# Patient Record
Sex: MALE | Race: WHITE | Employment: OTHER | URBAN - METROPOLITAN AREA
[De-identification: names, ages, dates, MRNs, and addresses within clinical notes are randomized per-mention and may not be internally consistent; named-entity substitution may affect disease eponyms.]

---

## 2017-03-28 ENCOUNTER — OFFICE VISIT (OUTPATIENT)
Dept: INTERNAL MEDICINE | Age: 67
End: 2017-03-28

## 2017-03-28 VITALS
DIASTOLIC BLOOD PRESSURE: 80 MMHG | SYSTOLIC BLOOD PRESSURE: 140 MMHG | HEART RATE: 66 BPM | WEIGHT: 218 LBS | BODY MASS INDEX: 30.52 KG/M2 | HEIGHT: 71 IN

## 2017-03-28 DIAGNOSIS — M19.90 ARTHRITIS: ICD-10-CM

## 2017-03-28 DIAGNOSIS — L50.1 IDIOPATHIC URTICARIA: ICD-10-CM

## 2017-03-28 DIAGNOSIS — Z00.00 MEDICARE ANNUAL WELLNESS VISIT, SUBSEQUENT: Primary | ICD-10-CM

## 2017-03-28 DIAGNOSIS — E78.49 OTHER HYPERLIPIDEMIA: ICD-10-CM

## 2017-03-28 DIAGNOSIS — R79.89 LOW TESTOSTERONE: ICD-10-CM

## 2017-03-28 DIAGNOSIS — R73.9 HYPERGLYCEMIA: ICD-10-CM

## 2017-03-28 DIAGNOSIS — I15.9 SECONDARY HYPERTENSION, UNSPECIFIED: ICD-10-CM

## 2017-03-28 PROCEDURE — G0439 PPPS, SUBSEQ VISIT: HCPCS | Performed by: INTERNAL MEDICINE

## 2017-03-28 RX ORDER — VERAPAMIL HYDROCHLORIDE 240 MG/1
240 TABLET, FILM COATED, EXTENDED RELEASE ORAL DAILY
Qty: 90 TABLET | Refills: 3 | Status: ON HOLD | OUTPATIENT
Start: 2017-03-28 | End: 2018-06-12 | Stop reason: CLARIF

## 2017-03-28 RX ORDER — MONTELUKAST SODIUM 10 MG/1
10 TABLET ORAL NIGHTLY
Qty: 90 TABLET | Refills: 3 | Status: SHIPPED | OUTPATIENT
Start: 2017-03-28 | End: 2018-01-23 | Stop reason: SDUPTHER

## 2017-03-28 ASSESSMENT — ENCOUNTER SYMPTOMS
CHEST TIGHTNESS: 0
ABDOMINAL PAIN: 0
VOMITING: 0
SHORTNESS OF BREATH: 0
WHEEZING: 0
NAUSEA: 0

## 2017-04-19 DIAGNOSIS — Z00.00 MEDICARE ANNUAL WELLNESS VISIT, SUBSEQUENT: ICD-10-CM

## 2017-04-19 DIAGNOSIS — I15.9 SECONDARY HYPERTENSION, UNSPECIFIED: ICD-10-CM

## 2017-04-19 LAB
A/G RATIO: 1.8 (ref 1.1–2.2)
ALBUMIN SERPL-MCNC: 4.8 G/DL (ref 3.4–5)
ALP BLD-CCNC: 92 U/L (ref 40–129)
ALT SERPL-CCNC: 30 U/L (ref 10–40)
ANION GAP SERPL CALCULATED.3IONS-SCNC: 16 MMOL/L (ref 3–16)
AST SERPL-CCNC: 18 U/L (ref 15–37)
BASOPHILS ABSOLUTE: 0.1 K/UL (ref 0–0.2)
BASOPHILS RELATIVE PERCENT: 1 %
BILIRUB SERPL-MCNC: 0.7 MG/DL (ref 0–1)
BUN BLDV-MCNC: 18 MG/DL (ref 7–20)
CALCIUM SERPL-MCNC: 9.4 MG/DL (ref 8.3–10.6)
CHLORIDE BLD-SCNC: 103 MMOL/L (ref 99–110)
CHOLESTEROL, TOTAL: 222 MG/DL (ref 0–199)
CO2: 24 MMOL/L (ref 21–32)
CREAT SERPL-MCNC: 0.8 MG/DL (ref 0.8–1.3)
EOSINOPHILS ABSOLUTE: 0.1 K/UL (ref 0–0.6)
EOSINOPHILS RELATIVE PERCENT: 0.9 %
GFR AFRICAN AMERICAN: >60
GFR NON-AFRICAN AMERICAN: >60
GLOBULIN: 2.7 G/DL
GLUCOSE BLD-MCNC: 122 MG/DL (ref 70–99)
HCT VFR BLD CALC: 47.1 % (ref 40.5–52.5)
HDLC SERPL-MCNC: 44 MG/DL (ref 40–60)
HEMOGLOBIN: 15.8 G/DL (ref 13.5–17.5)
LDL CHOLESTEROL CALCULATED: 146 MG/DL
LYMPHOCYTES ABSOLUTE: 2.5 K/UL (ref 1–5.1)
LYMPHOCYTES RELATIVE PERCENT: 26.4 %
MCH RBC QN AUTO: 30 PG (ref 26–34)
MCHC RBC AUTO-ENTMCNC: 33.5 G/DL (ref 31–36)
MCV RBC AUTO: 89.6 FL (ref 80–100)
MONOCYTES ABSOLUTE: 0.6 K/UL (ref 0–1.3)
MONOCYTES RELATIVE PERCENT: 6.5 %
NEUTROPHILS ABSOLUTE: 6.2 K/UL (ref 1.7–7.7)
NEUTROPHILS RELATIVE PERCENT: 65.2 %
PDW BLD-RTO: 13.4 % (ref 12.4–15.4)
PLATELET # BLD: 209 K/UL (ref 135–450)
PMV BLD AUTO: 10.1 FL (ref 5–10.5)
POTASSIUM SERPL-SCNC: 4.7 MMOL/L (ref 3.5–5.1)
PROSTATE SPECIFIC ANTIGEN: 2.24 NG/ML (ref 0–4)
RBC # BLD: 5.26 M/UL (ref 4.2–5.9)
SODIUM BLD-SCNC: 143 MMOL/L (ref 136–145)
TOTAL PROTEIN: 7.5 G/DL (ref 6.4–8.2)
TRIGL SERPL-MCNC: 159 MG/DL (ref 0–150)
VLDLC SERPL CALC-MCNC: 32 MG/DL
WBC # BLD: 9.5 K/UL (ref 4–11)

## 2017-04-20 RX ORDER — ATORVASTATIN CALCIUM 20 MG/1
20 TABLET, FILM COATED ORAL DAILY
Qty: 30 TABLET | Refills: 3 | Status: SHIPPED | OUTPATIENT
Start: 2017-04-20 | End: 2018-05-29

## 2017-05-04 ENCOUNTER — TELEPHONE (OUTPATIENT)
Dept: INTERNAL MEDICINE | Age: 67
End: 2017-05-04

## 2017-06-26 ENCOUNTER — OFFICE VISIT (OUTPATIENT)
Dept: INTERNAL MEDICINE | Age: 67
End: 2017-06-26

## 2017-06-26 VITALS
WEIGHT: 224.4 LBS | SYSTOLIC BLOOD PRESSURE: 138 MMHG | DIASTOLIC BLOOD PRESSURE: 70 MMHG | HEIGHT: 71 IN | BODY MASS INDEX: 31.42 KG/M2

## 2017-06-26 DIAGNOSIS — E78.49 OTHER HYPERLIPIDEMIA: ICD-10-CM

## 2017-06-26 DIAGNOSIS — M19.90 ARTHRITIS: ICD-10-CM

## 2017-06-26 DIAGNOSIS — Z01.818 PREOP EXAM FOR INTERNAL MEDICINE: Primary | ICD-10-CM

## 2017-06-26 PROCEDURE — G8419 CALC BMI OUT NRM PARAM NOF/U: HCPCS | Performed by: INTERNAL MEDICINE

## 2017-06-26 PROCEDURE — 99214 OFFICE O/P EST MOD 30 MIN: CPT | Performed by: INTERNAL MEDICINE

## 2017-06-26 PROCEDURE — 1123F ACP DISCUSS/DSCN MKR DOCD: CPT | Performed by: INTERNAL MEDICINE

## 2017-06-26 PROCEDURE — G8427 DOCREV CUR MEDS BY ELIG CLIN: HCPCS | Performed by: INTERNAL MEDICINE

## 2017-06-26 PROCEDURE — 1036F TOBACCO NON-USER: CPT | Performed by: INTERNAL MEDICINE

## 2017-06-26 PROCEDURE — 4040F PNEUMOC VAC/ADMIN/RCVD: CPT | Performed by: INTERNAL MEDICINE

## 2017-06-26 PROCEDURE — 3017F COLORECTAL CA SCREEN DOC REV: CPT | Performed by: INTERNAL MEDICINE

## 2017-11-06 ENCOUNTER — OFFICE VISIT (OUTPATIENT)
Dept: INTERNAL MEDICINE | Age: 67
End: 2017-11-06

## 2017-11-06 VITALS
HEIGHT: 70 IN | BODY MASS INDEX: 31.07 KG/M2 | WEIGHT: 217 LBS | SYSTOLIC BLOOD PRESSURE: 160 MMHG | DIASTOLIC BLOOD PRESSURE: 90 MMHG

## 2017-11-06 DIAGNOSIS — L50.1 IDIOPATHIC URTICARIA: ICD-10-CM

## 2017-11-06 DIAGNOSIS — E78.49 OTHER HYPERLIPIDEMIA: ICD-10-CM

## 2017-11-06 DIAGNOSIS — E66.9 OBESITY (BMI 30-39.9): ICD-10-CM

## 2017-11-06 DIAGNOSIS — I25.10 CAD IN NATIVE ARTERY: Primary | ICD-10-CM

## 2017-11-06 PROCEDURE — G8417 CALC BMI ABV UP PARAM F/U: HCPCS | Performed by: INTERNAL MEDICINE

## 2017-11-06 PROCEDURE — G8428 CUR MEDS NOT DOCUMENT: HCPCS | Performed by: INTERNAL MEDICINE

## 2017-11-06 PROCEDURE — 1036F TOBACCO NON-USER: CPT | Performed by: INTERNAL MEDICINE

## 2017-11-06 PROCEDURE — 4040F PNEUMOC VAC/ADMIN/RCVD: CPT | Performed by: INTERNAL MEDICINE

## 2017-11-06 PROCEDURE — 3017F COLORECTAL CA SCREEN DOC REV: CPT | Performed by: INTERNAL MEDICINE

## 2017-11-06 PROCEDURE — 1123F ACP DISCUSS/DSCN MKR DOCD: CPT | Performed by: INTERNAL MEDICINE

## 2017-11-06 PROCEDURE — 99214 OFFICE O/P EST MOD 30 MIN: CPT | Performed by: INTERNAL MEDICINE

## 2017-11-06 PROCEDURE — G8598 ASA/ANTIPLAT THER USED: HCPCS | Performed by: INTERNAL MEDICINE

## 2017-11-06 PROCEDURE — G8484 FLU IMMUNIZE NO ADMIN: HCPCS | Performed by: INTERNAL MEDICINE

## 2017-11-06 ASSESSMENT — PATIENT HEALTH QUESTIONNAIRE - PHQ9
SUM OF ALL RESPONSES TO PHQ9 QUESTIONS 1 & 2: 0
SUM OF ALL RESPONSES TO PHQ QUESTIONS 1-9: 0
2. FEELING DOWN, DEPRESSED OR HOPELESS: 0
1. LITTLE INTEREST OR PLEASURE IN DOING THINGS: 0

## 2017-12-18 ENCOUNTER — HOSPITAL ENCOUNTER (OUTPATIENT)
Dept: OTHER | Age: 67
Discharge: HOME | End: 2017-12-31
Attending: INTERNAL MEDICINE | Admitting: INTERNAL MEDICINE

## 2018-01-01 ENCOUNTER — HOSPITAL ENCOUNTER (OUTPATIENT)
Dept: OTHER | Age: 68
Discharge: HOME | End: 2018-01-31
Attending: INTERNAL MEDICINE | Admitting: INTERNAL MEDICINE

## 2018-01-23 DIAGNOSIS — Z00.00 MEDICARE ANNUAL WELLNESS VISIT, SUBSEQUENT: ICD-10-CM

## 2018-01-24 RX ORDER — MONTELUKAST SODIUM 10 MG/1
TABLET ORAL
Qty: 90 TABLET | Refills: 3 | Status: SHIPPED | OUTPATIENT
Start: 2018-01-24 | End: 2019-09-13 | Stop reason: SDUPTHER

## 2018-02-01 ENCOUNTER — HOSPITAL ENCOUNTER (OUTPATIENT)
Dept: OTHER | Age: 68
Discharge: HOME | End: 2018-02-28
Attending: INTERNAL MEDICINE | Admitting: INTERNAL MEDICINE

## 2018-03-01 ENCOUNTER — HOSPITAL ENCOUNTER (OUTPATIENT)
Dept: OTHER | Age: 68
Discharge: HOME | End: 2018-03-31
Attending: INTERNAL MEDICINE | Admitting: INTERNAL MEDICINE

## 2018-03-29 ENCOUNTER — OFFICE VISIT (OUTPATIENT)
Dept: ORTHOPEDIC SURGERY | Age: 68
End: 2018-03-29

## 2018-03-29 DIAGNOSIS — M16.12 PRIMARY OSTEOARTHRITIS OF LEFT HIP: Primary | ICD-10-CM

## 2018-03-29 DIAGNOSIS — M17.12 PRIMARY OSTEOARTHRITIS OF LEFT KNEE: ICD-10-CM

## 2018-03-29 DIAGNOSIS — R52 PAIN: ICD-10-CM

## 2018-03-29 PROCEDURE — 1036F TOBACCO NON-USER: CPT | Performed by: ORTHOPAEDIC SURGERY

## 2018-03-29 PROCEDURE — 1123F ACP DISCUSS/DSCN MKR DOCD: CPT | Performed by: ORTHOPAEDIC SURGERY

## 2018-03-29 PROCEDURE — G8417 CALC BMI ABV UP PARAM F/U: HCPCS | Performed by: ORTHOPAEDIC SURGERY

## 2018-03-29 PROCEDURE — G8427 DOCREV CUR MEDS BY ELIG CLIN: HCPCS | Performed by: ORTHOPAEDIC SURGERY

## 2018-03-29 PROCEDURE — 4040F PNEUMOC VAC/ADMIN/RCVD: CPT | Performed by: ORTHOPAEDIC SURGERY

## 2018-03-29 PROCEDURE — 99203 OFFICE O/P NEW LOW 30 MIN: CPT | Performed by: ORTHOPAEDIC SURGERY

## 2018-03-29 PROCEDURE — 3017F COLORECTAL CA SCREEN DOC REV: CPT | Performed by: ORTHOPAEDIC SURGERY

## 2018-03-29 PROCEDURE — G8484 FLU IMMUNIZE NO ADMIN: HCPCS | Performed by: ORTHOPAEDIC SURGERY

## 2018-03-29 RX ORDER — CLOPIDOGREL BISULFATE 75 MG/1
75 TABLET ORAL DAILY
Status: ON HOLD | COMMUNITY
End: 2018-07-27 | Stop reason: HOSPADM

## 2018-03-29 NOTE — PROGRESS NOTES
Chief Complaint    Knee Pain (LEFT knee pain; hx of scope 12 years ago; known ACL deficient knee) and Hip Pain (LEFT hip pain increased with activities in cardiac rehab)      History of Present Illness:  Mitchell Clay is a 69 y.o. male presents with a long-standing history of left hip and knee pain. He does have a history of an MI that was treated with 2 stents in October 2017. After that his cardiologist recommended he stop taking anti-inflammatories and he noticed increased pain in his left hip. He underwent an intra-articular steroid injection which offered approximately 2 months of relief. He is here to discuss possible treatment options including a hip replacement. He states the pain radiates down his left leg to his knee. He does not report any back pain. He does have a history of the prior knee injury and has a known ACL deficient knee. When he knows he is going to have increased activities he occasionally wears a knee brace over he states the knee brace actually causes increased pain. He has been using a cane for approximately 3 months. Pain Assessment  Location of Pain: Pelvis (hip)  Location Modifiers: Left  Severity of Pain: 7  Quality of Pain: Throbbing, Aching, Dull  Duration of Pain: Persistent  Frequency of Pain: Intermittent  Aggravating Factors: Walking, Standing, Squatting, Stairs, Exercise  Limiting Behavior: Some  Relieving Factors: Rest, Nsaids    Medical History:  Patient's medications, allergies, past medical, surgical, social and family histories were reviewed and updated as appropriate. Review of Systems:  Pertinent items are noted in HPI  Review of systems reviewed from Patient History Form dated on 3/29 and available in the patient's chart under the Media tab. Vital Signs: There were no vitals taken for this visit.     General Exam:   Constitutional: Patient is adequately groomed with no evidence of malnutrition  Vascular: Examination reveals no swelling or calf

## 2018-04-01 ENCOUNTER — HOSPITAL ENCOUNTER (OUTPATIENT)
Dept: OTHER | Age: 68
Discharge: HOME | End: 2018-04-30
Attending: INTERNAL MEDICINE | Admitting: INTERNAL MEDICINE

## 2018-04-18 DIAGNOSIS — M25.552 PAIN OF LEFT HIP JOINT: Primary | ICD-10-CM

## 2018-04-24 ENCOUNTER — HOSPITAL ENCOUNTER (OUTPATIENT)
Dept: PHYSICAL THERAPY | Age: 68
Discharge: HOME | End: 2018-04-30
Admitting: ORTHOPAEDIC SURGERY

## 2018-05-01 ENCOUNTER — HOSPITAL ENCOUNTER (OUTPATIENT)
Dept: PHYSICAL THERAPY | Age: 68
Discharge: HOME | End: 2018-05-31
Attending: ORTHOPAEDIC SURGERY | Admitting: ORTHOPAEDIC SURGERY

## 2018-05-01 ENCOUNTER — HOSPITAL ENCOUNTER (OUTPATIENT)
Dept: OTHER | Age: 68
Discharge: HOME | End: 2018-05-01
Attending: INTERNAL MEDICINE | Admitting: INTERNAL MEDICINE

## 2018-05-11 DIAGNOSIS — M25.552 PAIN OF LEFT HIP JOINT: Primary | ICD-10-CM

## 2018-05-14 ENCOUNTER — HOSPITAL ENCOUNTER (OUTPATIENT)
Dept: OTHER | Age: 68
Discharge: HOME | End: 2018-05-14
Attending: PHYSICIAN ASSISTANT | Admitting: PHYSICIAN ASSISTANT

## 2018-05-14 LAB
ALBUMIN SERPL-MCNC: 4.5 G/DL (ref 3.4–5)
ANION GAP SERPL CALCULATED.3IONS-SCNC: 17 MMOL/L (ref 3–16)
APTT: 33 SEC (ref 24.1–34.9)
BASOPHILS ABSOLUTE: 0.1 K/UL (ref 0–0.2)
BASOPHILS RELATIVE PERCENT: 1 %
BILIRUBIN URINE: NEGATIVE
BLOOD, URINE: NEGATIVE
BUN BLDV-MCNC: 17 MG/DL (ref 7–20)
CALCIUM SERPL-MCNC: 9.3 MG/DL (ref 8.3–10.6)
CHLORIDE BLD-SCNC: 103 MMOL/L (ref 99–110)
CLARITY: CLEAR
CO2: 20 MMOL/L (ref 21–32)
COLOR: YELLOW
CREAT SERPL-MCNC: 0.8 MG/DL (ref 0.8–1.3)
EOSINOPHILS ABSOLUTE: 0.3 K/UL (ref 0–0.6)
EOSINOPHILS RELATIVE PERCENT: 3.5 %
GFR AFRICAN AMERICAN: >60
GFR NON-AFRICAN AMERICAN: >60
GLUCOSE BLD-MCNC: 97 MG/DL (ref 70–99)
GLUCOSE URINE: NEGATIVE MG/DL
HCT VFR BLD CALC: 44.4 % (ref 40.5–52.5)
HEMOGLOBIN: 15.2 G/DL (ref 13.5–17.5)
INR BLD: 1.04 (ref 0.85–1.15)
KETONES, URINE: NEGATIVE MG/DL
LEUKOCYTE ESTERASE, URINE: NEGATIVE
LYMPHOCYTES ABSOLUTE: 3.2 K/UL (ref 1–5.1)
LYMPHOCYTES RELATIVE PERCENT: 32.6 %
MCH RBC QN AUTO: 28.8 PG (ref 26–34)
MCHC RBC AUTO-ENTMCNC: 34.1 G/DL (ref 31–36)
MCV RBC AUTO: 84.4 FL (ref 80–100)
MICROSCOPIC EXAMINATION: NORMAL
MONOCYTES ABSOLUTE: 0.9 K/UL (ref 0–1.3)
MONOCYTES RELATIVE PERCENT: 8.7 %
NEUTROPHILS ABSOLUTE: 5.3 K/UL (ref 1.7–7.7)
NEUTROPHILS RELATIVE PERCENT: 54.2 %
NITRITE, URINE: NEGATIVE
PDW BLD-RTO: 14.2 % (ref 12.4–15.4)
PH UA: 6
PLATELET # BLD: 243 K/UL (ref 135–450)
PMV BLD AUTO: 9.4 FL (ref 5–10.5)
POTASSIUM SERPL-SCNC: 4.1 MMOL/L (ref 3.5–5.1)
PROTEIN UA: NEGATIVE MG/DL
PROTHROMBIN TIME: 11.8 SEC (ref 9.6–13)
RBC # BLD: 5.26 M/UL (ref 4.2–5.9)
SODIUM BLD-SCNC: 140 MMOL/L (ref 136–145)
SPECIFIC GRAVITY UA: 1.02
TRANSFERRIN: 265 MG/DL (ref 200–360)
URINE TYPE: NORMAL
UROBILINOGEN, URINE: 0.2 E.U./DL
WBC # BLD: 9.8 K/UL (ref 4–11)

## 2018-05-14 ASSESSMENT — HOOS JR
RISING FROM SITTING: 2
LYING IN BED (TURNING OVER, MAINTAINING HIP POSITION): 3
BENDING TO THE FLOOR TO PICK UP OBJECT: 3
HOOS JR RAW SCORE: 14
WALKING ON UNEVEN SURFACE: 2
GOING UP OR DOWN STAIRS: 2
SITTING: 2

## 2018-05-14 ASSESSMENT — PROMIS GLOBAL HEALTH SCALE
HOW IS THE PROMIS V1.1 BEING ADMINISTERED?: 0
IN THE PAST 7 DAYS, HOW WOULD YOU RATE YOUR FATIGUE ON AVERAGE [ON A SCALE FROM 1 (NONE) TO 5 (VERY SEVERE)]?: 4
SUM OF RESPONSES TO QUESTIONS 2, 4, 5, & 10: 11
IN GENERAL, WOULD YOU SAY YOUR HEALTH IS...[ON A SCALE OF 1 (POOR) TO 5 (EXCELLENT)]: 3
SUM OF RESPONSES TO QUESTIONS 3, 6, 7, & 8: 15
IN GENERAL, HOW WOULD YOU RATE YOUR SATISFACTION WITH YOUR SOCIAL ACTIVITIES AND RELATIONSHIPS [ON A SCALE OF 1 (POOR) TO 5 (EXCELLENT)]?: 3
IN THE PAST 7 DAYS, HOW OFTEN HAVE YOU BEEN BOTHERED BY EMOTIONAL PROBLEMS, SUCH AS FEELING ANXIOUS, DEPRESSED, OR IRRITABLE [ON A SCALE FROM 1 (NEVER) TO 5 (ALWAYS)]?: 2
IN THE PAST 7 DAYS, HOW WOULD YOU RATE YOUR PAIN ON AVERAGE [ON A SCALE FROM 0 (NO PAIN) TO 10 (WORST IMAGINABLE PAIN)]?: 7
TO WHAT EXTENT ARE YOU ABLE TO CARRY OUT YOUR EVERYDAY PHYSICAL ACTIVITIES SUCH AS WALKING, CLIMBING STAIRS, CARRYING GROCERIES, OR MOVING A CHAIR [ON A SCALE OF 1 (NOT AT ALL) TO 5 (COMPLETELY)]?: 1
IN GENERAL, WOULD YOU SAY YOUR QUALITY OF LIFE IS...[ON A SCALE OF 1 (POOR) TO 5 (EXCELLENT)]: 2
IN GENERAL, HOW WOULD YOU RATE YOUR MENTAL HEALTH, INCLUDING YOUR MOOD AND YOUR ABILITY TO THINK [ON A SCALE OF 1 (POOR) TO 5 (EXCELLENT)]?: 4
IN GENERAL, HOW WOULD YOU RATE YOUR PHYSICAL HEALTH [ON A SCALE OF 1 (POOR) TO 5 (EXCELLENT)]?: 3
WHO IS THE PERSON COMPLETING THE PROMIS V1.1 SURVEY?: 0
IN GENERAL, PLEASE RATE HOW WELL YOU CARRY OUT YOUR USUAL SOCIAL ACTIVITIES (INCLUDES ACTIVITIES AT HOME, AT WORK, AND IN YOUR COMMUNITY, AND RESPONSIBILITIES AS A PARENT, CHILD, SPOUSE, EMPLOYEE, FRIEND, ETC) [ON A SCALE OF 1 (POOR) TO 5 (EXCELLENT)]?: 3

## 2018-05-15 LAB
ESTIMATED AVERAGE GLUCOSE: 131.2 MG/DL
HBA1C MFR BLD: 6.2 %
URINE CULTURE, ROUTINE: NORMAL

## 2018-05-17 ENCOUNTER — OFFICE VISIT (OUTPATIENT)
Dept: ORTHOPEDIC SURGERY | Age: 68
End: 2018-05-17

## 2018-05-17 DIAGNOSIS — M16.12 PRIMARY OSTEOARTHRITIS OF LEFT HIP: Primary | ICD-10-CM

## 2018-05-17 PROCEDURE — 3017F COLORECTAL CA SCREEN DOC REV: CPT | Performed by: ORTHOPAEDIC SURGERY

## 2018-05-17 PROCEDURE — G8417 CALC BMI ABV UP PARAM F/U: HCPCS | Performed by: ORTHOPAEDIC SURGERY

## 2018-05-17 PROCEDURE — G8428 CUR MEDS NOT DOCUMENT: HCPCS | Performed by: ORTHOPAEDIC SURGERY

## 2018-05-17 PROCEDURE — 1036F TOBACCO NON-USER: CPT | Performed by: ORTHOPAEDIC SURGERY

## 2018-05-17 PROCEDURE — 1123F ACP DISCUSS/DSCN MKR DOCD: CPT | Performed by: ORTHOPAEDIC SURGERY

## 2018-05-17 PROCEDURE — 4040F PNEUMOC VAC/ADMIN/RCVD: CPT | Performed by: ORTHOPAEDIC SURGERY

## 2018-05-17 PROCEDURE — 99212 OFFICE O/P EST SF 10 MIN: CPT | Performed by: ORTHOPAEDIC SURGERY

## 2018-05-29 ENCOUNTER — HOSPITAL ENCOUNTER (OUTPATIENT)
Dept: CT IMAGING | Age: 68
Discharge: HOME | End: 2018-05-29
Attending: ORTHOPAEDIC SURGERY | Admitting: ORTHOPAEDIC SURGERY

## 2018-05-29 ENCOUNTER — OFFICE VISIT (OUTPATIENT)
Dept: INTERNAL MEDICINE | Age: 68
End: 2018-05-29

## 2018-05-29 VITALS
HEIGHT: 70 IN | BODY MASS INDEX: 30.06 KG/M2 | SYSTOLIC BLOOD PRESSURE: 134 MMHG | WEIGHT: 210 LBS | DIASTOLIC BLOOD PRESSURE: 60 MMHG

## 2018-05-29 DIAGNOSIS — M16.12 PRIMARY OSTEOARTHRITIS OF LEFT HIP: ICD-10-CM

## 2018-05-29 DIAGNOSIS — M25.552 PAIN OF LEFT HIP JOINT: ICD-10-CM

## 2018-05-29 DIAGNOSIS — L50.1 IDIOPATHIC URTICARIA: ICD-10-CM

## 2018-05-29 DIAGNOSIS — E78.49 OTHER HYPERLIPIDEMIA: ICD-10-CM

## 2018-05-29 DIAGNOSIS — M25.552 PAIN IN LEFT HIP: ICD-10-CM

## 2018-05-29 DIAGNOSIS — Z01.818 PRE-OP EXAM: Primary | ICD-10-CM

## 2018-05-29 PROCEDURE — 93000 ELECTROCARDIOGRAM COMPLETE: CPT | Performed by: INTERNAL MEDICINE

## 2018-05-29 PROCEDURE — 4040F PNEUMOC VAC/ADMIN/RCVD: CPT | Performed by: INTERNAL MEDICINE

## 2018-05-29 PROCEDURE — G8417 CALC BMI ABV UP PARAM F/U: HCPCS | Performed by: INTERNAL MEDICINE

## 2018-05-29 PROCEDURE — 99214 OFFICE O/P EST MOD 30 MIN: CPT | Performed by: INTERNAL MEDICINE

## 2018-05-29 PROCEDURE — 1036F TOBACCO NON-USER: CPT | Performed by: INTERNAL MEDICINE

## 2018-05-29 PROCEDURE — 3017F COLORECTAL CA SCREEN DOC REV: CPT | Performed by: INTERNAL MEDICINE

## 2018-05-29 PROCEDURE — 1123F ACP DISCUSS/DSCN MKR DOCD: CPT | Performed by: INTERNAL MEDICINE

## 2018-05-29 PROCEDURE — G8427 DOCREV CUR MEDS BY ELIG CLIN: HCPCS | Performed by: INTERNAL MEDICINE

## 2018-06-07 ENCOUNTER — HOSPITAL ENCOUNTER (OUTPATIENT)
Dept: PREADMISSION TESTING | Age: 68
Discharge: HOME | End: 2018-06-08
Attending: ORTHOPAEDIC SURGERY | Admitting: ORTHOPAEDIC SURGERY

## 2018-06-12 ENCOUNTER — TELEPHONE (OUTPATIENT)
Dept: INTERNAL MEDICINE | Age: 68
End: 2018-06-12

## 2018-06-12 PROBLEM — M16.12 OSTEOARTHRITIS OF LEFT HIP: Status: ACTIVE | Noted: 2018-06-12

## 2018-06-14 ENCOUNTER — CARE COORDINATION (OUTPATIENT)
Dept: CASE MANAGEMENT | Age: 68
End: 2018-06-14

## 2018-06-14 DIAGNOSIS — M16.12 OSTEOARTHRITIS OF LEFT HIP, UNSPECIFIED OSTEOARTHRITIS TYPE: Primary | ICD-10-CM

## 2018-06-14 PROCEDURE — 1111F DSCHRG MED/CURRENT MED MERGE: CPT | Performed by: INTERNAL MEDICINE

## 2018-06-15 ENCOUNTER — CARE COORDINATION (OUTPATIENT)
Dept: CASE MANAGEMENT | Age: 68
End: 2018-06-15

## 2018-06-18 ENCOUNTER — CARE COORDINATION (OUTPATIENT)
Dept: CASE MANAGEMENT | Age: 68
End: 2018-06-18

## 2018-06-18 ENCOUNTER — TELEPHONE (OUTPATIENT)
Dept: ORTHOPEDIC SURGERY | Age: 68
End: 2018-06-18

## 2018-06-19 ENCOUNTER — CARE COORDINATION (OUTPATIENT)
Dept: CASE MANAGEMENT | Age: 68
End: 2018-06-19

## 2018-06-19 ENCOUNTER — NURSE ONLY (OUTPATIENT)
Dept: ORTHOPEDIC SURGERY | Age: 68
End: 2018-06-19

## 2018-06-19 VITALS — BODY MASS INDEX: 29.41 KG/M2 | WEIGHT: 210.1 LBS | HEIGHT: 71 IN

## 2018-06-19 DIAGNOSIS — M16.12 PRIMARY OSTEOARTHRITIS OF LEFT HIP: ICD-10-CM

## 2018-06-19 DIAGNOSIS — G89.29 CHRONIC PAIN OF LEFT KNEE: ICD-10-CM

## 2018-06-19 DIAGNOSIS — Z96.642 STATUS POST TOTAL REPLACEMENT OF LEFT HIP: ICD-10-CM

## 2018-06-19 DIAGNOSIS — M17.12 PRIMARY OSTEOARTHRITIS OF LEFT KNEE: Primary | ICD-10-CM

## 2018-06-19 DIAGNOSIS — M25.562 CHRONIC PAIN OF LEFT KNEE: ICD-10-CM

## 2018-06-19 PROCEDURE — 20611 DRAIN/INJ JOINT/BURSA W/US: CPT | Performed by: PHYSICIAN ASSISTANT

## 2018-06-19 PROCEDURE — 99212 OFFICE O/P EST SF 10 MIN: CPT | Performed by: PHYSICIAN ASSISTANT

## 2018-06-21 ENCOUNTER — CARE COORDINATION (OUTPATIENT)
Dept: CASE MANAGEMENT | Age: 68
End: 2018-06-21

## 2018-06-22 ENCOUNTER — CARE COORDINATION (OUTPATIENT)
Dept: CASE MANAGEMENT | Age: 68
End: 2018-06-22

## 2018-06-26 ENCOUNTER — OFFICE VISIT (OUTPATIENT)
Dept: ORTHOPEDIC SURGERY | Age: 68
End: 2018-06-26

## 2018-06-26 VITALS — WEIGHT: 210 LBS | BODY MASS INDEX: 30.06 KG/M2 | HEIGHT: 70 IN

## 2018-06-26 DIAGNOSIS — M16.12 PRIMARY OSTEOARTHRITIS OF LEFT HIP: Primary | ICD-10-CM

## 2018-06-26 PROCEDURE — 99024 POSTOP FOLLOW-UP VISIT: CPT | Performed by: PHYSICIAN ASSISTANT

## 2018-07-01 ENCOUNTER — HOSPITAL ENCOUNTER (OUTPATIENT)
Dept: PHYSICAL THERAPY | Age: 68
Discharge: HOME | End: 2018-07-01
Attending: ORTHOPAEDIC SURGERY | Admitting: ORTHOPAEDIC SURGERY

## 2018-07-09 ENCOUNTER — CARE COORDINATION (OUTPATIENT)
Dept: CASE MANAGEMENT | Age: 68
End: 2018-07-09

## 2018-07-19 ENCOUNTER — OFFICE VISIT (OUTPATIENT)
Dept: ORTHOPEDIC SURGERY | Age: 68
End: 2018-07-19

## 2018-07-19 VITALS — HEART RATE: 76 BPM | DIASTOLIC BLOOD PRESSURE: 87 MMHG | SYSTOLIC BLOOD PRESSURE: 137 MMHG

## 2018-07-19 DIAGNOSIS — M16.12 PRIMARY OSTEOARTHRITIS OF LEFT HIP: ICD-10-CM

## 2018-07-19 DIAGNOSIS — Z96.642 STATUS POST TOTAL HIP REPLACEMENT, LEFT: Primary | ICD-10-CM

## 2018-07-19 PROCEDURE — 99024 POSTOP FOLLOW-UP VISIT: CPT | Performed by: ORTHOPAEDIC SURGERY

## 2018-07-19 NOTE — PROGRESS NOTES
Patient is 5 weeks status post left anterior total hip replacement, Radhames plasty. Overall is doing extremely well with absolutely no pain, he is not taking any pain medications currently. Pain Assessment  Location of Pain: Pelvis (hip)  Location Modifiers: Left  Severity of Pain: 0]    On physical exam, patient's incision looks excellent, there is no signs of infection or DVT. Neurovascular exam is intact. Leg lengths are equal.      At this time, recommend outpatient physical therapy for total hip protocol. He can wean off the walker due to a cane and then start exercising. We've gone over the do's and don'ts of postoperative care.   Return to clinic 4 weeks for final check before his yearly exam.

## 2018-07-20 DIAGNOSIS — M16.12 PRIMARY OSTEOARTHRITIS OF LEFT HIP: Primary | ICD-10-CM

## 2018-07-23 ENCOUNTER — HOSPITAL ENCOUNTER (OUTPATIENT)
Dept: PHYSICAL THERAPY | Age: 68
Setting detail: THERAPIES SERIES
Discharge: HOME | End: 2018-07-23
Payer: MEDICARE

## 2018-07-23 PROCEDURE — 97112 NEUROMUSCULAR REEDUCATION: CPT

## 2018-07-23 PROCEDURE — 97110 THERAPEUTIC EXERCISES: CPT

## 2018-07-23 PROCEDURE — 97161 PT EVAL LOW COMPLEX 20 MIN: CPT

## 2018-07-23 PROCEDURE — G8979 MOBILITY GOAL STATUS: HCPCS

## 2018-07-23 PROCEDURE — G8978 MOBILITY CURRENT STATUS: HCPCS

## 2018-07-23 NOTE — PLAN OF CARE
(): At least 40 percent but less than 60 percent impaired, limited or restricted  Mobility: Walking and Moving Around Goal Status (): At least 1 percent but less than 20 percent impaired, limited or restricted    Pain Scale: 3/10 (in knee) no pain in hip  Easing factors: Steps,  Provocative factors: Steps, getting into and out of care     Type: []Constant   [x]Intermittent  []Radiating []Localized []other:     Numbness/Tingling:n/a     Occupation/School: retired    Living Status/Prior Level of Function: Independent with ADLs and IADLs    OBJECTIVE:     ROM LEFT RIGHT   HIP Flex     HIP Abd     HIP Ext     HIP IR     HIP ER     Knee ext     Knee Flex     Ankle PF     Ankle DF     Ankle In     Ankle Ev     Strength  LEFT RIGHT   HIP Flexors 3-/5 4+/5   HIP Abductors 3-/5 4+/5   HIP Ext 3-/5 4+/5   Hip ER 3-/5 4+/5   Knee EXT (quad) 4-/5 4+/5   Knee Flex (HS) 4-/5 4+/5   Ankle DF 4/5 4+/5   Ankle PF 4/5 4+/5   Ankle Inv 4/5 4+/5   Ankle EV 4/5 4+/5        Circumference             Reflexes/Sensation:    [x]Dermatomes/Myotomes intact    [x]Reflexes equal and normal bilaterally   []Other:    Joint mobility:   [x]Normal    []Hypo   []Hyper    Palpation: n/a    Functional Mobility/Transfers:     Posture: WNL    Bandages/Dressings/Incisions: Surgical Incision Anterior aspect of hip    Gait: (include devices/WB status) WBAT  Orthopedic Special Tests: n/a                       [x] Patient history, allergies, meds reviewed. Medical chart reviewed. See intake form. Review Of Systems (ROS):  [x]Performed Review of systems (Integumentary, CardioPulmonary, Neurological) by intake and observation. Intake form has been scanned into medical record. Patient has been instructed to contact their primary care physician regarding ROS issues if not already being addressed at this time.       Co-morbidities/Complexities (which will affect course of rehabilitation):  []None           Arthritic conditions   []Rheumatoid arthritis hypomobility   []Decreased LE functional ROM   [x]Decreased core/proximal hip strength and neuromuscular control   [x]Decreased LE functional strength   [x]Reduced balance/proprioceptive control   []other:      Functional Activity Limitations (from functional questionnaire and intake)   [x]Reduced ability to tolerate prolonged functional positions   [x]Reduced ability or difficulty with changes of positions or transfers between positions   [x]Reduced ability to maintain good posture and demonstrate good body mechanics with sitting, bending, and lifting   []Reduced ability to sleep   [x] Reduced ability or tolerance with driving and/or computer work   [x]Reduced ability to perform lifting, carrying tasks   [x]Reduced ability to squat   [x]Reduced ability to forward bend   [x]Reduced ability to ambulate prolonged functional periods/distances/surfaces   [x]Reduced ability to ascend/descend stairs   [x]Reduced ability to run, hop, cut or jump   []other:    Participation Restrictions   [x]Reduced participation in self care activities   [x]Reduced participation in home management activities   [x]Reduced participation in work activities   [x]Reduced participation in social activities. [x]Reduced participation in sport/recreation activities. Classification :    [x]Signs/symptoms consistent with post-surgical status including decreased ROM, strength and function.    []Signs/symptoms consistent with joint sprain/strain   []Signs/symptoms consistent with patella-femoral syndrome   []Signs/symptoms consistent with knee OA/hip OA   []Signs/symptoms consistent with internal derangement of knee/Hip   []Signs/symptoms consistent with functional hip weakness/NMR control      []Signs/symptoms consistent with tendinitis/tendinosis    []signs/symptoms consistent with pathology which may benefit from Dry needling      []other:      Prognosis/Rehab Potential:      []Excellent   [x]Good    []Fair   []Poor    Tolerance of

## 2018-07-23 NOTE — FLOWSHEET NOTE
[facility]    Physical Therapy Daily Treatment Note  Date:  2018    Patient Name:  Peter Kay    :  1950  MRN: [id number]  Medical/Treatment Diagnosis Information:  · Diagnosis:  Primary Left Hip OA s/p L SUSSY 18  · Treatment Diagnosis: I54.20  Insurance/Certification information:  PT Insurance Information: Medicare/[employer] Supplemental  Physician Information:  Referring Practitioner: Patricia Rodriguez of care signed (Y/N):     Date of Patient follow up with Physician:     G-Code (if applicable):      Date G-Code Applied:  2018  PT G-Codes  Functional Assessment Tool Used: LEFS  Score: 50%  Functional Limitation: Mobility: Walking and moving around  Mobility: Walking and Moving Around Current Status (): At least 40 percent but less than 60 percent impaired, limited or restricted  Mobility: Walking and Moving Around Goal Status ():  At least 1 percent but less than 20 percent impaired, limited or restricted    Progress Note: [x]  Yes  []  No      Latex Allergy:  [x]NO      []YES  Preferred Language for Healthcare:   [x]English       []other:    Visit # Insurance Allowable   1 Med nec     Pain level:  3/10  in knee    SUBJECTIVE:  See eval    OBJECTIVE: See eval  Observation:   Test measurements:    Patient educated on following:    RESTRICTIONS/PRECAUTIONS: Anterior Approach protocol    Exercises/Interventions:   Therapeutic Ex Wt/Sets/Reps/Hold Notes   Bike     Eliptical          Quad Set 20 x 5\"    Glut Set 20 x 5\"    PPT 15 x 5\"    Heel Slide 15x    HL Hip Abd/Add 2 x 10  Green Tband        Long Sit Hamstring Stretch 3 x 30\"    Long Sit Gastroc Stretch 3 x 30\"    LAQ 20 x 5\"    Standing HR 20 x    Standing Marches  20 x    Hip Flex/Abd 20 x                                        Manual     Gr I-II mobs for tissue reactivity     PROM-all planes     GR III-IV mobs for arthrokinematics     Lumbar/long axis distraction 2   [] Ionto  [x] NMR (84101) x  1   [] Vaso  [] Manual (28923) x       [] Mech Traction  [] ES (unattended):   [] Other:     GOALS:  Short Term Goals: To be achieved in: 2 weeks  1. Independent in HEP and progression per patient tolerance, in order to prevent re-injury. 2. Patient will have a decrease in pain to facilitate improvement in movement, function, and ADLs as indicated by Functional Deficits. Long Term Goals: To be achieved in: 4 weeks  1. Disability index score of 15% or less for the LEFS to assist with reaching prior level of function. 2. Patient will demonstrate increased AROM to equal the opposite side bilaterally to allow for proper joint functioning as indicated by patients Functional Deficits. 3. Patient will demonstrate an increase in strength of L LE = R LE to allow for proper functional mobility as indicated by patients Functional Deficits. 4. Patient will return to all transfers, work activities, and functional activities without increased symptoms or restriction. 5. Patient will have 0/10 pain with ADL's.  6. Patient stated goal: to return to PLOF    Goals that are underlined signify the goal has been accomplished. Progression Towards Functional goals:  [] Patient is progressing as expected towards functional goals listed. [] Progression is slowed due to complexities listed. [] Progression has been slowed due to co-morbidities.   [x] Plan just implemented, too soon to assess goals progression  [] Other:     ASSESSMENT:  See eval    Treatment/Activity Tolerance:  [x] Patient tolerated treatment well [] Patient limited by fatique  [] Patient limited by pain  [] Patient limited by other medical complications  [] Other:     Prognosis: [x] Good [] Fair  [] Poor    Patient Requires Follow-up: [x] Yes  [] No    PLAN: See eval  [] Continue per plan of care [] Alter current plan (see comments)  [x] Plan of care initiated [] Hold pending MD visit [] Discharge    Electronically signed by: Amado Masterson, PT

## 2018-07-24 ENCOUNTER — HOSPITAL ENCOUNTER (INPATIENT)
Age: 68
LOS: 3 days | Discharge: HOME | DRG: 379 | End: 2018-07-27
Attending: EMERGENCY MEDICINE | Admitting: HOSPITALIST
Payer: MEDICARE

## 2018-07-24 ENCOUNTER — TELEPHONE (OUTPATIENT)
Dept: INTERNAL MEDICINE | Age: 68
End: 2018-07-24

## 2018-07-24 DIAGNOSIS — K62.5 RECTAL BLEEDING: Primary | ICD-10-CM

## 2018-07-24 LAB
ABO/RH: NORMAL
ANION GAP SERPL CALCULATED.3IONS-SCNC: 13 MMOL/L (ref 3–16)
ANTIBODY SCREEN: NORMAL
BASOPHILS ABSOLUTE: 0.1 K/UL (ref 0–0.2)
BASOPHILS RELATIVE PERCENT: 1 %
BUN BLDV-MCNC: 11 MG/DL (ref 7–20)
CALCIUM SERPL-MCNC: 9.8 MG/DL (ref 8.3–10.6)
CHLORIDE BLD-SCNC: 103 MMOL/L (ref 99–110)
CO2: 25 MMOL/L (ref 21–32)
CREAT SERPL-MCNC: 0.9 MG/DL (ref 0.8–1.3)
EOSINOPHILS ABSOLUTE: 0.1 K/UL (ref 0–0.6)
EOSINOPHILS RELATIVE PERCENT: 1.4 %
GFR AFRICAN AMERICAN: >60
GFR NON-AFRICAN AMERICAN: >60
GLUCOSE BLD-MCNC: 125 MG/DL (ref 70–99)
HCT VFR BLD CALC: 39 % (ref 40.5–52.5)
HCT VFR BLD CALC: 40.1 % (ref 40.5–52.5)
HEMOGLOBIN: 13.1 G/DL (ref 13.5–17.5)
HEMOGLOBIN: 13.3 G/DL (ref 13.5–17.5)
INR BLD: 1.08 (ref 0.86–1.14)
LYMPHOCYTES ABSOLUTE: 2.3 K/UL (ref 1–5.1)
LYMPHOCYTES RELATIVE PERCENT: 28.3 %
MCH RBC QN AUTO: 28.6 PG (ref 26–34)
MCHC RBC AUTO-ENTMCNC: 33.1 G/DL (ref 31–36)
MCV RBC AUTO: 86.5 FL (ref 80–100)
MONOCYTES ABSOLUTE: 0.8 K/UL (ref 0–1.3)
MONOCYTES RELATIVE PERCENT: 10 %
NEUTROPHILS ABSOLUTE: 4.8 K/UL (ref 1.7–7.7)
NEUTROPHILS RELATIVE PERCENT: 59.3 %
PDW BLD-RTO: 13.7 % (ref 12.4–15.4)
PLATELET # BLD: 266 K/UL (ref 135–450)
PMV BLD AUTO: 8.8 FL (ref 5–10.5)
POC OCCULT BLOOD STOOL: POSITIVE
POTASSIUM REFLEX MAGNESIUM: 3.8 MMOL/L (ref 3.5–5.1)
PROTHROMBIN TIME: 12.3 SEC (ref 9.8–13)
RBC # BLD: 4.64 M/UL (ref 4.2–5.9)
SODIUM BLD-SCNC: 141 MMOL/L (ref 136–145)
WBC # BLD: 8.1 K/UL (ref 4–11)

## 2018-07-24 PROCEDURE — 82272 OCCULT BLD FECES 1-3 TESTS: CPT

## 2018-07-24 PROCEDURE — 6370000000 HC RX 637 (ALT 250 FOR IP): Performed by: INTERNAL MEDICINE

## 2018-07-24 PROCEDURE — 6360000002 HC RX W HCPCS: Performed by: STUDENT IN AN ORGANIZED HEALTH CARE EDUCATION/TRAINING PROGRAM

## 2018-07-24 PROCEDURE — 85610 PROTHROMBIN TIME: CPT

## 2018-07-24 PROCEDURE — 85025 COMPLETE CBC W/AUTO DIFF WBC: CPT

## 2018-07-24 PROCEDURE — 86850 RBC ANTIBODY SCREEN: CPT

## 2018-07-24 PROCEDURE — 85014 HEMATOCRIT: CPT

## 2018-07-24 PROCEDURE — 2580000003 HC RX 258: Performed by: STUDENT IN AN ORGANIZED HEALTH CARE EDUCATION/TRAINING PROGRAM

## 2018-07-24 PROCEDURE — 36415 COLL VENOUS BLD VENIPUNCTURE: CPT

## 2018-07-24 PROCEDURE — 86900 BLOOD TYPING SEROLOGIC ABO: CPT

## 2018-07-24 PROCEDURE — 85018 HEMOGLOBIN: CPT

## 2018-07-24 PROCEDURE — 80048 BASIC METABOLIC PNL TOTAL CA: CPT

## 2018-07-24 PROCEDURE — C9113 INJ PANTOPRAZOLE SODIUM, VIA: HCPCS | Performed by: STUDENT IN AN ORGANIZED HEALTH CARE EDUCATION/TRAINING PROGRAM

## 2018-07-24 PROCEDURE — 1200000000 HC SEMI PRIVATE

## 2018-07-24 PROCEDURE — 93005 ELECTROCARDIOGRAM TRACING: CPT | Performed by: EMERGENCY MEDICINE

## 2018-07-24 PROCEDURE — 6370000000 HC RX 637 (ALT 250 FOR IP): Performed by: STUDENT IN AN ORGANIZED HEALTH CARE EDUCATION/TRAINING PROGRAM

## 2018-07-24 PROCEDURE — 99285 EMERGENCY DEPT VISIT HI MDM: CPT

## 2018-07-24 PROCEDURE — 99222 1ST HOSP IP/OBS MODERATE 55: CPT | Performed by: HOSPITALIST

## 2018-07-24 PROCEDURE — 86901 BLOOD TYPING SEROLOGIC RH(D): CPT

## 2018-07-24 RX ORDER — ONDANSETRON 2 MG/ML
4 INJECTION INTRAMUSCULAR; INTRAVENOUS EVERY 6 HOURS PRN
Status: DISCONTINUED | OUTPATIENT
Start: 2018-07-24 | End: 2018-07-27 | Stop reason: HOSPADM

## 2018-07-24 RX ORDER — VERAPAMIL HYDROCHLORIDE 240 MG/1
240 TABLET, FILM COATED, EXTENDED RELEASE ORAL 2 TIMES DAILY
Status: DISCONTINUED | OUTPATIENT
Start: 2018-07-24 | End: 2018-07-27 | Stop reason: HOSPADM

## 2018-07-24 RX ORDER — CETIRIZINE HYDROCHLORIDE 10 MG/1
10 TABLET ORAL DAILY
Status: DISCONTINUED | OUTPATIENT
Start: 2018-07-24 | End: 2018-07-27 | Stop reason: HOSPADM

## 2018-07-24 RX ORDER — SODIUM CHLORIDE 0.9 % (FLUSH) 0.9 %
10 SYRINGE (ML) INJECTION EVERY 12 HOURS SCHEDULED
Status: DISCONTINUED | OUTPATIENT
Start: 2018-07-24 | End: 2018-07-27 | Stop reason: HOSPADM

## 2018-07-24 RX ORDER — NITROGLYCERIN 0.4 MG/1
0.4 TABLET SUBLINGUAL PRN
Status: DISCONTINUED | OUTPATIENT
Start: 2018-07-24 | End: 2018-07-27 | Stop reason: HOSPADM

## 2018-07-24 RX ORDER — MONTELUKAST SODIUM 10 MG/1
10 TABLET ORAL NIGHTLY
Status: DISCONTINUED | OUTPATIENT
Start: 2018-07-24 | End: 2018-07-27 | Stop reason: HOSPADM

## 2018-07-24 RX ORDER — SODIUM CHLORIDE 0.9 % (FLUSH) 0.9 %
10 SYRINGE (ML) INJECTION PRN
Status: DISCONTINUED | OUTPATIENT
Start: 2018-07-24 | End: 2018-07-27 | Stop reason: HOSPADM

## 2018-07-24 RX ORDER — PANTOPRAZOLE SODIUM 40 MG/10ML
80 INJECTION, POWDER, LYOPHILIZED, FOR SOLUTION INTRAVENOUS ONCE
Status: COMPLETED | OUTPATIENT
Start: 2018-07-24 | End: 2018-07-24

## 2018-07-24 RX ADMIN — Medication 10 ML: at 22:29

## 2018-07-24 RX ADMIN — MONTELUKAST SODIUM 10 MG: 10 TABLET, FILM COATED ORAL at 22:28

## 2018-07-24 RX ADMIN — POLYETHYLENE GLYCOL-3350 AND ELECTROLYTES 4000 ML: 236; 6.74; 5.86; 2.97; 22.74 POWDER, FOR SOLUTION ORAL at 18:51

## 2018-07-24 RX ADMIN — VERAPAMIL HYDROCHLORIDE 240 MG: 240 TABLET, FILM COATED, EXTENDED RELEASE ORAL at 22:28

## 2018-07-24 RX ADMIN — PANTOPRAZOLE SODIUM 80 MG: 40 INJECTION, POWDER, FOR SOLUTION INTRAVENOUS at 14:31

## 2018-07-24 RX ADMIN — SODIUM CHLORIDE 8 MG/HR: 9 INJECTION, SOLUTION INTRAVENOUS at 14:55

## 2018-07-24 RX ADMIN — CETIRIZINE HYDROCHLORIDE 10 MG: 10 TABLET, FILM COATED ORAL at 17:46

## 2018-07-24 ASSESSMENT — ENCOUNTER SYMPTOMS
EYE REDNESS: 0
ABDOMINAL PAIN: 0
CHEST TIGHTNESS: 0
HEMOPTYSIS: 0
DIARRHEA: 0
CONSTIPATION: 0
HEARTBURN: 0
SHORTNESS OF BREATH: 0
BLOOD IN STOOL: 1
WHEEZING: 0
EYE DISCHARGE: 0
RHINORRHEA: 0
VOMITING: 0
COUGH: 0
SINUS PRESSURE: 0
NAUSEA: 0
SORE THROAT: 0

## 2018-07-24 NOTE — ED PROVIDER NOTES
ED Attending Attestation Note     Date of evaluation: 7/24/2018    This patient was seen by the resident. I have seen and examined the patient, agree with the workup, evaluation, management and diagnosis. The care plan has been discussed. My assessment reveals Nontender abdomen.      Earla Pallas, MD  07/24/18 2538

## 2018-07-24 NOTE — LETTER
[facility] - [facility] of [address] [place] [address] [phone]  f [phone]    Nicholas Michaud MD                        SURGERY ORDER   -- Time of order -- 18    12:40 PM    Facility:   [facility] # _________________                               Scheduled by: ____________    Surgery Date & Time:     2018 / late room           Pt arrival:  Will be admitted already                                                                                       Patient Name:  Antonia Tabares     :  1950 PCP:  Carla Pham MD       Home Ph:    [phone] (home) [phone] (work)                                                    PROCEDURE:  Transanal excision of rectal polyp, possible transanal minimally invasive surgery (TAMIS) 00730    DIAGNOSIS:      ICD-10-CM ICD-9-CM    1. Rectal bleeding K62.5 569.3        Anesthesia: _General  Time Needed:  2 hours   Pt Position:  prone/jack-knife         Outpatient ___  Admit __x_                Pre-Op to be done by: _na__  Cardiac Clearance Done by: __na_  Labs Needed: CBC ___ CMP ___ EKG ___  INR ____PT/PTT___ Urine Hcg __  Marking for ostomy/ileostomy____na___  Bilateral Ureteral Stents___na__    Patient to meet with Anesthesiology prior to surgery ___no__     Medications to be stopped 5 days before surgery: _aspirin/plavix (already stopped)__  Additional / Special Orders:     Ciprofloxacin 400mg IV once on call to OR  Flagyl 500mg IV once on call to OR  To be scoped by Dr. Calista Alonso on 2018 and will be prepped at that time. Then will be admitted overnight for surgery Tuesday                                                                                                                 Segundo Hernandez 53   Fax   [phone]            Date Of Procedure:2018@ in late room.

## 2018-07-24 NOTE — H&P
Internal Medicine PGY-1 Resident History & Physical      PCP: Cecilia Almanza MD    Date of Admission: 7/24/2018    Date of Service: Pt seen/examined on 7/24 in ED and Admitted to Inpatient with expected LOS greater than two midnights due to medical therapy. Chief Complaint:  BRBPR and melena    History Of Present Illness:  Mr. Connie Farias is a 76 yo gentleman with CAD (s/p 2 ITZEL in RCA), L hip replacement, and non-hereditary angioedema who presents with 1 day of BRBPR and melena. He had loose BM this am with both melena and BRBPR. Admits to being lightheaded 2x last week, but it passed without him giving it much thought. Denies syncopizing. Denies N/V and abdominal pain. He is on aspirin and plavix for CAD s/p stents in 11/2017. He denies chest pain and shortness of breath. He had a L hip replacement in June, but denies taking NSAIDs for pain management. In the ED he was hemodynamically stable, Hgb 13.3, INR 1.08, rectal exam showed melena and hematochezia in the rectal vault. Pt was non-tachycardic in the ED. Past Medical History:          Diagnosis Date    Angioedema     ED (erectile dysfunction) 12/2/2010    Idiopathic urticaria     MI (myocardial infarction)     Osteoarthritis     Tear of medial cartilage or meniscus of knee, current     Unspecified essential hypertension        Past Surgical History:          Procedure Laterality Date    CARDIAC SURGERY      cardiac stents x2    CORONARY ANGIOPLASTY WITH STENT PLACEMENT  2017    HIP SURGERY      JOINT REPLACEMENT      OTHER SURGICAL HISTORY  06/12/2018    LEFT ANTERIOR HIP ARTHROPLASTY MAKOPLASTY       Medications Prior to Admission:      Prior to Admission medications    Medication Sig Start Date End Date Taking? Authorizing Provider   verapamil (VERELAN) 240 MG extended release capsule Take 240 mg by mouth 2 times daily   Yes Historical Provider, MD   Cetirizine HCl 10 MG CAPS Take 1 capsule every day by oral route.    Yes Historical

## 2018-07-24 NOTE — Clinical Note
Can you add to my schedule Tuesday. He will come in Monday for scope with Dr. Calista Alonso then need surgery Tuesday.  I can come in early (36) if possible if not add on end of day

## 2018-07-24 NOTE — ED PROVIDER NOTES
Date of evaluation: 7/24/2018    Chief Complaint   Rectal Bleeding      Nursing Notes, Past Medical Hx, Past Surgical Hx, Social Hx, Allergies, and Family Hx were reviewed. History of Present Illness     Elijah Renee is a 77 y.o. male with a history of hypertension, CAD status post stenting 10/17 on Plavix and aspirin, left total hip replacement 6/2018 who presents today with a one-day history of rectal bleeding. Patient states he woke up this morning and when he went to have a bowel movement he noted a large volume of both bright red blood as well as dark sticky stool in the toilet. He has never had this happen to him before. He denies abdominal pain nausea or vomiting. He denies any lightheadedness, chest pain, palpitations. He states that the blood in his stool was a 8 out of 10 and episodic. He has not yet tried any treatment for his symptoms and nothing else seems to make them better or worse. He has never had a colonoscopy in the past.  He isn't on any other blood thinners besides aspirin and his Plavix. Review of Systems     Review of Systems   Constitutional: Negative for activity change, appetite change, chills and fever. HENT: Negative for congestion, postnasal drip, rhinorrhea, sinus pressure, sneezing and sore throat. Eyes: Negative for discharge, redness and visual disturbance. Respiratory: Negative for cough, chest tightness, shortness of breath and wheezing. Cardiovascular: Negative for chest pain, palpitations and leg swelling. Gastrointestinal: Positive for blood in stool. Negative for abdominal pain, constipation, diarrhea, nausea and vomiting. Genitourinary: Negative for dysuria, flank pain, frequency, hematuria and testicular pain. Musculoskeletal: Negative for gait problem and myalgias. Skin: Negative for rash and wound. Neurological: Negative for syncope, facial asymmetry, weakness and numbness.        Past Medical, Surgical, Family, and Social History

## 2018-07-25 ENCOUNTER — ANESTHESIA (OUTPATIENT)
Dept: ENDOSCOPY | Age: 68
DRG: 379 | End: 2018-07-25
Payer: MEDICARE

## 2018-07-25 ENCOUNTER — ANESTHESIA EVENT (OUTPATIENT)
Dept: ENDOSCOPY | Age: 68
DRG: 379 | End: 2018-07-25
Payer: MEDICARE

## 2018-07-25 VITALS — OXYGEN SATURATION: 93 % | DIASTOLIC BLOOD PRESSURE: 71 MMHG | SYSTOLIC BLOOD PRESSURE: 119 MMHG

## 2018-07-25 LAB
ANION GAP SERPL CALCULATED.3IONS-SCNC: 11 MMOL/L (ref 3–16)
BASOPHILS ABSOLUTE: 0.1 K/UL (ref 0–0.2)
BASOPHILS RELATIVE PERCENT: 0.9 %
BUN BLDV-MCNC: 14 MG/DL (ref 7–20)
CALCIUM SERPL-MCNC: 9.5 MG/DL (ref 8.3–10.6)
CHLORIDE BLD-SCNC: 106 MMOL/L (ref 99–110)
CO2: 26 MMOL/L (ref 21–32)
CREAT SERPL-MCNC: 0.8 MG/DL (ref 0.8–1.3)
EOSINOPHILS ABSOLUTE: 0.1 K/UL (ref 0–0.6)
EOSINOPHILS RELATIVE PERCENT: 1.2 %
GFR AFRICAN AMERICAN: >60
GFR NON-AFRICAN AMERICAN: >60
GLUCOSE BLD-MCNC: 107 MG/DL (ref 70–99)
HCT VFR BLD CALC: 37.2 % (ref 40.5–52.5)
HCT VFR BLD CALC: 38.1 % (ref 40.5–52.5)
HCT VFR BLD CALC: 38.2 % (ref 40.5–52.5)
HCT VFR BLD CALC: 38.6 % (ref 40.5–52.5)
HEMOGLOBIN: 12.3 G/DL (ref 13.5–17.5)
HEMOGLOBIN: 12.6 G/DL (ref 13.5–17.5)
HEMOGLOBIN: 12.6 G/DL (ref 13.5–17.5)
HEMOGLOBIN: 12.8 G/DL (ref 13.5–17.5)
LYMPHOCYTES ABSOLUTE: 2.4 K/UL (ref 1–5.1)
LYMPHOCYTES RELATIVE PERCENT: 28.2 %
MAGNESIUM: 2 MG/DL (ref 1.8–2.4)
MCH RBC QN AUTO: 28.5 PG (ref 26–34)
MCHC RBC AUTO-ENTMCNC: 33.2 G/DL (ref 31–36)
MCV RBC AUTO: 85.7 FL (ref 80–100)
MONOCYTES ABSOLUTE: 0.7 K/UL (ref 0–1.3)
MONOCYTES RELATIVE PERCENT: 8.1 %
NEUTROPHILS ABSOLUTE: 5.2 K/UL (ref 1.7–7.7)
NEUTROPHILS RELATIVE PERCENT: 61.6 %
PDW BLD-RTO: 13.5 % (ref 12.4–15.4)
PLATELET # BLD: 253 K/UL (ref 135–450)
PMV BLD AUTO: 9 FL (ref 5–10.5)
POTASSIUM REFLEX MAGNESIUM: 4 MMOL/L (ref 3.5–5.1)
RBC # BLD: 4.45 M/UL (ref 4.2–5.9)
SODIUM BLD-SCNC: 143 MMOL/L (ref 136–145)
WBC # BLD: 8.4 K/UL (ref 4–11)

## 2018-07-25 PROCEDURE — 3609017100 HC EGD: Performed by: INTERNAL MEDICINE

## 2018-07-25 PROCEDURE — 88305 TISSUE EXAM BY PATHOLOGIST: CPT

## 2018-07-25 PROCEDURE — 85014 HEMATOCRIT: CPT

## 2018-07-25 PROCEDURE — 99232 SBSQ HOSP IP/OBS MODERATE 35: CPT | Performed by: HOSPITALIST

## 2018-07-25 PROCEDURE — 85025 COMPLETE CBC W/AUTO DIFF WBC: CPT

## 2018-07-25 PROCEDURE — 6370000000 HC RX 637 (ALT 250 FOR IP): Performed by: STUDENT IN AN ORGANIZED HEALTH CARE EDUCATION/TRAINING PROGRAM

## 2018-07-25 PROCEDURE — 2500000003 HC RX 250 WO HCPCS: Performed by: NURSE ANESTHETIST, CERTIFIED REGISTERED

## 2018-07-25 PROCEDURE — 7100000011 HC PHASE II RECOVERY - ADDTL 15 MIN: Performed by: INTERNAL MEDICINE

## 2018-07-25 PROCEDURE — 6360000002 HC RX W HCPCS: Performed by: STUDENT IN AN ORGANIZED HEALTH CARE EDUCATION/TRAINING PROGRAM

## 2018-07-25 PROCEDURE — 3609010300 HC COLONOSCOPY W/BIOPSY SINGLE/MULTIPLE: Performed by: INTERNAL MEDICINE

## 2018-07-25 PROCEDURE — 85018 HEMOGLOBIN: CPT

## 2018-07-25 PROCEDURE — 6360000002 HC RX W HCPCS: Performed by: NURSE ANESTHETIST, CERTIFIED REGISTERED

## 2018-07-25 PROCEDURE — C9113 INJ PANTOPRAZOLE SODIUM, VIA: HCPCS | Performed by: STUDENT IN AN ORGANIZED HEALTH CARE EDUCATION/TRAINING PROGRAM

## 2018-07-25 PROCEDURE — 0DBP8ZX EXCISION OF RECTUM, VIA NATURAL OR ARTIFICIAL OPENING ENDOSCOPIC, DIAGNOSTIC: ICD-10-PCS | Performed by: INTERNAL MEDICINE

## 2018-07-25 PROCEDURE — 83735 ASSAY OF MAGNESIUM: CPT

## 2018-07-25 PROCEDURE — 36415 COLL VENOUS BLD VENIPUNCTURE: CPT

## 2018-07-25 PROCEDURE — 80048 BASIC METABOLIC PNL TOTAL CA: CPT

## 2018-07-25 PROCEDURE — 2580000003 HC RX 258: Performed by: STUDENT IN AN ORGANIZED HEALTH CARE EDUCATION/TRAINING PROGRAM

## 2018-07-25 PROCEDURE — 7100000010 HC PHASE II RECOVERY - FIRST 15 MIN: Performed by: INTERNAL MEDICINE

## 2018-07-25 PROCEDURE — 3700000001 HC ADD 15 MINUTES (ANESTHESIA): Performed by: INTERNAL MEDICINE

## 2018-07-25 PROCEDURE — 1200000000 HC SEMI PRIVATE

## 2018-07-25 PROCEDURE — 3700000000 HC ANESTHESIA ATTENDED CARE: Performed by: INTERNAL MEDICINE

## 2018-07-25 PROCEDURE — 2720000010 HC SURG SUPPLY STERILE: Performed by: INTERNAL MEDICINE

## 2018-07-25 PROCEDURE — 2580000003 HC RX 258: Performed by: INTERNAL MEDICINE

## 2018-07-25 PROCEDURE — 0DJ08ZZ INSPECTION OF UPPER INTESTINAL TRACT, VIA NATURAL OR ARTIFICIAL OPENING ENDOSCOPIC: ICD-10-PCS | Performed by: INTERNAL MEDICINE

## 2018-07-25 RX ORDER — ASPIRIN 81 MG/1
81 TABLET, CHEWABLE ORAL DAILY
Status: DISCONTINUED | OUTPATIENT
Start: 2018-07-25 | End: 2018-07-27 | Stop reason: HOSPADM

## 2018-07-25 RX ORDER — SODIUM CHLORIDE, SODIUM LACTATE, POTASSIUM CHLORIDE, CALCIUM CHLORIDE 600; 310; 30; 20 MG/100ML; MG/100ML; MG/100ML; MG/100ML
INJECTION, SOLUTION INTRAVENOUS CONTINUOUS
Status: DISCONTINUED | OUTPATIENT
Start: 2018-07-25 | End: 2018-07-27 | Stop reason: HOSPADM

## 2018-07-25 RX ORDER — PROPOFOL 10 MG/ML
INJECTION, EMULSION INTRAVENOUS PRN
Status: DISCONTINUED | OUTPATIENT
Start: 2018-07-25 | End: 2018-07-25 | Stop reason: SDUPTHER

## 2018-07-25 RX ORDER — MIDAZOLAM HYDROCHLORIDE 1 MG/ML
INJECTION INTRAMUSCULAR; INTRAVENOUS PRN
Status: DISCONTINUED | OUTPATIENT
Start: 2018-07-25 | End: 2018-07-25 | Stop reason: SDUPTHER

## 2018-07-25 RX ORDER — GLYCOPYRROLATE 0.2 MG/ML
INJECTION INTRAMUSCULAR; INTRAVENOUS PRN
Status: DISCONTINUED | OUTPATIENT
Start: 2018-07-25 | End: 2018-07-25 | Stop reason: SDUPTHER

## 2018-07-25 RX ADMIN — Medication 10 ML: at 09:31

## 2018-07-25 RX ADMIN — MIDAZOLAM HYDROCHLORIDE 1 MG: 1 INJECTION INTRAMUSCULAR; INTRAVENOUS at 15:29

## 2018-07-25 RX ADMIN — VERAPAMIL HYDROCHLORIDE 240 MG: 240 TABLET, FILM COATED, EXTENDED RELEASE ORAL at 09:29

## 2018-07-25 RX ADMIN — MIDAZOLAM HYDROCHLORIDE 1 MG: 1 INJECTION INTRAMUSCULAR; INTRAVENOUS at 15:37

## 2018-07-25 RX ADMIN — PROPOFOL 100 MG: 10 INJECTION, EMULSION INTRAVENOUS at 15:26

## 2018-07-25 RX ADMIN — PROPOFOL 50 MG: 10 INJECTION, EMULSION INTRAVENOUS at 15:28

## 2018-07-25 RX ADMIN — ASPIRIN 81 MG CHEWABLE TABLET 81 MG: 81 TABLET CHEWABLE at 17:33

## 2018-07-25 RX ADMIN — CETIRIZINE HYDROCHLORIDE 10 MG: 10 TABLET, FILM COATED ORAL at 17:33

## 2018-07-25 RX ADMIN — PROPOFOL 40 MG: 10 INJECTION, EMULSION INTRAVENOUS at 15:20

## 2018-07-25 RX ADMIN — SODIUM CHLORIDE 8 MG/HR: 9 INJECTION, SOLUTION INTRAVENOUS at 00:36

## 2018-07-25 RX ADMIN — SODIUM CHLORIDE, POTASSIUM CHLORIDE, SODIUM LACTATE AND CALCIUM CHLORIDE: 600; 310; 30; 20 INJECTION, SOLUTION INTRAVENOUS at 14:44

## 2018-07-25 RX ADMIN — MONTELUKAST SODIUM 10 MG: 10 TABLET, FILM COATED ORAL at 17:34

## 2018-07-25 RX ADMIN — GLYCOPYRROLATE 0.2 MG: 0.2 INJECTION INTRAMUSCULAR; INTRAVENOUS at 15:19

## 2018-07-25 RX ADMIN — SODIUM CHLORIDE 8 MG/HR: 9 INJECTION, SOLUTION INTRAVENOUS at 10:38

## 2018-07-25 RX ADMIN — MIDAZOLAM HYDROCHLORIDE 2 MG: 1 INJECTION INTRAMUSCULAR; INTRAVENOUS at 15:05

## 2018-07-25 ASSESSMENT — PAIN SCALES - GENERAL
PAINLEVEL_OUTOF10: 0

## 2018-07-25 ASSESSMENT — PULMONARY FUNCTION TESTS
PIF_VALUE: 0
PIF_VALUE: 1
PIF_VALUE: 0
PIF_VALUE: 1
PIF_VALUE: 0
PIF_VALUE: 1
PIF_VALUE: 0

## 2018-07-25 NOTE — PROGRESS NOTES
Awake and alert. Took most of [place] last night. Up to [facility] to pass watery dark red liquid stool. NPO for colonoscopy today. VSS. Denies feeling dizzy or lightheaded when OOB. Will continue to monitor.

## 2018-07-25 NOTE — CONSULTS
13.3*  13.1*  12.8*  12.6*   HCT  40.1*  39.0*  38.6*  38.1*   MCV  86.5   --    --   85.7   PLT  266   --    --   253     Recent Labs      07/24/18   1139  07/25/18   0705   NA  141  143   K  3.8  4.0   CL  103  106   CO2  25  26   BUN  11  14   CREATININE  0.9  0.8     No results for input(s): AST, ALT, ALB, BILIDIR, BILITOT, ALKPHOS in the last 72 hours. No results for input(s): LIPASE, AMYLASE in the last 72 hours. Recent Labs      07/24/18   1138   PROTIME  12.3   INR  1.08     No results for input(s): PTT in the last 72 hours. No results for input(s): OCCULTBLD in the last 72 hours. Imaging Studies:                            Ultrasound:                CT-scan of abdomen and pelvis: none                 Assessment:     Active Problems:    Rectal bleeding    Coronary artery disease due to lipid rich plaque  Resolved Problems:    * No resolved hospital problems. *      Upper and lower GI bleed       Recommendations:     Based on the clinical findings and examination the following recommendations are appropriate:    1. Colonoscopy today. 2. Holding home asprin/plavix. 3. Will follow up post colonoscopy for results. H and H stable. No acute bloody BMs. Thank you for the opportunity to participate in [facility]. Patient seen on rounds, for EGD/colonoscopy later today.     Will discuss with attending physician Dr. Angelina Gipson MD PGY2  [phone]  07/25/18

## 2018-07-25 NOTE — PROGRESS NOTES
appropriate, normal insight  Capillary Refill: Brisk,< 3 seconds   Peripheral Pulses: +2 palpable, equal bilaterally     Labs:   Recent Labs      07/24/18   1139  07/24/18   1818  07/25/18   0150  07/25/18   0705   WBC  8.1   --    --   8.4   HGB  13.3*  13.1*  12.8*  12.6*   HCT  40.1*  39.0*  38.6*  38.1*   PLT  266   --    --   253     Recent Labs      07/24/18   1139  07/25/18   0705   NA  141  143   K  3.8  4.0   CL  103  106   CO2  25  26   BUN  11  14   CREATININE  0.9  0.8   CALCIUM  9.8  9.5     No results for input(s): AST, ALT, BILIDIR, BILITOT, ALKPHOS in the last 72 hours. Recent Labs      07/24/18   1138   INR  1.08     No results for input(s): Ladena Quale in the last 72 hours. Urinalysis:    Lab Results   Component Value Date    NITRU Negative 05/14/2018    BLOODU Negative 05/14/2018    SPECGRAV 1.020 05/14/2018    GLUCOSEU Negative 05/14/2018       Radiology:  No orders to display           Assessment/Plan:    Veena Sidhu, 77 y.o. male w/ a PMHx of CAD (s/p 2 ITZEL in RCA), L hip replacement on 6/2018, and non-hereditary angioedema. Admitted for watery dark red liquid stools. Suspicion for GI bleed as etiology. Active Hospital Problems    Diagnosis Date Noted    Rectal bleeding [K62.5] 07/24/2018    Coronary artery disease due to lipid rich plaque [I25.10, I25.83]      Watery dark red liquid stools  -FOBT +  -Consult GI - pending colonoscopy this afternoon  -80 mg protonix bolus followed with protonix gtt  -Patient is currently NPO as he awaits his colonoscopy today  -Holding Plavix  -SCDs, no DVT prophylaxis due to active GI bleed. Restarted aspirin 81 mg PO qdaily (GI rec on 7/25 suggested to hold aspirin).   -INR 1.06 with PT of 12.3  -Hgb 12.6 (but trending downward from 13.3), hemodynamically stable  -Trend H&H q 8 hrs, transfuse if Hgb <8  -type and screen ready     HTN  -continue home verapamil     Non-hereditary Angioedema  -continue home zyrtec and singulair     CAD s/p 2

## 2018-07-25 NOTE — ANESTHESIA PRE PROCEDURE
Department of Anesthesiology  Preprocedure Note       Name:  Antonia Tabares   Age:  77 y.o.  :  1950                                          MRN:  [id number]         Date:  2018      Surgeon: Maylin Chaudhari):  Evelyn Flowers MD    Procedure: Procedure(s):  COLONOSCOPY  EGD ESOPHAGOGASTRODUODENOSCOPY    Medications prior to admission:   Prior to Admission medications    Medication Sig Start Date End Date Taking? Authorizing Provider   verapamil (VERELAN) 240 MG extended release capsule Take 240 mg by mouth 2 times daily   Yes Historical Provider, MD   Cetirizine HCl 10 MG CAPS Take 1 capsule every day by oral route.    Yes Historical Provider, MD   clopidogrel (PLAVIX) 75 MG tablet Take 75 mg by mouth daily   Yes Historical Provider, MD   montelukast (SINGULAIR) 10 MG tablet TAKE 1 TABLET EVERY NIGHT 18  Yes Carla Pham MD   aspirin 81 MG tablet Take 81 mg by mouth daily   Yes Historical Provider, MD   Misc Natural Products (OSTEO BI-FLEX/5-LOXIN ADVANCED PO) Take 1 tablet by mouth daily   Yes Historical Provider, MD   nitroGLYCERIN (NITROSTAT) 0.4 MG SL tablet Place 1 tablet under the tongue as needed 10/24/17   Historical Provider, MD       Current medications:    Current Facility-Administered Medications   Medication Dose Route Frequency Provider Last Rate Last Dose    aspirin chewable tablet 81 mg  81 mg Oral Daily Tiffanie Deleon MD        cetirizine (ZYRTEC) tablet 10 mg  10 mg Oral Daily Charline Gonzalez MD   Stopped at 18 0930    montelukast (SINGULAIR) tablet 10 mg  10 mg Oral Nightly Charline Gonzalez MD   10 mg at 18 2228    nitroGLYCERIN (NITROSTAT) SL tablet 0.4 mg  0.4 mg Sublingual PRN Charline Gonzalez MD        verapamil [place] [facility]) extended release tablet 240 mg  240 mg Oral BID Charline Gonzalez MD   240 mg at 18 0929    pantoprazole (PROTONIX) 80 mg in sodium chloride 0.9 % 100 mL infusion  8 mg/hr Intravenous Continuous Charline Gonzalez MD 10 mL/hr at 18 1038 8 mg/hr at 07/25/18 1038    sodium chloride flush 0.9 % injection 10 mL  10 mL Intravenous 2 times per day Jim Ogden MD   10 mL at 07/25/18 0931    sodium chloride flush 0.9 % injection 10 mL  10 mL Intravenous PRN Jim Ogden MD        ondansetron [facility]) injection 4 mg  4 mg Intravenous Q6H PRN Jim Ogden MD           Allergies: Allergies   Allergen Reactions    Azithromycin Anaphylaxis     Patient states that he is allergic to pretty much every antibiotic. He either breaks out in hives or develops anaphylaxis. Patient tolerates metronidazole (Flagyl).  Diovan [Valsartan]     Influenza Vaccines     Lisinopril     Macrolides And Ketolides     Penicillins      PT advised that he is unable to take any type of ABX, chronic hives.  Sulfa Antibiotics     Atorvastatin Hives and Rash     May 2017: broke out in hives around groin and developed yeast infection       Problem List:    Patient Active Problem List   Diagnosis Code    Idiopathic urticaria L50.1    Essential hypertension I10    ED (erectile dysfunction) N52.9    Hyperlipidemia E78.5    Low testosterone E34.9    Hyperglycemia R73.9    Obesity (BMI 30-39. 9) E66.9    Arthritis M19.90    Primary osteoarthritis of left hip M16.12    Osteoarthritis of left hip M16.12    Status post total hip replacement, left J18.469    Rectal bleeding K62.5    Coronary artery disease due to lipid rich plaque I25.10, I25.83       Past Medical History:        Diagnosis Date    Angioedema     ED (erectile dysfunction) 12/2/2010    Idiopathic urticaria     MI (myocardial infarction)     Osteoarthritis     Tear of medial cartilage or meniscus of knee, current     Unspecified essential hypertension        Past Surgical History:        Procedure Laterality Date    CARDIAC SURGERY      cardiac stents x2    CORONARY ANGIOPLASTY WITH STENT PLACEMENT  2017    HIP SURGERY      JOINT REPLACEMENT      OTHER SURGICAL HISTORY  06/12/2018 LEFT ANTERIOR HIP ARTHROPLASTY MAKOPLASTY       Social History:    Social History   Substance Use Topics    Smoking status: Former Smoker     Types: Cigars     Quit date: 2/6/2014    Smokeless tobacco: Never Used    Alcohol use No                                Counseling given: Not Answered      Vital Signs (Current):   Vitals:    07/24/18 2214 07/25/18 0208 07/25/18 0544 07/25/18 0853   BP: 115/83 124/68 136/70 132/72   Pulse: 99 75 68 74   Resp: 16 16 16 16   Temp: 97.8 °F (36.6 °C) 97.9 °F (36.6 °C) 98.7 °F (37.1 °C) 98.7 °F (37.1 °C)   TempSrc: Oral Oral Oral Oral   SpO2: 96% 95% 97% 97%   Weight:       Height:                                                  BP Readings from Last 3 Encounters:   07/25/18 132/72   07/19/18 137/87   06/13/18 117/61       NPO Status:                                                                                 BMI:   Wt Readings from Last 3 Encounters:   07/24/18 210 lb (95.3 kg)   06/26/18 210 lb (95.3 kg)   06/19/18 210 lb 1.6 oz (95.3 kg)     Body mass index is 29.29 kg/m². CBC:   Lab Results   Component Value Date    WBC 8.4 07/25/2018    RBC 4.45 07/25/2018    HGB 12.6 07/25/2018    HCT 38.1 07/25/2018    MCV 85.7 07/25/2018    RDW 13.5 07/25/2018     07/25/2018       CMP:   Lab Results   Component Value Date     07/25/2018    K 4.0 07/25/2018     07/25/2018    CO2 26 07/25/2018    BUN 14 07/25/2018    CREATININE 0.8 07/25/2018    GFRAA >60 07/25/2018    GFRAA >60 02/15/2013    AGRATIO 1.8 04/19/2017    LABGLOM >60 07/25/2018    GLUCOSE 107 07/25/2018    PROT 7.5 04/19/2017    PROT 7.3 02/15/2013    CALCIUM 9.5 07/25/2018    BILITOT 0.7 04/19/2017    ALKPHOS 92 04/19/2017    AST 18 04/19/2017    ALT 30 04/19/2017       POC Tests: No results for input(s): POCGLU, POCNA, POCK, POCCL, POCBUN, POCHEMO, POCHCT in the last 72 hours.     Coags:   Lab Results   Component Value Date    PROTIME 12.3 07/24/2018    INR 1.08 07/24/2018    APTT 33.0 05/14/2018 HCG (If Applicable): No results found for: PREGTESTUR, PREGSERUM, HCG, HCGQUANT     ABGs: No results found for: PHART, PO2ART, LTW6ZIU, AZQ6AJR, BEART, I9QCLSEI     Type & Screen (If Applicable):  No results found for: LABABO, LABRH    Anesthesia Evaluation    Airway: Mallampati: II  TM distance: >3 FB   Neck ROM: full  Mouth opening: > = 3 FB Dental:          Pulmonary: breath sounds clear to auscultation                             Cardiovascular:    (+) hypertension:, past MI:, CAD:, CABG/stent:,       ECG reviewed  Rhythm: regular  Rate: normal  Echocardiogram reviewed               ROS comment: 2 ITZEL RCA    ECHO:  Left ventricle: The cavity size was normal. Wall thickness was    normal. Systolic function was mildly to moderately reduced. The    estimated ejection fraction was in the range of 40% to 45%.    Akinesis of the entireinferolateral and inferior myocardium.    Abnormal relaxation with normal filling pressures. - Aortic valve: Mild regurgitation.  - Mitral valve: Mildly calcified annulus. Mildly thickened leaflets,    .  - Right ventricle: Systolic function was normal by objective    interpretation.  - Pulmonic valve: Peak gradient (S): 7mm Hg. Neuro/Psych:               GI/Hepatic/Renal:   (+) bowel prep, morbid obesity         ROS comment: Blood per rectum and melena with lightheadedness. Endo/Other:                      ROS comment: Nonhereditary angioedema Abdominal:           Vascular:                                        Anesthesia Plan      MAC     ASA 3       Induction: intravenous. Anesthetic plan and risks discussed with patient.                       Cindy Cdi MD   7/25/2018

## 2018-07-25 NOTE — PROGRESS NOTES
Internal Medicine PGY-1 Resident Progress Note        PCP: Carla Pham MD    Date of Admission: 7/24/2018    Chief Complaint: BRBPR and melena     Hospital Course:  Mr. Marcio Sidhu is a 78 yo gentleman with CAD (s/p 2 ITZEL in RCA), L hip replacement on 6/2018, and non-hereditary angioedema who presents with 1 day of BRBPR and melena. He had loose BM this am with both melena and BRBPR. Admits to being lightheaded 2x last week, but it passed without him giving it much thought. Reports that this is the first time this has ever happened to him. Denies syncopizing. Denies N/V and abdominal pain. He is on aspirin and plavix for CAD s/p stents in 11/2017, and the combination of dual antiplatelet therapy with large volume of hematochezia place the patient at risk for being hemodynamically unstable. He denies cp, sob, NSAID use, .         On 10/2017 he had an inferior wall STEMI with 2 ITZEL placed in RCA. He was found to have multi vessel disease and was placed on aspirin and plavix. He denies cp, sob, NSAID use,          In the ED he was hemodynamically stable, /81 with HR of 88, Hgb 13.3 with MCV 86.5, INR 1.08, rectal exam showed melena and hematochezia in the rectal vault and POC FOBT was positive. The combination of dual antiplatelet therapy with large volume of hematochezia place the patient at risk for being hemodynamically unstable, so the patient's plavix and aspirin were D/Cd. However, the risk of ITZEL re-epithelialization is high enough to consider restarting the patient's asiprin. Subjective: Patient is hemodynamically stable today with /76, HR 76, Hg 12. 6. He is currently not demonstrating any symptoms of hypovolemia. He still reports passing watery dark red liquid stool, but denies feeling dizzy or or lightheaded when out of bed. Patient is NPO today as he awaits his colonoscopy this afternoon.         Medications:  Reviewed    Infusion Medications    lactated ringers 50 mL/hr at 07/25/18 1064

## 2018-07-26 ENCOUNTER — APPOINTMENT (OUTPATIENT)
Dept: CT IMAGING | Age: 68
DRG: 379 | End: 2018-07-26
Payer: MEDICARE

## 2018-07-26 LAB
ANION GAP SERPL CALCULATED.3IONS-SCNC: 11 MMOL/L (ref 3–16)
BASOPHILS ABSOLUTE: 0.1 K/UL (ref 0–0.2)
BASOPHILS RELATIVE PERCENT: 0.8 %
BILIRUBIN URINE: NEGATIVE
BLOOD, URINE: NEGATIVE
BUN BLDV-MCNC: 11 MG/DL (ref 7–20)
CALCIUM SERPL-MCNC: 9.2 MG/DL (ref 8.3–10.6)
CEA: 2.1 NG/ML (ref 0–5)
CHLORIDE BLD-SCNC: 108 MMOL/L (ref 99–110)
CLARITY: CLEAR
CO2: 25 MMOL/L (ref 21–32)
COLOR: YELLOW
CREAT SERPL-MCNC: 0.7 MG/DL (ref 0.8–1.3)
EOSINOPHILS ABSOLUTE: 0.2 K/UL (ref 0–0.6)
EOSINOPHILS RELATIVE PERCENT: 2 %
GFR AFRICAN AMERICAN: >60
GFR NON-AFRICAN AMERICAN: >60
GLUCOSE BLD-MCNC: 104 MG/DL (ref 70–99)
GLUCOSE URINE: NEGATIVE MG/DL
HCT VFR BLD CALC: 39.2 % (ref 40.5–52.5)
HCT VFR BLD CALC: 39.4 % (ref 40.5–52.5)
HEMOGLOBIN: 13 G/DL (ref 13.5–17.5)
HEMOGLOBIN: 13.1 G/DL (ref 13.5–17.5)
KETONES, URINE: 15 MG/DL
LEUKOCYTE ESTERASE, URINE: NEGATIVE
LYMPHOCYTES ABSOLUTE: 2.1 K/UL (ref 1–5.1)
LYMPHOCYTES RELATIVE PERCENT: 26.9 %
MAGNESIUM: 2 MG/DL (ref 1.8–2.4)
MCH RBC QN AUTO: 28.3 PG (ref 26–34)
MCHC RBC AUTO-ENTMCNC: 33 G/DL (ref 31–36)
MCV RBC AUTO: 85.6 FL (ref 80–100)
MICROSCOPIC EXAMINATION: ABNORMAL
MONOCYTES ABSOLUTE: 0.7 K/UL (ref 0–1.3)
MONOCYTES RELATIVE PERCENT: 8.6 %
NEUTROPHILS ABSOLUTE: 4.9 K/UL (ref 1.7–7.7)
NEUTROPHILS RELATIVE PERCENT: 61.7 %
NITRITE, URINE: NEGATIVE
PDW BLD-RTO: 13.6 % (ref 12.4–15.4)
PH UA: 6
PLATELET # BLD: 244 K/UL (ref 135–450)
PMV BLD AUTO: 8.7 FL (ref 5–10.5)
POTASSIUM REFLEX MAGNESIUM: 3.9 MMOL/L (ref 3.5–5.1)
PROTEIN UA: NEGATIVE MG/DL
RBC # BLD: 4.61 M/UL (ref 4.2–5.9)
SODIUM BLD-SCNC: 144 MMOL/L (ref 136–145)
SPECIFIC GRAVITY UA: 1.02
URINE TYPE: ABNORMAL
UROBILINOGEN, URINE: 0.2 E.U./DL
WBC # BLD: 7.9 K/UL (ref 4–11)

## 2018-07-26 PROCEDURE — 99223 1ST HOSP IP/OBS HIGH 75: CPT | Performed by: INTERNAL MEDICINE

## 2018-07-26 PROCEDURE — 85025 COMPLETE CBC W/AUTO DIFF WBC: CPT

## 2018-07-26 PROCEDURE — 94761 N-INVAS EAR/PLS OXIMETRY MLT: CPT

## 2018-07-26 PROCEDURE — 83735 ASSAY OF MAGNESIUM: CPT

## 2018-07-26 PROCEDURE — 6360000004 HC RX CONTRAST MEDICATION: Performed by: INTERNAL MEDICINE

## 2018-07-26 PROCEDURE — 36415 COLL VENOUS BLD VENIPUNCTURE: CPT

## 2018-07-26 PROCEDURE — 85014 HEMATOCRIT: CPT

## 2018-07-26 PROCEDURE — 85018 HEMOGLOBIN: CPT

## 2018-07-26 PROCEDURE — 2580000003 HC RX 258: Performed by: STUDENT IN AN ORGANIZED HEALTH CARE EDUCATION/TRAINING PROGRAM

## 2018-07-26 PROCEDURE — 80048 BASIC METABOLIC PNL TOTAL CA: CPT

## 2018-07-26 PROCEDURE — C9113 INJ PANTOPRAZOLE SODIUM, VIA: HCPCS | Performed by: STUDENT IN AN ORGANIZED HEALTH CARE EDUCATION/TRAINING PROGRAM

## 2018-07-26 PROCEDURE — 6360000002 HC RX W HCPCS: Performed by: STUDENT IN AN ORGANIZED HEALTH CARE EDUCATION/TRAINING PROGRAM

## 2018-07-26 PROCEDURE — 99232 SBSQ HOSP IP/OBS MODERATE 35: CPT | Performed by: HOSPITALIST

## 2018-07-26 PROCEDURE — 81003 URINALYSIS AUTO W/O SCOPE: CPT

## 2018-07-26 PROCEDURE — 82378 CARCINOEMBRYONIC ANTIGEN: CPT

## 2018-07-26 PROCEDURE — 74177 CT ABD & PELVIS W/CONTRAST: CPT

## 2018-07-26 PROCEDURE — 94664 DEMO&/EVAL PT USE INHALER: CPT

## 2018-07-26 PROCEDURE — 94150 VITAL CAPACITY TEST: CPT

## 2018-07-26 PROCEDURE — 99222 1ST HOSP IP/OBS MODERATE 55: CPT | Performed by: SURGERY

## 2018-07-26 PROCEDURE — 71260 CT THORAX DX C+: CPT

## 2018-07-26 PROCEDURE — 1200000000 HC SEMI PRIVATE

## 2018-07-26 PROCEDURE — 6370000000 HC RX 637 (ALT 250 FOR IP): Performed by: STUDENT IN AN ORGANIZED HEALTH CARE EDUCATION/TRAINING PROGRAM

## 2018-07-26 RX ORDER — POTASSIUM CHLORIDE 20 MEQ/1
20 TABLET, EXTENDED RELEASE ORAL 2 TIMES DAILY WITH MEALS
Status: DISCONTINUED | OUTPATIENT
Start: 2018-07-26 | End: 2018-07-27 | Stop reason: HOSPADM

## 2018-07-26 RX ADMIN — ASPIRIN 81 MG CHEWABLE TABLET 81 MG: 81 TABLET CHEWABLE at 11:25

## 2018-07-26 RX ADMIN — POTASSIUM CHLORIDE 20 MEQ: 1500 TABLET, EXTENDED RELEASE ORAL at 11:21

## 2018-07-26 RX ADMIN — VERAPAMIL HYDROCHLORIDE 240 MG: 240 TABLET, FILM COATED, EXTENDED RELEASE ORAL at 21:20

## 2018-07-26 RX ADMIN — VERAPAMIL HYDROCHLORIDE 240 MG: 240 TABLET, FILM COATED, EXTENDED RELEASE ORAL at 00:22

## 2018-07-26 RX ADMIN — IOPAMIDOL 80 ML: 755 INJECTION, SOLUTION INTRAVENOUS at 10:57

## 2018-07-26 RX ADMIN — CETIRIZINE HYDROCHLORIDE 10 MG: 10 TABLET, FILM COATED ORAL at 11:25

## 2018-07-26 RX ADMIN — POTASSIUM CHLORIDE 20 MEQ: 1500 TABLET, EXTENDED RELEASE ORAL at 17:01

## 2018-07-26 RX ADMIN — MONTELUKAST SODIUM 10 MG: 10 TABLET, FILM COATED ORAL at 21:20

## 2018-07-26 RX ADMIN — IOHEXOL 50 ML: 240 INJECTION, SOLUTION INTRATHECAL; INTRAVASCULAR; INTRAVENOUS; ORAL at 10:57

## 2018-07-26 RX ADMIN — SODIUM CHLORIDE 8 MG/HR: 9 INJECTION, SOLUTION INTRAVENOUS at 06:15

## 2018-07-26 RX ADMIN — Medication 10 ML: at 00:22

## 2018-07-26 RX ADMIN — VERAPAMIL HYDROCHLORIDE 240 MG: 240 TABLET, FILM COATED, EXTENDED RELEASE ORAL at 11:25

## 2018-07-26 RX ADMIN — Medication 10 ML: at 11:26

## 2018-07-26 RX ADMIN — Medication 10 ML: at 21:20

## 2018-07-26 ASSESSMENT — PAIN SCALES - GENERAL
PAINLEVEL_OUTOF10: 0

## 2018-07-26 NOTE — PLAN OF CARE
Problem: Bowel Function - Altered:  Goal: Bowel elimination is within specified parameters  Bowel elimination is within specified parameters   Outcome: Ongoing  D: No stool last pm or overnight. Some aline rectal redness from bowel prep, but refused moisture barrier. Last H/H 12.3/37. 2. No c/o nausea overnight. Rannie  to go home to see his dog.  Has been NPO after MN for CT of abdomen in AM.   A: Cont to monitor during hourly rounds

## 2018-07-26 NOTE — PROGRESS NOTES
[address] [place] [address]  GI Progress Note        Shanika Grigsby is a 77 y.o. male patient. 1. Rectal bleeding        Admit Date: 2018    Subjective:       Patient is seen and examined. He is reporting no additional BM since admission. ROS:  Cardiovascular ROS: no chest pain or dyspnea on exertion  Gastrointestinal ROS: no abdominal pain, change in bowel habits, or black or bloody stools  Respiratory ROS: no cough, shortness of breath, or wheezing    Scheduled Meds:   potassium chloride  20 mEq Oral BID WC    aspirin  81 mg Oral Daily    cetirizine  10 mg Oral Daily    montelukast  10 mg Oral Nightly    verapamil  240 mg Oral BID    sodium chloride flush  10 mL Intravenous 2 times per day       Continuous Infusions:   lactated ringers 50 mL/hr at 18 1444       PRN Meds:  nitroGLYCERIN, sodium chloride flush, ondansetron      Objective:       Patient Vitals for the past 24 hrs:   BP Temp Temp src Pulse Resp SpO2   18 1217 (!) 142/87 98.5 °F (36.9 °C) Oral 85 20 97 %   18 1008 - - - - - 98 %   18 0822 136/71 98.5 °F (36.9 °C) Oral 69 20 97 %   18 0612 (!) 147/78 98.7 °F (37.1 °C) Oral 80 20 92 %   18 0024 137/79 - - - - -   18 0016 110/61 98.8 °F (37.1 °C) Oral 81 20 97 %   18 1707 - - - - - 96 %   18 1640 (!) 148/74 98.3 °F (36.8 °C) Oral 74 18 97 %   18 1620 118/68 - - 76 18 -   18 1600 - - - 76 16 96 %   18 1557 - - - 83 16 95 %   18 1554 127/71 - - 81 16 93 %       Exam:  VITALS:  BP (!) 142/87   Pulse 85   Temp 98.5 °F (36.9 °C) (Oral)   Resp 20   Ht 5' 11\" (1.803 m)   Wt 210 lb (95.3 kg)   SpO2 97%   BMI 29.29 kg/m²   TEMPERATURE:  Current - Temp: 98.5 °F (36.9 °C);  Max - Temp  Av.6 °F (37 °C)  Min: 98.3 °F (36.8 °C)  Max: 98.8 °F (37.1 °C)    NAD  General appearance: alert, appears stated age, cooperative and no distress  Head: Normocephalic, without obvious abnormality, atraumatic  Neck: supple, Vinayak See MD PGY2  [phone]  7/26/2018  3:19 PM     Patient feels well, no further bleeding. CT scan results noted. Pathology is pending. Discussed with Dr. Esperanza Argueta of surgery.

## 2018-07-26 NOTE — CONSULTS
General Surgery   Resident Consult Note    Reason for Consult: Rectal mass    History of Present Illness:   Charo Guaman is a 77 y.o. male with PMHx of CAD s/p ITZEL in November of 2017 on aspirin and plavix, HTN, L hip replacement in 2018, and angioedema presented to the ED on 7/24 secondary to passing a bloody bowel movement. BM is described by patient as being mostly dark blood with some brighter red blood. He reports that it was non-painful and, further, that he never experienced any abdominal pain. He has never passed a bloody bowel movement in the past. No history of hemorrhoids. He endorses two episodes of lightheadedness approximately one week ago, which were isolated, transient, self-resolving, did not involve LOC, and did not cause him any concern at the time of occurrence. Patient reports no alcohol use and no use of NSAIDs. The colonoscopy performed during this admission was his first.  Positive family history for rectal cancer in both maternal grandparents. Past Medical History:        Diagnosis Date    Angioedema     ED (erectile dysfunction) 12/2/2010    Idiopathic urticaria     MI (myocardial infarction)     Osteoarthritis     Tear of medial cartilage or meniscus of knee, current     Unspecified essential hypertension        Past Surgical History:           Procedure Laterality Date    CARDIAC SURGERY      cardiac stents x2    COLONOSCOPY N/A 7/25/2018    COLONOSCOPY WITH BIOPSY OF RECTAL MASS performed by Chidi Holley MD at [address] [place] [address]  2017    HIP SURGERY      JOINT REPLACEMENT      OTHER SURGICAL HISTORY  06/12/2018    LEFT ANTERIOR HIP ARTHROPLASTY MAKOPLASTY    MT EGD TRANSORAL BIOPSY SINGLE/MULTIPLE N/A 7/25/2018    EGD ESOPHAGOGASTRODUODENOSCOPY performed by Chidi Holley MD at [facility] ENDOSCOPY       Allergies:  Azithromycin; Diovan [valsartan]; Influenza vaccines;  Lisinopril; Macrolides and ketolides; Penicillins; Sulfa antibiotics; and Atorvastatin    Medications:   Home Meds  No current facility-administered medications on file prior to encounter. Current Outpatient Prescriptions on File Prior to Encounter   Medication Sig Dispense Refill    verapamil (VERELAN) 240 MG extended release capsule Take 240 mg by mouth 2 times daily      Cetirizine HCl 10 MG CAPS Take 1 capsule every day by oral route.  clopidogrel (PLAVIX) 75 MG tablet Take 75 mg by mouth daily      montelukast (SINGULAIR) 10 MG tablet TAKE 1 TABLET EVERY NIGHT 90 tablet 3    aspirin 81 MG tablet Take 81 mg by mouth daily      Misc Natural Products (OSTEO BI-FLEX/5-LOXIN ADVANCED PO) Take 1 tablet by mouth daily      nitroGLYCERIN (NITROSTAT) 0.4 MG SL tablet Place 1 tablet under the tongue as needed         Current Meds    potassium chloride (KLOR-CON M) extended release tablet 20 mEq BID WC   aspirin chewable tablet 81 mg Daily   lactated ringers infusion Continuous   cetirizine (ZYRTEC) tablet 10 mg Daily   montelukast (SINGULAIR) tablet 10 mg Nightly   nitroGLYCERIN (NITROSTAT) SL tablet 0.4 mg PRN   verapamil ([place] SR) extended release tablet 240 mg BID   sodium chloride flush 0.9 % injection 10 mL 2 times per day   sodium chloride flush 0.9 % injection 10 mL PRN   ondansetron (ZOFRAN) injection 4 mg Q6H PRN       Family History:   History reviewed. No pertinent family history. Social History:   TOBACCO:   reports that he quit smoking about 4 years ago. His smoking use included Cigars. He has never used smokeless tobacco.  ETOH:   reports that he does not drink alcohol. DRUGS:   reports that he does not use drugs. ROS: A 14 point review of systems was conducted, significant findings as noted in HPI. All other systems negative.     Physical exam:    Vitals:    07/26/18 0024 07/26/18 0612 07/26/18 0822 07/26/18 1008   BP: 137/79 (!) 147/78 136/71    Pulse:  80 69    Resp:  20 20    Temp:  98.7 °F (37.1 °C) 98.5 °F (36.9 °C)    TempSrc:  Oral Pending)          Assessment/Plan: This is a 77 y.o. male with PMHx of CAD s/p ITZEL in November of 2017 on aspirin and plavix, HTN, L hip replacement in 2018, and angioedema presented to the ED on 7/24 secondary to passing a bloody bowel movement.  Colonoscopy performed during current admission revealed extensive L colon diverticulosis, 2x 0.3cm polyps in ascending colon that were not removed secondary to recent Plavix, and a 3cm rectal mass that was biopsied.      - Follow-up rectal Bx pathology  - Follow-up CEA  - Will review CT with radiology  - Consult cardiology regarding necessity of Aspirin and Plavix     Delmis Romero MD PGY1  General Surgery  07/26/18  11:52 AM  [phone]

## 2018-07-26 NOTE — PROGRESS NOTES
Internal Medicine PGY-*** Resident Progress Note        PCP: Jose Ramos MD    Date of Admission: 7/24/2018    Chief Complaint: [facility] Course: Mr. Rodney Tai is a 76 yo gentleman with CAD (s/p 2 ITZEL in RCA), L hip replacement on 6/2018, and non-hereditary angioedema who presents with 1 day of BRBPR and melena. He had loose BM this am with both melena and BRBPR. Admits to being lightheaded 2x last week, but it passed without him giving it much thought. Reports that this is the first time this has ever happened to him.  Denies syncopizing. Denies N/V and abdominal pain. He is on aspirin and plavix for CAD s/p stents in 11/2017, and the combination of dual antiplatelet therapy with large volume of hematochezia place the patient at risk for being hemodynamically unstable.  He denies cp, sob, NSAID use, .         On 10/2017 he had an inferior wall STEMI with 2 ITZEL placed in RCA.  He was found to have multi vessel disease and was placed on aspirin and plavix.  He denies cp, sob, NSAID use,       In the ED he was hemodynamically stable, /81 with HR of 88, Hgb 13.3 with MCV 86.5, INR 1.08, rectal exam showed melena and hematochezia in the rectal vault and POC FOBT was positive.  The combination of dual antiplatelet therapy with large volume of hematochezia place the patient at risk for being hemodynamically unstable, so the patient's plavix and aspirin were D/Cd. Nicole Oh, the risk of ITZEL re-epithelialization is high enough to consider restarting the patient's asiprin. Patient denies history of hemorrhoids but does report a positive family history of rectal cancer in both maternal grandparents. Subjective: No acute events overnight. Patient is resting comfortably in bed today and denies any abdominal pain, cp, diarrhea, n/v, fever or chills. Colonoscopy yesterday demonstrated: 2x 0.3 cm polyps in ascending colon that were not resected due to recent Plavix and a 3 cm rectal mass that was biopsy.   Patient also received a CT abdomen and chest which was negative for metastatic disease demonstrated pulmonary nodules that can be biopsied in 6 months and a follow-up with an ultrasound of the thyroid. The patient was informed that the mass may possibly be cancer and he seems to be taking the news well. Medications:  Reviewed    Infusion Medications    lactated ringers 50 mL/hr at 07/25/18 1444     Scheduled Medications    potassium chloride  20 mEq Oral BID WC    aspirin  81 mg Oral Daily    cetirizine  10 mg Oral Daily    montelukast  10 mg Oral Nightly    verapamil  240 mg Oral BID    sodium chloride flush  10 mL Intravenous 2 times per day     PRN Meds: nitroGLYCERIN, sodium chloride flush, ondansetron      Intake/Output Summary (Last 24 hours) at 07/26/18 1316  Last data filed at 07/26/18 1222   Gross per 24 hour   Intake              661 ml   Output                0 ml   Net              661 ml       Physical Exam Performed:    BP (!) 142/87   Pulse 85   Temp 98.5 °F (36.9 °C) (Oral)   Resp 20   Ht 5' 11\" (1.803 m)   Wt 210 lb (95.3 kg)   SpO2 97%   BMI 29.29 kg/m²     General appearance:  No apparent distress, appears stated age and cooperative. HEENT:  Normal cephalic, atraumatic without obvious deformity. Pupils equal, round, and reactive to light.  Extra ocular muscles intact. Conjunctivae/corneas clear. Neck: Supple, with full range of motion. No jugular venous distention. Trachea midline. Respiratory:  Normal respiratory effort. Clear to auscultation, bilaterally without Rales/Wheezes/Rhonchi. Cardiovascular:  Regular rate and rhythm with normal S1/S2 without murmurs, rubs or gallops. Abdomen: Soft, non-tender, non-distended with normal bowel sounds. Musculoskeletal:  No clubbing, cyanosis or edema bilaterally.  Full range of motion without deformity. Skin: Bruise on L hand  Neurologic:  Neurovascularly intact without any focal sensory/motor deficits.  Cranial nerves: II-XII intact, followed in 6 months. 2. Ultrasound of the thyroid is recommended. ABDOMEN/PELVIS:         1. Diverticulosis most prominent in the descending colon with focal fat stranding in the distal descending colon suggestive of focal diverticulitis if the patient is symptomatic. Direct visualization can be obtained once the patient's acute symptoms are    resolved. No CT evidence of metastasis. 2. Cholelithiasis without CT evidence of acute cholecystitis. 3. Right renal cyst and nonspecific right perinephric fat stranding. Assessment/Plan:    Ella No, 77 y.o. male w/ PMHx of CAD (s/p 2 ITZEL in RCA), L hip replacement, and non hereditary angioedema presented with BRBPR and melena. Admitted for rectal bleeding in setting of no historical colonoscop. Suspicion for lower GI bleed as etiology. Active Hospital Problems    Diagnosis Date Noted    Rectal bleeding [K62.5] 07/24/2018    Coronary artery disease due to lipid rich plaque [I25.10, I25.83]      BRBPR and Melena   Never had colonoscopy in past. FOBT+ in ED and started on Protonix gtt after 80 mg bolus in the ED. Was on clear liquids then NPO at midnight for possible scope. GI consulted in ED. Hgb on admission was 13 and has stabilized with no acute drop (13.3-->13.1-->12.8-->12.6)  -Colonoscpy with GI on 7/25: 2x 0.3 cm polyps (not biopsied due to recent Plavix) and 3cm rectal mass that was biopsied. Pending CEA and biopsy result  -CT abdomen and CT chest negative for evidence of metastatic spread.   -Radiology rec: follow up with pulmonary nodules in 6 months and thyroid ultrasound  -H/H q8h  -cont Protonix gtt pending colonoscopy result   -daily CBC and BMP w/ Mg   -Type and screen ready   -Transfuse if Hgb<8      HTN  Patient slightly hypertensive on admission but otherwise HDS. -cont home verapamil      Non-hereditary Angioedema   Unclear of when diagnosed.   -cont home zyrtec and Singulair      CAD   S/p 2 ITZEL in RCA in November 2017. Was on Plavix and ASA.  EKG in ED was NSR.   -cont ASA, but hold Plavix     DVT Prophylaxis: SCD's  Stopped Protonix gtt  Diet: DIET GENERAL;  Code Status: Full Code    PT/OT Eval Status: Not needed    Dispo - [facility] wards    Omaira Santoro    I will discuss the patient with the senior resident and MD Omaira Munoz MD/DO   Internal Medicine Resident PGY-***  Pager: [phone]

## 2018-07-26 NOTE — CARE COORDINATION
Spoke with Spouse    Pt currently in [phone] [facility] for test. Spoke with spouse. She said pt is doing fine with his THR. He started PT Monday 7/23 at  [facility]. Pt admitted for problem not related to ortho. Pt manages own meds and affordable. 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 Outpatient therapy: [facility]. Do you have all of your medications: Yes    Changes in medications: To be determined. Care Transitions will continue to follow. [id number] Transition Coordinator  [phone]  [email or web address]      Follow up appointments:    Future Appointments  Date Time Provider Rafal Guillermo   7/26/2018 4:35 PM [facility] CT RM 1 [facility] CT [place]   7/30/2018 9:00 AM Vinnie Siegel PTA [address] [facility] None   8/16/2018 1:10 PM MD DANNY Carbajal AND ARGELIA

## 2018-07-26 NOTE — CONSULTS
atraumatic, no cyanosis or edema   Pulses: 2+ and symmetric   Skin: Skin color, texture, turgor normal, no rashes or lesions   Pysch: Normal mood and affect   Neurologic: Normal gross motor and sensory exam.  Cranial nerves intact        Labs:     Recent Labs      07/24/18   1139  07/25/18   0705  07/26/18   0656   NA  141  143  144   K  3.8  4.0  3.9   BUN  11  14  11   CREATININE  0.9  0.8  0.7*   CL  103  106  108   CO2  25  26  25   GLUCOSE  125*  107*  104*   CALCIUM  9.8  9.5  9.2   MG   --   2.00  2.00     Recent Labs      07/24/18   1139   07/25/18   0150  07/25/18   0705  07/25/18   1809  07/25/18   2332  07/26/18   0656   WBC  8.1   --    --   8.4   --    --   7.9   HGB  13.3*   < >  12.8*  12.6*  12.6*  12.3*  13.0*  13.1*   HCT  40.1*   < >  38.6*  38.1*  38.2*  37.2*  39.4*  39.2*   PLT  266   --    --   253   --    --   244   MCV  86.5   --    --   85.7   --    --   85.6    < > = values in this interval not displayed. No results for input(s): CHOLTOT, TRIG, HDL in the last 72 hours. Invalid input(s): LIPIDCOMM, CHOLHDL, VLDCHOL, LDL  Recent Labs      07/24/18   1138   INR  1.08     No results for input(s): CKTOTAL, CKMB, CKMBINDEX, TROPONINI in the last 72 hours. No results for input(s): BNP in the last 72 hours. No results for input(s): TSH in the last 72 hours. No results for input(s): CHOL, HDL, LDLCALC, TRIG in the last 72 hours.]    No results found for: CKTOTAL, CKMB, CKMBINDEX, TROPONINI      Imaging:     Last ECG (if available):    EKG:  I have reviewed EKG with the following interpretation:  Impression:  NSR, old inferior MI    Last Stress (if available):    Last TTE/CHRISTINE(if available): - Left ventricle: The cavity size was normal. Wall thickness was    normal. Systolic function was mildly to moderately reduced.  The    estimated ejection fraction was in the range of 40% to 45%.    Akinesis of the entireinferolateral and inferior myocardium.    Abnormal relaxation with normal filling pressures. - Aortic valve: Mild regurgitation.  - Mitral valve: Mildly calcified annulus. Mildly thickened leaflets,    .  - Right ventricle: Systolic function was normal by objective    interpretation.  - Pulmonic valve: Peak gradient (S): 7mm Hg. Last Cath (if available):    Last CMR  (if available):      Assessment / Plan:     CAD  - Currently asymptomatic.   - Event though he has not completed a full year of DAPT after his MI (Oct 2017), it is reasonable to hold Plavix at this time to complete the work up necessary. It was discussed with the patient and he understands the risks. - Would like to continue ASA 81 mg if possible and resume Plavix as soon as possible. - not on statin 2/2 allergy    Ischemic cardiomyppahty  - no CHF like sxs, euvolemic   - not on ACEI 2/2 allergy   - would like him to be on a beta blocker but it seems that he had some adverse reaction and he in on verapamil. CORE MEASURES:  · LVEF documented: Yes  · ACEI for LV dysfunction: No (allergy)  · Smoking Cessation:Yes  · Beta Blocker: No(unclear why, on verapamil)  · CCS Anginal Class: I  · Heart Failure Class: I      Tobacco use was discussed with the patient and educated on the negative effects. I have asked the patient to not utilize these agents. Thank you for allowing to us to participate in the care or Mariam Mane. All questions and concerns were addressed to the patient/family. Alternatives to my treatment were discussed. The note was completed using EMR. Every effort was made to ensure accuracy; however, inadvertent computerized transcription errors may be present.     Naun Lei MD

## 2018-07-26 NOTE — PROGRESS NOTES
Internal Medicine PGY-1 Resident Progress Note        PCP: Melonie Isaac MD    Date of Admission: 7/24/2018    Chief Complaint: BRBPR and melena     Hospital Course:  Mr. Jess Anderson is a 78 yo gentleman with CAD (s/p 2 ITZEL in RCA), L hip replacement on 6/2018, and non-hereditary angioedema who presents with 1 day of BRBPR and melena. He had loose BM this am with both melena and BRBPR. Admits to being lightheaded 2x last week, but it passed without him giving it much thought. Reports that this is the first time this has ever happened to him. Denies syncopizing. Denies N/V and abdominal pain. He is on aspirin and plavix for CAD s/p stents in 11/2017, and the combination of dual antiplatelet therapy with large volume of hematochezia place the patient at risk for being hemodynamically unstable. He denies cp, sob, NSAID use, .         On 10/2017 he had an inferior wall STEMI with 2 ITZEL placed in RCA. He was found to have multi vessel disease and was placed on aspirin and plavix. He denies cp, sob, NSAID use,          In the ED he was hemodynamically stable, /81 with HR of 88, Hgb 13.3 with MCV 86.5, INR 1.08, rectal exam showed melena and hematochezia in the rectal vault and POC FOBT was positive. The combination of dual antiplatelet therapy with large volume of hematochezia place the patient at risk for being hemodynamically unstable, so the patient's plavix and aspirin were D/Cd. However, the risk of ITZEL re-epithelialization is high enough to consider restarting the patient's asiprin. Subjective: No acute events overnight. Patient is resting comfortably in bed today and denies any abdominal pain, cp, diarrhea, n/v, fever or chills. Colonoscopy yesterday demonstrated: 2x 0.3 cm polyps in ascending colon that were not resected due to recent Plavix and a 3 cm rectal mass that was biopsy.   Patient also received a CT abdomen and chest which was negative for metastatic disease demonstrated pulmonary nodules that Brisk,< 3 seconds   Peripheral Pulses: +2 palpable, equal bilaterally     Labs:   Recent Labs      07/24/18   1139   07/25/18   0705  07/25/18   1809  07/25/18   2332  07/26/18   0656   WBC  8.1   --   8.4   --    --   7.9   HGB  13.3*   < >  12.6*  12.6*  12.3*  13.0*  13.1*   HCT  40.1*   < >  38.1*  38.2*  37.2*  39.4*  39.2*   PLT  266   --   253   --    --   244    < > = values in this interval not displayed. Recent Labs      07/24/18   1139  07/25/18   0705  07/26/18   0656   NA  141  143  144   K  3.8  4.0  3.9   CL  103  106  108   CO2  25 26 25   BUN  11  14  11   CREATININE  0.9  0.8  0.7*   CALCIUM  9.8  9.5  9.2       Recent Labs      07/24/18   1138   INR  1.08       Urinalysis:      Lab Results   Component Value Date    NITRU Negative 07/26/2018    BLOODU Negative 07/26/2018    SPECGRAV 1.020 07/26/2018    GLUCOSEU Negative 07/26/2018       Radiology:  CT ABDOMEN PELVIS W IV CONTRAST Additional Contrast? Oral   Final Result      CHEST:         1. Multiple bilateral pulmonary nodules can be followed in 6 months. 2. Ultrasound of the thyroid is recommended. ABDOMEN/PELVIS:         1. Diverticulosis most prominent in the descending colon with focal fat stranding in the distal descending colon suggestive of focal diverticulitis if the patient is symptomatic. Direct visualization can be obtained once the patient's acute symptoms are    resolved. No CT evidence of metastasis. 2. Cholelithiasis without CT evidence of acute cholecystitis. 3. Right renal cyst and nonspecific right perinephric fat stranding. CT CHEST W CONTRAST   Final Result      CHEST:         1. Multiple bilateral pulmonary nodules can be followed in 6 months. 2. Ultrasound of the thyroid is recommended. ABDOMEN/PELVIS:         1.  Diverticulosis most prominent in the descending colon with focal fat stranding in the distal descending colon suggestive of focal diverticulitis if the patient is Shay Christy MD    I will discuss the patient with the senior resident and MD Shay Rebolledo MD  Internal Medicine Resident PGY-1  Pager: [phone]       Addendum to Resident H& P/Progress note:  I have personally seen,examined and evaluated the patient. I have reviewed the current history, physical findings, labs and assessment and plan and agree with note as documented by resident MD ( )  Discussed the patient's condition with Dr Shaila Martinez, GI.  CT scan of the abd / pelvis:  1. Diverticulosis most prominent in the descending colon with focal fat stranding in the distal descending colon suggestive of focal diverticulitis if the patient is symptomatic. Direct visualization can be obtained once the patient's acute symptoms are    resolved.  No CT evidence of metastasis.       2. Cholelithiasis without CT evidence of acute cholecystitis. 3. Right renal cyst and nonspecific right perinephric fat stranding. Chest CT:   1. Multiple bilateral pulmonary nodules can be followed in 6 months. 2. Ultrasound of the thyroid is recommended.          Chrissie Adkins MD, FACP

## 2018-07-27 ENCOUNTER — CARE COORDINATION (OUTPATIENT)
Dept: CASE MANAGEMENT | Age: 68
End: 2018-07-27

## 2018-07-27 VITALS
SYSTOLIC BLOOD PRESSURE: 137 MMHG | BODY MASS INDEX: 27.92 KG/M2 | OXYGEN SATURATION: 97 % | WEIGHT: 199.4 LBS | DIASTOLIC BLOOD PRESSURE: 65 MMHG | HEART RATE: 65 BPM | RESPIRATION RATE: 18 BRPM | HEIGHT: 71 IN | TEMPERATURE: 98.1 F

## 2018-07-27 LAB
ANION GAP SERPL CALCULATED.3IONS-SCNC: 11 MMOL/L (ref 3–16)
BASOPHILS ABSOLUTE: 0.1 K/UL (ref 0–0.2)
BASOPHILS RELATIVE PERCENT: 0.9 %
BUN BLDV-MCNC: 10 MG/DL (ref 7–20)
CALCIUM SERPL-MCNC: 9.6 MG/DL (ref 8.3–10.6)
CHLORIDE BLD-SCNC: 106 MMOL/L (ref 99–110)
CO2: 24 MMOL/L (ref 21–32)
CREAT SERPL-MCNC: 0.7 MG/DL (ref 0.8–1.3)
EOSINOPHILS ABSOLUTE: 0.2 K/UL (ref 0–0.6)
EOSINOPHILS RELATIVE PERCENT: 2.7 %
GFR AFRICAN AMERICAN: >60
GFR NON-AFRICAN AMERICAN: >60
GLUCOSE BLD-MCNC: 121 MG/DL (ref 70–99)
HCT VFR BLD CALC: 39.2 % (ref 40.5–52.5)
HEMOGLOBIN: 12.9 G/DL (ref 13.5–17.5)
LYMPHOCYTES ABSOLUTE: 2.6 K/UL (ref 1–5.1)
LYMPHOCYTES RELATIVE PERCENT: 31.7 %
MAGNESIUM: 2.1 MG/DL (ref 1.8–2.4)
MCH RBC QN AUTO: 28.5 PG (ref 26–34)
MCHC RBC AUTO-ENTMCNC: 32.9 G/DL (ref 31–36)
MCV RBC AUTO: 86.5 FL (ref 80–100)
MONOCYTES ABSOLUTE: 0.7 K/UL (ref 0–1.3)
MONOCYTES RELATIVE PERCENT: 8.9 %
NEUTROPHILS ABSOLUTE: 4.7 K/UL (ref 1.7–7.7)
NEUTROPHILS RELATIVE PERCENT: 55.8 %
PDW BLD-RTO: 13.4 % (ref 12.4–15.4)
PLATELET # BLD: 267 K/UL (ref 135–450)
PMV BLD AUTO: 8.5 FL (ref 5–10.5)
POTASSIUM REFLEX MAGNESIUM: 4.2 MMOL/L (ref 3.5–5.1)
RBC # BLD: 4.53 M/UL (ref 4.2–5.9)
SODIUM BLD-SCNC: 141 MMOL/L (ref 136–145)
WBC # BLD: 8.3 K/UL (ref 4–11)

## 2018-07-27 PROCEDURE — 2580000003 HC RX 258: Performed by: STUDENT IN AN ORGANIZED HEALTH CARE EDUCATION/TRAINING PROGRAM

## 2018-07-27 PROCEDURE — 80048 BASIC METABOLIC PNL TOTAL CA: CPT

## 2018-07-27 PROCEDURE — 6370000000 HC RX 637 (ALT 250 FOR IP): Performed by: STUDENT IN AN ORGANIZED HEALTH CARE EDUCATION/TRAINING PROGRAM

## 2018-07-27 PROCEDURE — 85025 COMPLETE CBC W/AUTO DIFF WBC: CPT

## 2018-07-27 PROCEDURE — 83735 ASSAY OF MAGNESIUM: CPT

## 2018-07-27 PROCEDURE — 36415 COLL VENOUS BLD VENIPUNCTURE: CPT

## 2018-07-27 PROCEDURE — 99238 HOSP IP/OBS DSCHRG MGMT 30/<: CPT | Performed by: HOSPITALIST

## 2018-07-27 RX ADMIN — VERAPAMIL HYDROCHLORIDE 240 MG: 240 TABLET, FILM COATED, EXTENDED RELEASE ORAL at 09:41

## 2018-07-27 RX ADMIN — Medication 10 ML: at 09:41

## 2018-07-27 RX ADMIN — CETIRIZINE HYDROCHLORIDE 10 MG: 10 TABLET, FILM COATED ORAL at 09:41

## 2018-07-27 RX ADMIN — POTASSIUM CHLORIDE 20 MEQ: 1500 TABLET, EXTENDED RELEASE ORAL at 09:41

## 2018-07-27 RX ADMIN — ASPIRIN 81 MG CHEWABLE TABLET 81 MG: 81 TABLET CHEWABLE at 09:43

## 2018-07-27 ASSESSMENT — PAIN SCALES - GENERAL
PAINLEVEL_OUTOF10: 0

## 2018-07-27 NOTE — PROGRESS NOTES
Spoke with Dr. Odalys Lisa, GI. Once medically stable, patient ok to be discharged. DOUGIS planned for Tuesday, 7/31.     Juan Pablo Dobbins MD PGY-2  07/27/18  11:09 AM  [phone]

## 2018-07-27 NOTE — DISCHARGE SUMMARY
continued to deny abd pain, n/v, fevers, changes in bowel patterns, but did did continue to report blood in his stool. BRBPR 2/2 rectal mass  -Patient has never had a colonoscopy and has a positive family history of rectal cancer in both of his maternal grandparents. Was FOBT+ in ED but was hemodynamically stable. Colonscopy revealed 2x 0.3 cm polyps in ascending colon, extensive left colon diverticulosis and 3 cm rectal mass which was biopsied and found to be a noninvasive tubular adenoma. CT chest/abd was negative for metastatic disease. Radiology rec: f/u on bilateral pulmonary nodules in 6 months. GI and colorectal surgery consulted. Patient is scheduled for colonoscopy next Monday. Pending colorectal surgery recs. Disposition:  {DISPOSITIONS:[id number]}    Physical Exam Performed:     /65   Pulse 65   Temp 98.1 °F (36.7 °C) (Oral)   Resp 18   Ht 5' 11\" (1.803 m)   Wt 199 lb 6.4 oz (90.4 kg)   SpO2 97%   BMI 27.81 kg/m²       General appearance: No apparent distress, appears stated age and cooperative. HEENT: Pupils equal, round, and reactive to light. Conjunctivae/corneas clear. Neck: Supple, with full range of motion. No jugular venous distention. Trachea midline. Respiratory:  Normal respiratory effort. Clear to auscultation, bilaterally without Rales/Wheezes/Rhonchi. Cardiovascular: Regular rate and rhythm with normal S1/S2 without murmurs, rubs or gallops. Abdomen: Soft, non-tender, non-distended with normal bowel sounds. Musculoskeletal: No clubbing, cyanosis or edema bilaterally. Full range of motion without deformity. Skin: Skin color, texture, turgor normal.  No rashes or lesions. Neurologic:  Neurovascularly intact without any focal sensory/motor deficits.  Cranial nerves: II-XII intact, grossly non-focal.  Psychiatric: Alert and oriented, thought content appropriate, normal insight  Capillary Refill: Brisk,< 3 seconds   Peripheral Pulses: +2 palpable, equal

## 2018-07-27 NOTE — PROGRESS NOTES
Internal Medicine PGY-*** Resident Progress Note        PCP: Marine Ganser, MD    Date of Admission: 7/24/2018    Chief Complaint: Bright red blood in stool    Hospital Course:     Mr. Wade is a 78 yo gentleman with CAD (s/p 2 ITZEL in RCA), L hip replacement on 6/2018, and non-hereditary angioedema who presents with 1 day of BRBPR and melena. He had loose BM this am with both melena and BRBPR. Admits to being lightheaded 2x last week, but it passed without him giving it much thought. Reports that this is the first time this has ever happened to him.  Denies syncopizing. Denies N/V and abdominal pain. He is on aspirin and plavix for CAD s/p stents in 11/2017, and the combination of dual antiplatelet therapy with large volume of hematochezia place the patient at risk for being hemodynamically unstable.  He denies cp, sob, NSAID use, .         On 10/2017 he had an inferior wall STEMI with 2 ITZEL placed in RCA.  He was found to have multi vessel disease and was placed on aspirin and plavix.  He denies cp, sob, NSAID use,      In the ED he was hemodynamically stable, /81 with HR of 88, Hgb 13.3 with MCV 86.5, INR 1.08, rectal exam showed melena and hematochezia in the rectal vault and POC FOBT was positive.  The combination of dual antiplatelet therapy with large volume of hematochezia place the patient at risk for being hemodynamically unstable, so the patient's plavix and aspirin were D/Cd. Henretta Callaway, the risk of ITZEL re-epithelialization is high enough to consider restarting the patient's asiprin.      Subjective: No acute events overnight. Patient was resting comfortably in a recliner chair this AM and denies any abdominal pain, diarrhea, n/v, fever, or chills. Still pending results from colonoscopy. Patient was seen by Gi last night: they said he should be cleared for D/C today and they want to follow up with another colonoscope on Monday.   GI said there was a lot of blood on the last colonoscope and he will have Soft, non-tender, non-distended with normal bowel sounds. Musculoskeletal:  No clubbing, cyanosis or edema bilaterally.  Full range of motion without deformity. Skin: Bruise on L hand  Neurologic:  Neurovascularly intact without any focal sensory/motor deficits. Cranial nerves: II-XII intact, grossly non-focal.  Psychiatric:  Alert and oriented, thought content appropriate, normal insight  Capillary Refill: Brisk,< 3 seconds   Peripheral Pulses: +2 palpable, equal bilaterally    Labs:   Recent Labs      07/24/18   1139   07/25/18   0705  07/25/18   1809  07/25/18   2332  07/26/18   0656   WBC  8.1   --   8.4   --    --   7.9   HGB  13.3*   < >  12.6*  12.6*  12.3*  13.0*  13.1*   HCT  40.1*   < >  38.1*  38.2*  37.2*  39.4*  39.2*   PLT  266   --   253   --    --   244    < > = values in this interval not displayed. Recent Labs      07/24/18   1139  07/25/18   0705  07/26/18   0656   NA  141  143  144   K  3.8  4.0  3.9   CL  103  106  108   CO2  25  26  25   BUN  11  14  11   CREATININE  0.9  0.8  0.7*   CALCIUM  9.8  9.5  9.2     No results for input(s): AST, ALT, BILIDIR, BILITOT, ALKPHOS in the last 72 hours. Recent Labs      07/24/18   1138   INR  1.08     No results for input(s): [place] Reasoner in the last 72 hours. Urinalysis:    Lab Results   Component Value Date    NITRU Negative 07/26/2018    BLOODU Negative 07/26/2018    SPECGRAV 1.020 07/26/2018    GLUCOSEU Negative 07/26/2018       Radiology:  CT ABDOMEN PELVIS W IV CONTRAST Additional Contrast? Oral   Final Result      CHEST:         1. Multiple bilateral pulmonary nodules can be followed in 6 months. 2. Ultrasound of the thyroid is recommended. ABDOMEN/PELVIS:         1. Diverticulosis most prominent in the descending colon with focal fat stranding in the distal descending colon suggestive of focal diverticulitis if the patient is symptomatic.  Direct visualization can be obtained once the patient's acute symptoms are resolved. No CT evidence of metastasis. 2. Cholelithiasis without CT evidence of acute cholecystitis. 3. Right renal cyst and nonspecific right perinephric fat stranding. CT CHEST W CONTRAST   Final Result      CHEST:         1. Multiple bilateral pulmonary nodules can be followed in 6 months. 2. Ultrasound of the thyroid is recommended. ABDOMEN/PELVIS:         1. Diverticulosis most prominent in the descending colon with focal fat stranding in the distal descending colon suggestive of focal diverticulitis if the patient is symptomatic. Direct visualization can be obtained once the patient's acute symptoms are    resolved. No CT evidence of metastasis. 2. Cholelithiasis without CT evidence of acute cholecystitis. 3. Right renal cyst and nonspecific right perinephric fat stranding. Assessment/Plan:    Elijah Renee, 77 y.o. male w/ PMHx of CAD (s/p 2 ITZEL in RCA), L hip replacement, and non-hereditary angioedema. Admitted for BRBPR. Suspicion for lower GI bleed as etiology. Active Hospital Problems    Diagnosis Date Noted    Chronic congestive heart failure (HCC) [I50.9]     ECG abnormal [R94.31]     Rectal bleeding [K62.5] 07/24/2018    Coronary artery disease due to lipid rich plaque [I25.10, I25.83]      BRBPR and Melena 2/2 rectal mass  Never had colonoscopy in past. FOBT+ in ED and started on Protonix gtt after 80 mg bolus in the ED. Was on clear liquids then NPO at midnight for possible scope. GI consulted in ED. Hgb on admission was 13 and has stabilized with no acute drop (13.3-->13.1-->12.8-->12.6). Colonoscopy revealed 2 x 0.3 cm polyps in ascending colon, extensive left colon diverticulosis and 3 cm rectal mass.  CT chest/abd: multiple bilateral pulmonary nodules, diverticulosis in descending colon w/ focal fat stranding, no evidence of mets, chlolelithiasis  -GI consulted - planning to do another colonoscopy after the patient has been off

## 2018-07-27 NOTE — PROGRESS NOTES
Alert and oriented, thought content appropriate, normal insight  Capillary Refill: Brisk,< 3 seconds   Peripheral Pulses: +2 palpable, equal bilaterally       Labs:   Recent Labs      07/25/18   0705   07/25/18   2332  07/26/18   0656  07/27/18   0640   WBC  8.4   --    --   7.9  8.3   HGB  12.6*   < >  12.3*  13.0*  13.1*  12.9*   HCT  38.1*   < >  37.2*  39.4*  39.2*  39.2*   PLT  253   --    --   244  267    < > = values in this interval not displayed. Recent Labs      07/25/18   0705  07/26/18   0656  07/27/18   0640   NA  143  144  141   K  4.0  3.9  4.2   CL  106  108  106   CO2  26  25  24   BUN  14  11  10   CREATININE  0.8  0.7*  0.7*   CALCIUM  9.5  9.2  9.6     No results for input(s): AST, ALT, BILIDIR, BILITOT, ALKPHOS in the last 72 hours. Recent Labs      07/24/18   1138   INR  1.08     No results for input(s): Anuaj Mock in the last 72 hours. Urinalysis:    Lab Results   Component Value Date    NITRU Negative 07/26/2018    BLOODU Negative 07/26/2018    SPECGRAV 1.020 07/26/2018    GLUCOSEU Negative 07/26/2018       Radiology:  CT ABDOMEN PELVIS W IV CONTRAST Additional Contrast? Oral   Final Result      CHEST:         1. Multiple bilateral pulmonary nodules can be followed in 6 months. 2. Ultrasound of the thyroid is recommended. ABDOMEN/PELVIS:         1. Diverticulosis most prominent in the descending colon with focal fat stranding in the distal descending colon suggestive of focal diverticulitis if the patient is symptomatic. Direct visualization can be obtained once the patient's acute symptoms are    resolved. No CT evidence of metastasis. 2. Cholelithiasis without CT evidence of acute cholecystitis. 3. Right renal cyst and nonspecific right perinephric fat stranding. CT CHEST W CONTRAST   Final Result      CHEST:         1. Multiple bilateral pulmonary nodules can be followed in 6 months. 2. Ultrasound of the thyroid is recommended. to hold Plavix       DVT Prophylaxis: SCDs  Diet: DIET GENERAL;  Code Status: Full Code      Dylan Figueroa MD    I will discuss the patient with the senior resident and MD Dylan Phipps MD  PGY-1 Internal Medicine  [phone]    Addendum to Resident H& P/Progress note:  I have personally seen,examined and evaluated the patient.  I have reviewed the current history, physical findings, labs and assessment and plan and agree with note as documented by resident MD ( Venu Olvera)      Wally Younger MD, [address]

## 2018-07-27 NOTE — PROGRESS NOTES
Surgery Daily Progress Note  Patient: Rodney Eaton    CC: Gastrointestinal bleed    Subjective :  No acute events overnight. Reports that he is no longer passing bloody BMs, also stating that he continues to have no abdominal pain, no nausea, no vomiting. Tolerating diet well. ROS: A 14 point review of systems was conducted, significant findings as noted above. All other systems negative. Objective : Infusions:   lactated ringers 50 mL/hr at 07/25/18 1444        I/O:I/O last 3 completed shifts: In: 312 [P.O.:720; I.V.:101]  Out: -            Wt Readings from Last 1 Encounters:   07/26/18 199 lb 6.4 oz (90.4 kg)       Exam:/73   Pulse 77   Temp 99.1 °F (37.3 °C) (Oral)   Resp 20   Ht 5' 11\" (1.803 m)   Wt 199 lb 6.4 oz (90.4 kg)   SpO2 97%   BMI 27.81 kg/m²     General appearance: alert, in no apparent distress   Lungs: normal effort; no wheezes   Heart: regular rate and rhythm; S1, S2 normal; no murmurs, no rubs, no gallops  Abdomen: soft, non-distended, non-tender. LABS:   Recent Labs      07/25/18   0705   07/25/18   2332  07/26/18   0656   WBC  8.4   --    --   7.9   HGB  12.6*   < >  12.3*  13.0*  13.1*   HCT  38.1*   < >  37.2*  39.4*  39.2*   MCV  85.7   --    --   85.6   PLT  253   --    --   244    < > = values in this interval not displayed. Recent Labs      07/25/18   0705  07/26/18   0656   NA  143  144   K  4.0  3.9   CL  106  108   CO2  26  25   BUN  14  11   CREATININE  0.8  0.7*      No results for input(s): AST, ALT, ALB, BILIDIR, BILITOT, ALKPHOS in the last 72 hours. No results for input(s): LIPASE, AMYLASE in the last 72 hours. Recent Labs      07/24/18   1138   INR  1.08      No results for input(s): CKTOTAL, CKMB, CKMBINDEX, TROPONINI in the last 72 hours.     ASSESSMENT/PLAN: Pt. is a 77 y.o. male with PMHx of CAD s/p ITZEL in November of 2017 on aspirin and plavix, HTN, L hip replacement in 2018, and angioedema presented to the ED on 7/24

## 2018-07-27 NOTE — PLAN OF CARE
Problem: Bowel Function - Altered:  Goal: Bowel elimination is within specified parameters  Bowel elimination is within specified parameters   Outcome: Ongoing  D: No BM overnight. MN vital signs not obtained per pt's request to promote sleep. Slept all night and vs stable at 0550.    A: Cont to monitor during hourly rounds

## 2018-07-27 NOTE — PROGRESS NOTES
97.6 °F (36.4 °C); Max - Temp  Av.2 °F (36.8 °C)  Min: 97.5 °F (36.4 °C)  Max: 99.1 °F (37.3 °C)    NAD  General appearance: alert, appears stated age, cooperative and no distress  Head: Normocephalic, without obvious abnormality, atraumatic  Neck: supple, symmetrical, trachea midline and thyroid not enlarged, symmetric, no tenderness/mass/nodules  CVS:  RRR, Nl s1s2  Lungs CTA Bilaterally, normal effort  Abdomen: soft, non-tender; bowel sounds normal; no masses,  no organomegaly  AAOx3, No asterixis or encephalopathy  Extremities: No edema. Recent Labs      18   0705   18   2332  18   0656  18   0640   WBC  8.4   --    --   7.9  8.3   HGB  12.6*   < >  12.3*  13.0*  13.1*  12.9*   HCT  38.1*   < >  37.2*  39.4*  39.2*  39.2*   MCV  85.7   --    --   85.6  86.5   PLT  253   --    --   244  267    < > = values in this interval not displayed. Recent Labs      18   0705  18   0656  18   0640   NA  143  144  141   K  4.0  3.9  4.2   CL  106  108  106   CO2  26  25  24   BUN  14  11  10   CREATININE  0.8  0.7*  0.7*     No results for input(s): AST, ALT, ALB, BILIDIR, BILITOT, ALKPHOS in the last 72 hours. No results for input(s): LIPASE, AMYLASE in the last 72 hours. Recent Labs      18   1138   INR  1.08     No results for input(s): PTT in the last 72 hours. No results for input(s): OCCULTBLD in the last 72 hours. Radiology review:   CT ABDOMEN PELVIS W IV CONTRAST Additional Contrast? Oral   Final Result      CHEST:         1. Multiple bilateral pulmonary nodules can be followed in 6 months. 2. Ultrasound of the thyroid is recommended. ABDOMEN/PELVIS:         1. Diverticulosis most prominent in the descending colon with focal fat stranding in the distal descending colon suggestive of focal diverticulitis if the patient is symptomatic. Direct visualization can be obtained once the patient's acute symptoms are    resolved.   No CT evidence

## 2018-07-27 NOTE — DISCHARGE SUMMARY
volume of hematochezia place the patient at risk for being hemodynamically unstable, so the patient's plavix and aspirin were D/Cd. Nicole Oh, the risk of ITZEL re-epithelialization is high enough to consider restarting the patient's asiprin.      Problems addressed this admission:    BRBPR and Melena 2/2 rectal mass  Never had colonoscopy in past. FOBT+ in ED and started on Protonix gtt after 80 mg bolus in the ED. Was on clear liquids then NPO at midnight for possible scope. GI consulted in ED. Hgb on admission was 13 and has stabilized with no acute drop (13.3-->13.1-->12.8-->12.6). Colonoscopy revealed 2 x 0.3 cm polyps in ascending colon, extensive left colon diverticulosis and 3 cm rectal mass. CT chest/abd: multiple bilateral pulmonary nodules, diverticulosis in descending colon w/ focal fat stranding, no evidence of mets, chlolelithiasis. Colorectal surgery was consulted. The patient will undergo TAMIS on July 31,2018    HTN  Patient slightly hypertensive on admission but otherwise HDS. Cont home verapamil      Non-hereditary Angioedema   Unclear of when diagnosed. cont home zyrtec and Singulair      CAD   S/p 2 ITZEL in RCA in November 2017. Was on Plavix and ASA. EKG in ED was NSR. cont ASA, but held Plavix     On day of d/c pt had no acute events overnight. Pt denies any bloody BMs, abdominal pain, nausea, or vomiting. Pt tolerating PO diet well. Pt has no complaints and feels well.             Disposition:  Home    Physical Exam Performed:     /65   Pulse 65   Temp 98.1 °F (36.7 °C) (Oral)   Resp 18   Ht 5' 11\" (1.803 m)   Wt 199 lb 6.4 oz (90.4 kg)   SpO2 97%   BMI 27.81 kg/m²       General appearance:  No apparent distress, appears stated age and cooperative. HEENT:  Normal cephalic, atraumatic without obvious deformity. Pupils equal, round, and reactive to light. Extra ocular muscles intact. Conjunctivae/corneas clear. Neck: Supple, with full range of motion. No jugular venous distention. CT CHEST W CONTRAST   Final Result      CHEST:         1. Multiple bilateral pulmonary nodules can be followed in 6 months. 2. Ultrasound of the thyroid is recommended. ABDOMEN/PELVIS:         1. Diverticulosis most prominent in the descending colon with focal fat stranding in the distal descending colon suggestive of focal diverticulitis if the patient is symptomatic. Direct visualization can be obtained once the patient's acute symptoms are    resolved. No CT evidence of metastasis. 2. Cholelithiasis without CT evidence of acute cholecystitis. 3. Right renal cyst and nonspecific right perinephric fat stranding. Consults:     IP CONSULT TO HOSPITALIST  IP CONSULT TO RESIDENT INTERNAL MEDICINE  IP CONSULT TO GI  IP CONSULT TO GI  IP CONSULT TO COLORECTAL SURGERY  IP CONSULT TO CARDIOLOGY    Disposition:  home     Condition at Discharge: Stable    Discharge Instructions/Follow-up: Pt will follow up with Dr. Angeline Booth as outpatient for colonoscopy and further management. Pt was given phone number to office to set up an appointment. Code Status:  Prior     Activity: activity as tolerated    Diet: regular diet      Discharge Medications:     Discharge Medication List as of 7/27/2018  3:39 PM           Details   verapamil (VERELAN) 240 MG extended release capsule Take 240 mg by mouth 2 times dailyHistorical Med      Cetirizine HCl 10 MG CAPS Take 1 capsule every day by oral route. Historical Med      montelukast (SINGULAIR) 10 MG tablet TAKE 1 TABLET EVERY NIGHT, Disp-90 tablet, R-3Normal      aspirin 81 MG tablet Take 81 mg by mouth dailyHistorical Med      Misc Natural Products (OSTEO BI-FLEX/5-LOXIN ADVANCED PO) Take 1 tablet by mouth dailyHistorical Med      nitroGLYCERIN (NITROSTAT) 0.4 MG SL tablet Place 1 tablet under the tongue as neededHistorical Med             Time Spent on discharge is more than 30 minutes in the examination, evaluation, counseling and review of medications and discharge plan. Signed:    Kelli Andrade MD, PGY 1   7/27/2018    Addendum to Resident H& P/Progress note:  I have personally seen,examined and evaluated the patient.  I have reviewed the current history, physical findings, labs and assessment and plan and agree with note as documented by resident MD ( Vitaly Marques)      Sneha Mckeon MD, Azeb Mckinney

## 2018-07-30 ENCOUNTER — CARE COORDINATION (OUTPATIENT)
Dept: CASE MANAGEMENT | Age: 68
End: 2018-07-30

## 2018-07-30 ENCOUNTER — HOSPITAL ENCOUNTER (OUTPATIENT)
Dept: PHYSICAL THERAPY | Age: 68
Setting detail: THERAPIES SERIES
Discharge: HOME | End: 2018-07-30
Payer: MEDICARE

## 2018-07-30 ENCOUNTER — OFFICE VISIT (OUTPATIENT)
Dept: SURGERY | Age: 68
End: 2018-07-30

## 2018-07-30 VITALS
WEIGHT: 202 LBS | SYSTOLIC BLOOD PRESSURE: 136 MMHG | RESPIRATION RATE: 16 BRPM | HEIGHT: 71 IN | DIASTOLIC BLOOD PRESSURE: 80 MMHG | BODY MASS INDEX: 28.28 KG/M2

## 2018-07-30 DIAGNOSIS — K62.1 RECTAL POLYP: Primary | ICD-10-CM

## 2018-07-30 PROCEDURE — 3017F COLORECTAL CA SCREEN DOC REV: CPT | Performed by: SURGERY

## 2018-07-30 PROCEDURE — 1036F TOBACCO NON-USER: CPT | Performed by: SURGERY

## 2018-07-30 PROCEDURE — 4040F PNEUMOC VAC/ADMIN/RCVD: CPT | Performed by: SURGERY

## 2018-07-30 PROCEDURE — G8417 CALC BMI ABV UP PARAM F/U: HCPCS | Performed by: SURGERY

## 2018-07-30 PROCEDURE — 1111F DSCHRG MED/CURRENT MED MERGE: CPT | Performed by: SURGERY

## 2018-07-30 PROCEDURE — 1123F ACP DISCUSS/DSCN MKR DOCD: CPT | Performed by: SURGERY

## 2018-07-30 PROCEDURE — 99212 OFFICE O/P EST SF 10 MIN: CPT | Performed by: SURGERY

## 2018-07-30 PROCEDURE — G8598 ASA/ANTIPLAT THER USED: HCPCS | Performed by: SURGERY

## 2018-07-30 PROCEDURE — 1101F PT FALLS ASSESS-DOCD LE1/YR: CPT | Performed by: SURGERY

## 2018-07-30 PROCEDURE — G8427 DOCREV CUR MEDS BY ELIG CLIN: HCPCS | Performed by: SURGERY

## 2018-07-31 LAB
EKG ATRIAL RATE: 75 BPM
EKG DIAGNOSIS: NORMAL
EKG P AXIS: 46 DEGREES
EKG P-R INTERVAL: 154 MS
EKG Q-T INTERVAL: 396 MS
EKG QRS DURATION: 82 MS
EKG QTC CALCULATION (BAZETT): 442 MS
EKG R AXIS: -6 DEGREES
EKG T AXIS: 29 DEGREES
EKG VENTRICULAR RATE: 75 BPM

## 2018-08-02 ENCOUNTER — TELEPHONE (OUTPATIENT)
Dept: SURGERY | Age: 68
End: 2018-08-02

## 2018-08-02 NOTE — TELEPHONE ENCOUNTER
Yes will need to coordinate with Dr. Gill Stauffer office for complete colonoscopy. Then ideally we could do surgery next day so he doesn't need to prep twice.  Please let him know    Rayray Wheatley M.D.  8/2/18   2:46 PM

## 2018-08-03 NOTE — TELEPHONE ENCOUNTER
Spoke with patient. Due to him needing colonoscopy day prior to surgery, he prefers to wait until after son's wedding on 7/18. Will plan for colonoscopy with Dr. Karuna Deluna and Surgery the week of 8/20.

## 2018-08-06 ENCOUNTER — CARE COORDINATION (OUTPATIENT)
Dept: CASE MANAGEMENT | Age: 68
End: 2018-08-06

## 2018-08-10 ENCOUNTER — TELEPHONE (OUTPATIENT)
Dept: SURGERY | Age: 68
End: 2018-08-10

## 2018-08-10 NOTE — TELEPHONE ENCOUNTER
Spoke with staff at Dr. Lizeth Faye office. Patient is scheduled for colonoscopy on 10/16/18. As of this time, we are unable to get OR time on 10/17 for surgery with Dr. Esperanza Argueta. Attempted to reach Dr. Lizeth Faye office to see if colonoscopy can be done on a different day. Waiting for response.

## 2018-08-16 ENCOUNTER — OFFICE VISIT (OUTPATIENT)
Dept: ORTHOPEDIC SURGERY | Age: 68
End: 2018-08-16

## 2018-08-16 VITALS — SYSTOLIC BLOOD PRESSURE: 135 MMHG | HEART RATE: 61 BPM | DIASTOLIC BLOOD PRESSURE: 74 MMHG

## 2018-08-16 DIAGNOSIS — M16.12 PRIMARY OSTEOARTHRITIS OF LEFT HIP: ICD-10-CM

## 2018-08-16 DIAGNOSIS — Z96.642 STATUS POST TOTAL HIP REPLACEMENT, LEFT: Primary | ICD-10-CM

## 2018-08-16 PROCEDURE — 99024 POSTOP FOLLOW-UP VISIT: CPT | Performed by: ORTHOPAEDIC SURGERY

## 2018-08-27 ENCOUNTER — HOSPITAL ENCOUNTER (OUTPATIENT)
Dept: PHYSICAL THERAPY | Age: 68
Setting detail: THERAPIES SERIES
Discharge: HOME | End: 2018-08-27
Payer: MEDICARE

## 2018-08-27 ENCOUNTER — CARE COORDINATION (OUTPATIENT)
Dept: CASE MANAGEMENT | Age: 68
End: 2018-08-27

## 2018-08-27 PROCEDURE — 97112 NEUROMUSCULAR REEDUCATION: CPT | Performed by: PHYSICAL THERAPY ASSISTANT

## 2018-08-27 PROCEDURE — 97110 THERAPEUTIC EXERCISES: CPT | Performed by: PHYSICAL THERAPY ASSISTANT

## 2018-08-27 NOTE — FLOWSHEET NOTE
[facility]    Physical Therapy Daily Treatment Note  Date:  2018    Patient Name:  Saint Coil    :  1950  MRN: [id number]  Medical/Treatment Diagnosis Information:  · Diagnosis:  Primary Left Hip OA s/p L SUSSY 18  · Treatment Diagnosis: P46.73  Insurance/Certification information:  PT Insurance Information: Medicare/[employer] Supplemental  Physician Information:  Referring Practitioner: Bethany Lawson of care signed (Y/N):     Date of Patient follow up with Physician:     G-Code (if applicable):      Date G-Code Applied:  2018       Progress Note: [x]  Yes  []  No      Latex Allergy:  [x]NO      []YES  Preferred Language for Healthcare:   [x]English       []other:    Visit # Insurance Allowable   1 Med nec     Pain level:  0/10  in hip    SUBJECTIVE:  Reports today for 2ns visit post-op but has been hospitalized secondary to an abdominal bleed and angioedema. Hip however is progressing well. Did yard work yesterday for 6 hours without complaint. No c/o pain in the hip. Knee is a little bothersome. No trouble with up/down stairs, in/out of car.       OBJECTIVE: See eval  Observation:   Test measurements:    Patient educated on following:    RESTRICTIONS/PRECAUTIONS: Anterior Approach protocol    Exercises/Interventions:   Therapeutic Ex Wt/Sets/Reps/Hold Notes   Bike Unable secondary to knee    Eliptical          HR/ Slant Board  X30 / 3x30\"     Marches  X20 R, L     ABD  X20 R, L     Mini Squat  X20     FSU  4\" x20                   Quad Set 20 x 5\"    Glut Set 20 x 5\"    PPT 15 x 5\"    Heel Slide 15x    HL Hip Abd/Add 2 x 10  Green Tband        Long Sit Hamstring Stretch 3 x 30\"    Long Sit Gastroc Stretch 3 x 30\"    LAQ/ SAQ  3# 20 x 5\" ea    Marches supine  2# x20                                                 Manual     Gr I-II mobs for tissue reactivity     PROM-all planes     GR III-IV mobs for arthrokinematics Lumbar/long axis distraction     Thoracic PA mobs     PNF for strengthening     PNF for agonist/antagonist inhibition          Pt education/reminders 5' HEP education, POC progression     Therapeutic Exercise and NMR EXR  [x] (03743) Provided verbal/tactile cueing for activities related to strengthening, flexibility, endurance, ROM for improvements in LE, proximal hip, and core control with self care, mobility, lifting, ambulation. [x] (70479) Provided verbal/tactile cueing for activities related to improving balance, coordination, kinesthetic sense, posture, motor skill, proprioception  to assist with LE, proximal hip, and core control in self care, mobility, lifting, ambulation and eccentric single leg control. Home Exercise Program:    [x] (70410) Reviewed/Progressed HEP activities related to strengthening, flexibility, endurance, ROM of core, proximal hip and LE for functional self-care, mobility, lifting and ambulation/stair navigation   [x] (44885)Reviewed/Progressed HEP activities related to improving balance, coordination, kinesthetic sense, posture, motor skill, proprioception of core, proximal hip and LE for self care, mobility, lifting, and ambulation/stair navigation      Manual Treatments:  PROM / STM / Oscillations-Mobs:  G-I, II, III, IV (PA's, Inf., Post.)  [x] (11538) Provided manual therapy to mobilize LE, proximal hip and/or LS spine soft tissue/joints for the purpose of modulating pain, promoting relaxation,  increasing ROM, reducing/eliminating soft tissue swelling/inflammation/restriction, improving soft tissue extensibility and allowing for proper ROM for normal function with self care, mobility, lifting and ambulation.      Modalities: n/a    Charges:  Timed Code Treatment Minutes: 40   Total Treatment Minutes: 60     [x] EVAL (LOW) 85738 (typically 20 minutes face-to-face)  [] EVAL (MOD) 04664 (typically 30 minutes face-to-face)  [] EVAL (HIGH) 72755 (typically 45 minutes doing well. If he is doing well we will D/C to HEP at that time.    [x] Continue per plan of care [] Alter current plan (see comments)  [] Plan of care initiated [] Hold pending MD visit [] Discharge    Electronically signed by: Alexander Ahn PTA

## 2018-09-10 ENCOUNTER — HOSPITAL ENCOUNTER (OUTPATIENT)
Dept: PHYSICAL THERAPY | Age: 68
Setting detail: THERAPIES SERIES
Discharge: HOME | End: 2018-09-10
Payer: MEDICARE

## 2018-09-10 PROCEDURE — G8979 MOBILITY GOAL STATUS: HCPCS | Performed by: PHYSICAL THERAPY ASSISTANT

## 2018-09-10 PROCEDURE — 97110 THERAPEUTIC EXERCISES: CPT | Performed by: PHYSICAL THERAPY ASSISTANT

## 2018-09-10 PROCEDURE — G8980 MOBILITY D/C STATUS: HCPCS | Performed by: PHYSICAL THERAPY ASSISTANT

## 2018-09-10 PROCEDURE — 97112 NEUROMUSCULAR REEDUCATION: CPT | Performed by: PHYSICAL THERAPY ASSISTANT

## 2018-09-10 PROCEDURE — G8978 MOBILITY CURRENT STATUS: HCPCS | Performed by: PHYSICAL THERAPY ASSISTANT

## 2018-09-10 NOTE — FLOWSHEET NOTE
[facility]    Physical Therapy Daily Treatment Note  Date:  9/10/2018    Patient Name:  Cecilia Conley    :  1950  MRN: [id number]  Medical/Treatment Diagnosis Information:  · Diagnosis:  Primary Left Hip OA s/p L SUSSY 18  · Treatment Diagnosis: Q55.71  Insurance/Certification information:  PT Insurance Information: Medicare/Bank[employer]'s [employer] Supplemental  Physician Information:  Referring Practitioner: Nelly [facility] signed (Y/N):     Date of Patient follow up with Physician:     G-Code (if applicable):      Date G-Code Applied:  9/10/2018       Progress Note: [x]  Yes  []  No      Latex Allergy:  [x]NO      []YES  Preferred Language for Healthcare:   [x]English       []other:    Visit # Insurance Allowable   3 Med nec     Pain level:  0/10  in hip    SUBJECTIVE:  Not been feeling well secondary to angioedema. Scheduled to have surgery  and will be in hospital for 3 or 4 days. Hip is doing well. Knee is bothersome. Feels that in regards to his shoulder and considering his future surgery plans seocndary to angioedema he is ready to continue with therapy on his own at home.         OBJECTIVE: See eval  Observation:   Test measurements:  LEFS 45%  Patient educated on following:    RESTRICTIONS/PRECAUTIONS: Anterior Approach protocol    Exercises/Interventions:   Therapeutic Ex Wt/Sets/Reps/Hold Notes   Bike Unable secondary to knee    Eliptical          HR/ Slant Board  X30 / 3x30\"     Marches  2# X30 R, L     ABD  2x15 R, L     HS Curls   2x15 R, L     Mini Squat  X20     FSU  4\" x20                   Quad Set 20 x 5\"    Glut Set 20 x 5\"    PPT 15 x 5\"    Heel Slide 15x    HL Hip Abd/Add 2 x 10  Green Tband        Long Sit Hamstring Stretch 3 x 30\"    Long Sit Gastroc Stretch 3 x 30\"    LAQ/ SAQ  3# 20 x 5\" ea    Marches supine  2# x20                                                 Manual     Gr I-II mobs for tissue reactivity PROM-all planes     GR III-IV mobs for arthrokinematics     Lumbar/long axis distraction     Thoracic PA mobs     PNF for strengthening     PNF for agonist/antagonist inhibition          Pt education/reminders 5' HEP education, POC progression     Therapeutic Exercise and NMR EXR  [x] (92568) Provided verbal/tactile cueing for activities related to strengthening, flexibility, endurance, ROM for improvements in LE, proximal hip, and core control with self care, mobility, lifting, ambulation. [x] (04301) Provided verbal/tactile cueing for activities related to improving balance, coordination, kinesthetic sense, posture, motor skill, proprioception  to assist with LE, proximal hip, and core control in self care, mobility, lifting, ambulation and eccentric single leg control. Home Exercise Program:    [x] (57529) Reviewed/Progressed HEP activities related to strengthening, flexibility, endurance, ROM of core, proximal hip and LE for functional self-care, mobility, lifting and ambulation/stair navigation   [x] (16781)Reviewed/Progressed HEP activities related to improving balance, coordination, kinesthetic sense, posture, motor skill, proprioception of core, proximal hip and LE for self care, mobility, lifting, and ambulation/stair navigation      Manual Treatments:  PROM / STM / Oscillations-Mobs:  G-I, II, III, IV (PA's, Inf., Post.)  [x] (85215) Provided manual therapy to mobilize LE, proximal hip and/or LS spine soft tissue/joints for the purpose of modulating pain, promoting relaxation,  increasing ROM, reducing/eliminating soft tissue swelling/inflammation/restriction, improving soft tissue extensibility and allowing for proper ROM for normal function with self care, mobility, lifting and ambulation.      Modalities: n/a    Charges:  Timed Code Treatment Minutes: 60   Total Treatment Minutes: 60     [] EVAL (LOW) 56706 (typically 20 minutes face-to-face)  [] EVAL (MOD) 34641 (typically 30 minutes home exercises and getting on a walking program for continued strengthening to prevent additional future issues. Patient has several other medical issues/surgeries he will be dealing with over the next few months and currently feels his plate is full.   [] Continue per plan of care [] Alter current plan (see comments)  [] Plan of care initiated [] Hold pending MD visit [x] Discharge    Electronically signed by: Zeke Spaulding PTA

## 2018-09-12 ENCOUNTER — CARE COORDINATION (OUTPATIENT)
Dept: CASE MANAGEMENT | Age: 68
End: 2018-09-12

## 2018-09-12 NOTE — CARE COORDINATION
Spoke with Jenn Patton for final [facility] ortho bundle follow up call. States he's doing well, cutting grass, going to the store and doing all his normal activities. Denies any hip pain but states his knees have been bothering him and he continues to see ortho regarding this. He has colonoscopy and surgery coming up 10/8 and 10/9, and denies any questions or concerns at this time. He has completed rehab. Encouraged pt to call PCP for future questions or concerns.      Follow up appointments:    Future Appointments  Date Time Provider Rafal Guillermo   10/24/2018 9:00 AM Patrice Flores, [address] [place] [place]   [phone]

## 2018-10-08 ENCOUNTER — ANESTHESIA EVENT (OUTPATIENT)
Dept: OPERATING ROOM | Age: 68
DRG: 395 | End: 2018-10-08
Payer: MEDICARE

## 2018-10-08 NOTE — PROGRESS NOTES
[address] [employer]                          Date of Procedure 10/9/18 Time of Procedure 0900    PRIOR TO PROCEDURE DATE:  1. Please follow any guidelines/instructions prior to your procedure as advised by your surgeon. 2. Arrange for someone to drive you home and be with you for the first 24 hours after discharge for your safety after your procedure for which you received sedation. Ensure it is someone we can share information with regarding your discharge. 3. You must contact your surgeon for instructions IF:   You are taking any blood thinners, aspirin, anti-inflammatory or vitamin E.   There is a change in your physical condition such as a cold, fever, rash, cuts, sores or any other infection, especially near your surgical site. 4. Do not drink alcohol the day before or day of your procedure. 5. A Pre-op History and Physical for surgery MUST be completed by your Physician or Urgent Care within 30 days of your procedure date. Please bring a copy with you on the day of your procedure and along with any other testing performed. THE DAY OF YOUR PROCEDURE:  1. Follow instructions for ARRIVAL TIME as DIRECTED BY YOUR SURGEON. If your surgeon does not give you a specific arrival time, please arrive at 0700 PER PT    2. Enter the MAIN entrance from [address] and follow the signs to the free [employer] or [email or web address] parking (offered free of charge 6am-5pm). 3. Enter the Main Entrance of the hospital (do not enter from the lower level of the parking garage). Upon entrance, check in with the  at the main desk on your left. If no one is available at the desk, proceed into the [facility] Waiting Room and go through the door directly into the [facility]. There is a Check-in desk ACROSS from Room 5 (marked with a sign hanging from the ceiling). The phone number for the surgery center is [phone].     4. Please call [phone] option #2 option #2 if you have not been preregistered yet. On the day of your procedure bring your insurance card and photo ID. You will be registered at your bedside once brought back to your room. 5. DO NOT EAT OR DRINK ANYTHING AFTER MIDNIGHT. 6. MEDICATIONS    Take the following medications with a SMALL sip of water: VERAPAMIL   Use your usual dose of inhalers the morning of surgery. BRING your rescue inhaler with you to hospital.    Anesthesia does NOT want you to take insulin the morning of surgery. They will control your blood sugar while you are at the hospital. Please contact your ordering physician for instructions regarding your insulin the night before your procedure. If you have an insulin pump, please keep it set on basal rate. 7. Do not swallow water when brushing teeth. No gum, candy, mints or ice chips. Refrain from smoking or at least decrease the amount. 8. Dress in loose, comfortable clothing appropriate for redressing after your procedure. Do not wear jewelry (including body piercings), make-up (especially NO eye make-up), fingernail polish (NO toenail polish if foot/leg surgery), lotion, powders or metal hairclips. 9. Dentures, glasses, or contacts will need to be removed before your procedure. Bring cases for your glasses, contacts, dentures, or hearing aids to protect them while you are in surgery. 10. If you use a CPAP, please bring it with you on the day of your procedure. 11. We recommend that valuable personal  belongings, such as credit cards, cash, cell phones, e-tablets or jewelry, be left at home during your stay. The hospital will not be responsible for valuables that are not secured in the hospital safe. However, if your insurance requires a co-pay, you may want to bring a method of payment, i.e. Check or credit card, if you wish to pay your co-pay the day of surgery. 12. If you are to stay overnight, you may bring a bag with personal items.  Please have any

## 2018-10-09 ENCOUNTER — ANESTHESIA (OUTPATIENT)
Dept: OPERATING ROOM | Age: 68
DRG: 395 | End: 2018-10-09
Payer: MEDICARE

## 2018-10-09 ENCOUNTER — HOSPITAL ENCOUNTER (INPATIENT)
Age: 68
LOS: 1 days | Discharge: HOME | DRG: 395 | End: 2018-10-10
Attending: SURGERY | Admitting: SURGERY
Payer: MEDICARE

## 2018-10-09 VITALS
TEMPERATURE: 98.6 F | OXYGEN SATURATION: 96 % | DIASTOLIC BLOOD PRESSURE: 71 MMHG | RESPIRATION RATE: 13 BRPM | SYSTOLIC BLOOD PRESSURE: 124 MMHG

## 2018-10-09 DIAGNOSIS — D12.2 ADENOMATOUS POLYP OF ASCENDING COLON: Primary | ICD-10-CM

## 2018-10-09 PROBLEM — K62.1 RECTAL POLYP: Status: ACTIVE | Noted: 2018-10-09

## 2018-10-09 LAB
ALBUMIN SERPL-MCNC: 4.2 G/DL (ref 3.4–5)
ANION GAP SERPL CALCULATED.3IONS-SCNC: 14 MMOL/L (ref 3–16)
BUN BLDV-MCNC: 12 MG/DL (ref 7–20)
CALCIUM SERPL-MCNC: 9.3 MG/DL (ref 8.3–10.6)
CHLORIDE BLD-SCNC: 101 MMOL/L (ref 99–110)
CO2: 22 MMOL/L (ref 21–32)
CREAT SERPL-MCNC: 0.8 MG/DL (ref 0.8–1.3)
GFR AFRICAN AMERICAN: >60
GFR NON-AFRICAN AMERICAN: >60
GLUCOSE BLD-MCNC: 102 MG/DL (ref 70–99)
GLUCOSE BLD-MCNC: 181 MG/DL (ref 70–99)
PERFORMED ON: ABNORMAL
PHOSPHORUS: 2.9 MG/DL (ref 2.5–4.9)
POTASSIUM SERPL-SCNC: 4.3 MMOL/L (ref 3.5–5.1)
SODIUM BLD-SCNC: 137 MMOL/L (ref 136–145)

## 2018-10-09 PROCEDURE — 0DBK8ZZ EXCISION OF ASCENDING COLON, VIA NATURAL OR ARTIFICIAL OPENING ENDOSCOPIC: ICD-10-PCS | Performed by: SURGERY

## 2018-10-09 PROCEDURE — C9290 INJ, BUPIVACAINE LIPOSOME: HCPCS | Performed by: SURGERY

## 2018-10-09 PROCEDURE — 2580000003 HC RX 258: Performed by: ANESTHESIOLOGY

## 2018-10-09 PROCEDURE — 45378 DIAGNOSTIC COLONOSCOPY: CPT | Performed by: SURGERY

## 2018-10-09 PROCEDURE — 2500000003 HC RX 250 WO HCPCS: Performed by: SURGERY

## 2018-10-09 PROCEDURE — 80069 RENAL FUNCTION PANEL: CPT

## 2018-10-09 PROCEDURE — 3700000001 HC ADD 15 MINUTES (ANESTHESIA): Performed by: SURGERY

## 2018-10-09 PROCEDURE — 2720000010 HC SURG SUPPLY STERILE: Performed by: SURGERY

## 2018-10-09 PROCEDURE — 88341 IMHCHEM/IMCYTCHM EA ADD ANTB: CPT

## 2018-10-09 PROCEDURE — 0DBP8ZZ EXCISION OF RECTUM, VIA NATURAL OR ARTIFICIAL OPENING ENDOSCOPIC: ICD-10-PCS | Performed by: SURGERY

## 2018-10-09 PROCEDURE — 88342 IMHCHEM/IMCYTCHM 1ST ANTB: CPT

## 2018-10-09 PROCEDURE — 3700000000 HC ANESTHESIA ATTENDED CARE: Performed by: SURGERY

## 2018-10-09 PROCEDURE — 3600000004 HC SURGERY LEVEL 4 BASE: Performed by: SURGERY

## 2018-10-09 PROCEDURE — 6360000002 HC RX W HCPCS: Performed by: SURGERY

## 2018-10-09 PROCEDURE — 7100000000 HC PACU RECOVERY - FIRST 15 MIN: Performed by: SURGERY

## 2018-10-09 PROCEDURE — 2580000003 HC RX 258: Performed by: STUDENT IN AN ORGANIZED HEALTH CARE EDUCATION/TRAINING PROGRAM

## 2018-10-09 PROCEDURE — 88305 TISSUE EXAM BY PATHOLOGIST: CPT

## 2018-10-09 PROCEDURE — 2709999900 HC NON-CHARGEABLE SUPPLY: Performed by: SURGERY

## 2018-10-09 PROCEDURE — 45172 EXC RECT TUM TRANSANAL FULL: CPT | Performed by: SURGERY

## 2018-10-09 PROCEDURE — 6360000002 HC RX W HCPCS: Performed by: ANESTHESIOLOGY

## 2018-10-09 PROCEDURE — 1200000000 HC SEMI PRIVATE

## 2018-10-09 PROCEDURE — 6360000002 HC RX W HCPCS: Performed by: NURSE ANESTHETIST, CERTIFIED REGISTERED

## 2018-10-09 PROCEDURE — 36415 COLL VENOUS BLD VENIPUNCTURE: CPT

## 2018-10-09 PROCEDURE — 2580000003 HC RX 258: Performed by: SURGERY

## 2018-10-09 PROCEDURE — 7100000001 HC PACU RECOVERY - ADDTL 15 MIN: Performed by: SURGERY

## 2018-10-09 PROCEDURE — 2500000003 HC RX 250 WO HCPCS: Performed by: ANESTHESIOLOGY

## 2018-10-09 PROCEDURE — S0028 INJECTION, FAMOTIDINE, 20 MG: HCPCS | Performed by: ANESTHESIOLOGY

## 2018-10-09 PROCEDURE — 2500000003 HC RX 250 WO HCPCS: Performed by: NURSE ANESTHETIST, CERTIFIED REGISTERED

## 2018-10-09 PROCEDURE — 3600000014 HC SURGERY LEVEL 4 ADDTL 15MIN: Performed by: SURGERY

## 2018-10-09 PROCEDURE — 88307 TISSUE EXAM BY PATHOLOGIST: CPT

## 2018-10-09 RX ORDER — ONDANSETRON 2 MG/ML
INJECTION INTRAMUSCULAR; INTRAVENOUS PRN
Status: DISCONTINUED | OUTPATIENT
Start: 2018-10-09 | End: 2018-10-09 | Stop reason: SDUPTHER

## 2018-10-09 RX ORDER — EPHEDRINE SULFATE 50 MG/ML
INJECTION INTRAVENOUS PRN
Status: DISCONTINUED | OUTPATIENT
Start: 2018-10-09 | End: 2018-10-09 | Stop reason: SDUPTHER

## 2018-10-09 RX ORDER — DEXAMETHASONE SODIUM PHOSPHATE 4 MG/ML
INJECTION, SOLUTION INTRA-ARTICULAR; INTRALESIONAL; INTRAMUSCULAR; INTRAVENOUS; SOFT TISSUE PRN
Status: DISCONTINUED | OUTPATIENT
Start: 2018-10-09 | End: 2018-10-09 | Stop reason: SDUPTHER

## 2018-10-09 RX ORDER — LIDOCAINE HYDROCHLORIDE 20 MG/ML
INJECTION, SOLUTION INFILTRATION; PERINEURAL PRN
Status: DISCONTINUED | OUTPATIENT
Start: 2018-10-09 | End: 2018-10-09 | Stop reason: SDUPTHER

## 2018-10-09 RX ORDER — FENTANYL CITRATE 50 UG/ML
INJECTION, SOLUTION INTRAMUSCULAR; INTRAVENOUS PRN
Status: DISCONTINUED | OUTPATIENT
Start: 2018-10-09 | End: 2018-10-09 | Stop reason: SDUPTHER

## 2018-10-09 RX ORDER — PROMETHAZINE HYDROCHLORIDE 25 MG/ML
6.25 INJECTION, SOLUTION INTRAMUSCULAR; INTRAVENOUS
Status: DISCONTINUED | OUTPATIENT
Start: 2018-10-09 | End: 2018-10-09

## 2018-10-09 RX ORDER — SODIUM CHLORIDE 0.9 % (FLUSH) 0.9 %
10 SYRINGE (ML) INJECTION EVERY 12 HOURS SCHEDULED
Status: DISCONTINUED | OUTPATIENT
Start: 2018-10-09 | End: 2018-10-10 | Stop reason: HOSPADM

## 2018-10-09 RX ORDER — PROPOFOL 10 MG/ML
INJECTION, EMULSION INTRAVENOUS PRN
Status: DISCONTINUED | OUTPATIENT
Start: 2018-10-09 | End: 2018-10-09 | Stop reason: SDUPTHER

## 2018-10-09 RX ORDER — SODIUM CHLORIDE, SODIUM LACTATE, POTASSIUM CHLORIDE, CALCIUM CHLORIDE 600; 310; 30; 20 MG/100ML; MG/100ML; MG/100ML; MG/100ML
INJECTION, SOLUTION INTRAVENOUS CONTINUOUS
Status: DISCONTINUED | OUTPATIENT
Start: 2018-10-09 | End: 2018-10-09

## 2018-10-09 RX ORDER — MAGNESIUM HYDROXIDE 1200 MG/15ML
LIQUID ORAL CONTINUOUS PRN
Status: COMPLETED | OUTPATIENT
Start: 2018-10-09 | End: 2018-10-09

## 2018-10-09 RX ORDER — NEOSTIGMINE METHYLSULFATE 5 MG/5 ML
SYRINGE (ML) INTRAVENOUS PRN
Status: DISCONTINUED | OUTPATIENT
Start: 2018-10-09 | End: 2018-10-09 | Stop reason: SDUPTHER

## 2018-10-09 RX ORDER — SODIUM CHLORIDE 0.9 % (FLUSH) 0.9 %
10 SYRINGE (ML) INJECTION EVERY 12 HOURS SCHEDULED
Status: DISCONTINUED | OUTPATIENT
Start: 2018-10-09 | End: 2018-10-09

## 2018-10-09 RX ORDER — ONDANSETRON 2 MG/ML
4 INJECTION INTRAMUSCULAR; INTRAVENOUS EVERY 6 HOURS PRN
Status: DISCONTINUED | OUTPATIENT
Start: 2018-10-09 | End: 2018-10-10 | Stop reason: HOSPADM

## 2018-10-09 RX ORDER — OXYCODONE HYDROCHLORIDE AND ACETAMINOPHEN 5; 325 MG/1; MG/1
1 TABLET ORAL EVERY 4 HOURS PRN
Status: DISCONTINUED | OUTPATIENT
Start: 2018-10-09 | End: 2018-10-10 | Stop reason: HOSPADM

## 2018-10-09 RX ORDER — ONDANSETRON 2 MG/ML
4 INJECTION INTRAMUSCULAR; INTRAVENOUS
Status: DISCONTINUED | OUTPATIENT
Start: 2018-10-09 | End: 2018-10-09

## 2018-10-09 RX ORDER — BUPIVACAINE HYDROCHLORIDE 5 MG/ML
INJECTION, SOLUTION EPIDURAL; INTRACAUDAL PRN
Status: DISCONTINUED | OUTPATIENT
Start: 2018-10-09 | End: 2018-10-09 | Stop reason: HOSPADM

## 2018-10-09 RX ORDER — GLYCOPYRROLATE 1 MG/5 ML
SYRINGE (ML) INTRAVENOUS PRN
Status: DISCONTINUED | OUTPATIENT
Start: 2018-10-09 | End: 2018-10-09 | Stop reason: SDUPTHER

## 2018-10-09 RX ORDER — LIDOCAINE HYDROCHLORIDE 10 MG/ML
1 INJECTION, SOLUTION EPIDURAL; INFILTRATION; INTRACAUDAL; PERINEURAL
Status: DISCONTINUED | OUTPATIENT
Start: 2018-10-09 | End: 2018-10-09

## 2018-10-09 RX ORDER — DIPHENHYDRAMINE HYDROCHLORIDE 50 MG/ML
25 INJECTION INTRAMUSCULAR; INTRAVENOUS ONCE
Status: COMPLETED | OUTPATIENT
Start: 2018-10-09 | End: 2018-10-09

## 2018-10-09 RX ORDER — SODIUM CHLORIDE 0.9 % (FLUSH) 0.9 %
10 SYRINGE (ML) INJECTION PRN
Status: DISCONTINUED | OUTPATIENT
Start: 2018-10-09 | End: 2018-10-10 | Stop reason: HOSPADM

## 2018-10-09 RX ORDER — CIPROFLOXACIN 2 MG/ML
400 INJECTION, SOLUTION INTRAVENOUS
Status: DISCONTINUED | OUTPATIENT
Start: 2018-10-09 | End: 2018-10-09

## 2018-10-09 RX ORDER — FENTANYL CITRATE 50 UG/ML
25 INJECTION, SOLUTION INTRAMUSCULAR; INTRAVENOUS EVERY 5 MIN PRN
Status: DISCONTINUED | OUTPATIENT
Start: 2018-10-09 | End: 2018-10-09

## 2018-10-09 RX ORDER — FENTANYL CITRATE 50 UG/ML
50 INJECTION, SOLUTION INTRAMUSCULAR; INTRAVENOUS EVERY 5 MIN PRN
Status: DISCONTINUED | OUTPATIENT
Start: 2018-10-09 | End: 2018-10-09

## 2018-10-09 RX ORDER — SODIUM CHLORIDE 0.9 % (FLUSH) 0.9 %
10 SYRINGE (ML) INJECTION PRN
Status: DISCONTINUED | OUTPATIENT
Start: 2018-10-09 | End: 2018-10-09

## 2018-10-09 RX ORDER — ROCURONIUM BROMIDE 10 MG/ML
INJECTION, SOLUTION INTRAVENOUS PRN
Status: DISCONTINUED | OUTPATIENT
Start: 2018-10-09 | End: 2018-10-09 | Stop reason: SDUPTHER

## 2018-10-09 RX ORDER — VERAPAMIL HYDROCHLORIDE 240 MG/1
240 TABLET, FILM COATED, EXTENDED RELEASE ORAL 2 TIMES DAILY
Status: DISCONTINUED | OUTPATIENT
Start: 2018-10-10 | End: 2018-10-10 | Stop reason: HOSPADM

## 2018-10-09 RX ADMIN — ONDANSETRON 4 MG: 2 INJECTION INTRAMUSCULAR; INTRAVENOUS at 09:23

## 2018-10-09 RX ADMIN — SODIUM CHLORIDE, SODIUM LACTATE, POTASSIUM CHLORIDE, AND CALCIUM CHLORIDE: 600; 310; 30; 20 INJECTION, SOLUTION INTRAVENOUS at 07:36

## 2018-10-09 RX ADMIN — FENTANYL CITRATE 50 MCG: 50 INJECTION INTRAMUSCULAR; INTRAVENOUS at 10:00

## 2018-10-09 RX ADMIN — Medication 5 MG: at 10:48

## 2018-10-09 RX ADMIN — Medication 0.8 MG: at 10:48

## 2018-10-09 RX ADMIN — METRONIDAZOLE 500 MG: 500 INJECTION, SOLUTION INTRAVENOUS at 09:10

## 2018-10-09 RX ADMIN — FAMOTIDINE 20 MG: 10 INJECTION, SOLUTION INTRAVENOUS at 07:42

## 2018-10-09 RX ADMIN — LIDOCAINE HYDROCHLORIDE 40 MG: 20 INJECTION, SOLUTION INFILTRATION; PERINEURAL at 09:02

## 2018-10-09 RX ADMIN — DEXAMETHASONE SODIUM PHOSPHATE 4 MG: 4 INJECTION, SOLUTION INTRAMUSCULAR; INTRAVENOUS at 09:23

## 2018-10-09 RX ADMIN — EPHEDRINE SULFATE 10 MG: 50 INJECTION INTRAVENOUS at 09:43

## 2018-10-09 RX ADMIN — FENTANYL CITRATE 100 MCG: 50 INJECTION INTRAMUSCULAR; INTRAVENOUS at 09:02

## 2018-10-09 RX ADMIN — ROCURONIUM BROMIDE 50 MG: 10 INJECTION, SOLUTION INTRAVENOUS at 09:02

## 2018-10-09 RX ADMIN — DIPHENHYDRAMINE HYDROCHLORIDE 25 MG: 50 INJECTION INTRAMUSCULAR; INTRAVENOUS at 08:20

## 2018-10-09 RX ADMIN — HYDROCORTISONE SODIUM SUCCINATE 100 MG: 100 INJECTION, POWDER, FOR SOLUTION INTRAMUSCULAR; INTRAVENOUS at 08:22

## 2018-10-09 RX ADMIN — SODIUM CHLORIDE, SODIUM LACTATE, POTASSIUM CHLORIDE, AND CALCIUM CHLORIDE: 600; 310; 30; 20 INJECTION, SOLUTION INTRAVENOUS at 09:50

## 2018-10-09 RX ADMIN — Medication 10 ML: at 21:00

## 2018-10-09 RX ADMIN — FENTANYL CITRATE 50 MCG: 50 INJECTION INTRAMUSCULAR; INTRAVENOUS at 10:38

## 2018-10-09 RX ADMIN — ROCURONIUM BROMIDE 10 MG: 10 INJECTION, SOLUTION INTRAVENOUS at 10:02

## 2018-10-09 RX ADMIN — EPHEDRINE SULFATE 5 MG: 50 INJECTION INTRAVENOUS at 09:52

## 2018-10-09 RX ADMIN — PROPOFOL 200 MG: 10 INJECTION, EMULSION INTRAVENOUS at 09:02

## 2018-10-09 ASSESSMENT — PULMONARY FUNCTION TESTS
PIF_VALUE: 3
PIF_VALUE: 23
PIF_VALUE: 1
PIF_VALUE: 21
PIF_VALUE: 23
PIF_VALUE: 1
PIF_VALUE: 23
PIF_VALUE: 21
PIF_VALUE: 23
PIF_VALUE: 21
PIF_VALUE: 1
PIF_VALUE: 23
PIF_VALUE: 2
PIF_VALUE: 23
PIF_VALUE: 20
PIF_VALUE: 20
PIF_VALUE: 24
PIF_VALUE: 23
PIF_VALUE: 21
PIF_VALUE: 14
PIF_VALUE: 21
PIF_VALUE: 14
PIF_VALUE: 9
PIF_VALUE: 21
PIF_VALUE: 21
PIF_VALUE: 23
PIF_VALUE: 22
PIF_VALUE: 23
PIF_VALUE: 21
PIF_VALUE: 20
PIF_VALUE: 23
PIF_VALUE: 23
PIF_VALUE: 21
PIF_VALUE: 23
PIF_VALUE: 23
PIF_VALUE: 21
PIF_VALUE: 23
PIF_VALUE: 21
PIF_VALUE: 23
PIF_VALUE: 14
PIF_VALUE: 23
PIF_VALUE: 12
PIF_VALUE: 24
PIF_VALUE: 23
PIF_VALUE: 21
PIF_VALUE: 23
PIF_VALUE: 21
PIF_VALUE: 21
PIF_VALUE: 23
PIF_VALUE: 22
PIF_VALUE: 20
PIF_VALUE: 21
PIF_VALUE: 21
PIF_VALUE: 24
PIF_VALUE: 23
PIF_VALUE: 21
PIF_VALUE: 13
PIF_VALUE: 24
PIF_VALUE: 24
PIF_VALUE: 21
PIF_VALUE: 2
PIF_VALUE: 23
PIF_VALUE: 23
PIF_VALUE: 21
PIF_VALUE: 22
PIF_VALUE: 1
PIF_VALUE: 24
PIF_VALUE: 23
PIF_VALUE: 21
PIF_VALUE: 34
PIF_VALUE: 22
PIF_VALUE: 0
PIF_VALUE: 21
PIF_VALUE: 23
PIF_VALUE: 23
PIF_VALUE: 22
PIF_VALUE: 0
PIF_VALUE: 21
PIF_VALUE: 23
PIF_VALUE: 22
PIF_VALUE: 23
PIF_VALUE: 21
PIF_VALUE: 24
PIF_VALUE: 23
PIF_VALUE: 21
PIF_VALUE: 23
PIF_VALUE: 21
PIF_VALUE: 24
PIF_VALUE: 21
PIF_VALUE: 23
PIF_VALUE: 23
PIF_VALUE: 24
PIF_VALUE: 23
PIF_VALUE: 23
PIF_VALUE: 21
PIF_VALUE: 27
PIF_VALUE: 15
PIF_VALUE: 23

## 2018-10-09 ASSESSMENT — PAIN SCALES - GENERAL
PAINLEVEL_OUTOF10: 0

## 2018-10-09 ASSESSMENT — PAIN - FUNCTIONAL ASSESSMENT: PAIN_FUNCTIONAL_ASSESSMENT: 0-10

## 2018-10-09 ASSESSMENT — LIFESTYLE VARIABLES: SMOKING_STATUS: 0

## 2018-10-09 NOTE — OP NOTE
Operative Note     Xavier Doss  YOB: 1950  [id number]     Pre-operative Diagnosis: Adenomatous rectal polyp     Post-operative Diagnosis: Same     Procedure: Colonoscopy with cold forcep polypectomy; Transanal excision of rectal mass via TAMIS (transanal minimally invasive surgery)     Anesthesia: General     Surgeons/Assistants: Navneet Younger, staff MD; Fátima William PGY5     Estimated Blood Loss: 5cc     Complications: None     Specimens: Was Obtained: colon polyp, rectal polyp     Findings: see full note      Indications: Rectal mass that could not be removed during colonoscopy    OPERATIVE NOTE    After informed consent was obtained the patient was taken to the operating room. General anesthesia was given. The patient was placed in the prone position with appropriate padding. Care was taken to make sure his gentials were protected. We then performed colonoscopy    We performed colonoscopy complete to the cecum. The exam was not difficult. The prep was good. Cecum and appendiceal orifice were seen and photographed. Withdrawal time was approximately 6 minutes. A single small sessile polyp of the ascending colon was removed by cold forceps. We could not locate any additional polyps in that area. Resection and retrieval were complete. The rectal polyp was also seen and looked amenable to TAMIS. The patient was prepped and draped in the usual sterile fashion. We then placed the TAMIS platform and identified the polyp. It appeared mobile and amenable to transanal excision. It was about 2 cm in size and on a fold. It was around 10-12 cm from the verge. The polyp was in the right posterior position. We then marked the edges of the polyp using cautery with a small margin of healthy rectum. The polyp was removed down to perirectal fat using a combination of Ligasure and the hook bovie. Good hemostasis was seen. Specimen was sent pathology. No residual polyp was seen at cut edge.     We closed the

## 2018-10-09 NOTE — H&P
David Garzon    [id number]      Department of General Surgery    Surgical Services     Pre-operative History and Physical      INDICATION:   RECTAL BLEEDING    PROCEDURE:  MN SURG EXCISION OF ANAL LESION(S) [65216] (TRANSANAL EXCISION OF RECTAL POLYP, POSSIBLE TRANSANAL MINIMALLY INVASIVE SURGERY (TAMIS);)  MN COLONOSCOPY FLX DX W/COLLJ SPEC WHEN PFRMD [82150] (COLONOSCOPY)    CHIEF COMPLAINT:  No chief complaint on file. Patient Active Problem List   Diagnosis    Idiopathic urticaria    Essential hypertension    ED (erectile dysfunction)    Hyperlipidemia    Low testosterone    Hyperglycemia    Obesity (BMI 30-39. 9)    Arthritis    Primary osteoarthritis of left hip    Osteoarthritis of left hip    Status post total hip replacement, left    Rectal bleeding    Coronary artery disease due to lipid rich plaque    Chronic congestive heart failure (HCC)    ECG abnormal    Rectal mass       HISTORY: Patient is a 77year old male with a significant medical history (see below). He complains of rectal bleeding and some discomfort. He is scheduled today for Transanal Excision Of Rectal Polyp, Possible Transanal Minimally Invasive Surgery (TAMIS);-NA Colonoscopy-NA by Dr Mame Ivory.       Weight loss:  no  Hx Steroid use: no    Past Medical History:        Diagnosis Date    Angioedema     ED (erectile dysfunction) 12/2/2010    Idiopathic urticaria     MI (myocardial infarction) ([facility] [address]     10/22/2017    Osteoarthritis     Tear of medial cartilage or meniscus of knee, current     Unspecified essential hypertension      Past Surgical History:        Procedure Laterality Date    APPENDECTOMY      CARDIAC SURGERY      cardiac stents x2    COLONOSCOPY N/A 7/25/2018    COLONOSCOPY WITH BIOPSY OF RECTAL MASS [place] [facility] [place] [facility] [place]  2017    HIP SURGERY      JOINT REPLACEMENT      OTHER SURGICAL HISTORY  06/12/2018    LEFT ANTERIOR HIP

## 2018-10-09 NOTE — PROGRESS NOTES
Department of Surgery:  Post-op Note    CC: adenomatous rectal polyp    Procedure(s) Performed: colonoscopy with cold forcep polypectomy, transanal excision of rectal mass via TAMIS    Subjective:     Patient is very pleased with his surgery. He says that he has been in no pain. He has voided multiple times. He is looking forward to eating, no nausea or vomiting.      Objective:  Anesthesia type: General      I/O    Intra op    Post op     Fluids  1000 mL 0 mL     EBL 5cc mL 0 mL     Urine 0 mL 450 mL     Exam:  Vitals:    10/09/18 1200 10/09/18 1215 10/09/18 1257 10/09/18 1525   BP: 137/67 131/68 135/80 (!) 149/78   Pulse: 85 83 86 81   Resp: 16 16 16 16   Temp:  97.1 °F (36.2 °C) 97.3 °F (36.3 °C) 97.5 °F (36.4 °C)   TempSrc:  Temporal  Oral   SpO2: 93% 91% 93% 92%   Weight:       Height:           General appearance: alert, no acute distress, grooming appropriate  Chest/Lungs: CTAB, no crackles/rales, wheezes/rhonchi, normal effort  Cardiovascular: RRR, no murmurs/gallops/rubs  Abdomen: soft, appropriately tender, non-distended  Anus: dressings are clean, dry, and intact  Extremities: no edema, no cyanosis  Neuro: A&Ox3, no focal deficits, sensation intact    Assessment and Plan  This is a 77y.o. year old male s/p colonoscopy with cold forcep polypectomy, transanal excision of rectal mass via TAMIS POD #0    Pain management: Percocet  Cardiovascular: Hemodynamically stable  Respiratory: extubated, encourage hourly incentive spirometry and deep breathing  FEN:  Fluids: none, Diet: cardiac  : Urine output is adequate  Wound: local care  Ambulation: OOB to chair, encourage ambulation  Prophylaxis: aishwarya Valentino MD PGY-1  General Surgery Resident  10/09/18  5:18 PM  [phone]

## 2018-10-09 NOTE — PROGRESS NOTES
Patient up from pacu. Patient oriented to room, controls, diet, nausea and pain meds. Patient verblized understanding. Patient given coffee per his request.  Called fans for patient to place food order. Bed alarm on as patient has had anesthesia. Wife at bedside. No further needs verbalized when asked. Will monitor.

## 2018-10-09 NOTE — PROGRESS NOTES
Patient admitted to [facility] . Post op   TRANSANAL EXCISION OF RECTAL POLYP, TRANSANAL MINIMALLY INVASIVE SURGERY (TAMIS); EXCISION RECTAL MASS (N/A )      COLONOSCOPY WITH COLD FORCEP POLYPECTOMY (N/A ) Diagnosis: (RECTAL BLEEDING)   Surgeon: Lucio Kaplan MD Responsible Provider: Herbie Gomez     Report received from anesthesia personnel- no problems noted during the case. Patient drowsy- no complaints of pain. VS stable. Patient with gauze fluff to buttocks and mesh underpants intact- no signs of discomfort noted. Good peripheral pulses present.

## 2018-10-09 NOTE — PROGRESS NOTES
Patient up to the bathroom once but  Not able to void. Patient up to the bathroom again and placed a hat in toilet so he doesn't have to worry about holding a urinal.  Will monitor.

## 2018-10-09 NOTE — ANESTHESIA PRE PROCEDURE
History:    Social History   Substance Use Topics    Smoking status: Former Smoker     Types: Cigars     Quit date: 2/6/2014    Smokeless tobacco: Never Used    Alcohol use No                                Counseling given: Not Answered      Vital Signs (Current):   Vitals:    10/08/18 1416   Weight: 200 lb (90.7 kg)   Height: 5' 11\" (1.803 m)                                              BP Readings from Last 3 Encounters:   08/16/18 135/74   07/30/18 136/80   07/27/18 137/65       NPO Status:                                                                                 BMI:   Wt Readings from Last 3 Encounters:   10/08/18 200 lb (90.7 kg)   07/30/18 202 lb (91.6 kg)   07/26/18 199 lb 6.4 oz (90.4 kg)     Body mass index is 27.89 kg/m². CBC:   Lab Results   Component Value Date    WBC 8.3 07/27/2018    RBC 4.53 07/27/2018    HGB 12.9 07/27/2018    HCT 39.2 07/27/2018    MCV 86.5 07/27/2018    RDW 13.4 07/27/2018     07/27/2018       CMP:   Lab Results   Component Value Date     07/27/2018    K 4.2 07/27/2018     07/27/2018    CO2 24 07/27/2018    BUN 10 07/27/2018    CREATININE 0.7 07/27/2018    GFRAA >60 07/27/2018    GFRAA >60 02/15/2013    AGRATIO 1.8 04/19/2017    LABGLOM >60 07/27/2018    GLUCOSE 121 07/27/2018    PROT 7.5 04/19/2017    PROT 7.3 02/15/2013    CALCIUM 9.6 07/27/2018    BILITOT 0.7 04/19/2017    ALKPHOS 92 04/19/2017    AST 18 04/19/2017    ALT 30 04/19/2017       POC Tests: No results for input(s): POCGLU, POCNA, POCK, POCCL, POCBUN, POCHEMO, POCHCT in the last 72 hours.     Coags:   Lab Results   Component Value Date    PROTIME 12.3 07/24/2018    INR 1.08 07/24/2018    APTT 33.0 05/14/2018       HCG (If Applicable): No results found for: PREGTESTUR, PREGSERUM, HCG, HCGQUANT     ABGs: No results found for: PHART, PO2ART, PWD9SUH, YSJ3BKS, BEART, A4PGEBWN     Type & Screen (If Applicable):  No results found for: TAMIA [facility]    Anesthesia Evaluation  Patient summary

## 2018-10-10 VITALS
HEIGHT: 71 IN | DIASTOLIC BLOOD PRESSURE: 79 MMHG | BODY MASS INDEX: 28 KG/M2 | SYSTOLIC BLOOD PRESSURE: 166 MMHG | HEART RATE: 79 BPM | WEIGHT: 200 LBS | OXYGEN SATURATION: 93 % | TEMPERATURE: 98.3 F | RESPIRATION RATE: 16 BRPM

## 2018-10-10 LAB
ALBUMIN SERPL-MCNC: 4.4 G/DL (ref 3.4–5)
ANION GAP SERPL CALCULATED.3IONS-SCNC: 13 MMOL/L (ref 3–16)
BASOPHILS ABSOLUTE: 0 K/UL (ref 0–0.2)
BASOPHILS RELATIVE PERCENT: 0.4 %
BUN BLDV-MCNC: 14 MG/DL (ref 7–20)
CALCIUM SERPL-MCNC: 9.4 MG/DL (ref 8.3–10.6)
CHLORIDE BLD-SCNC: 107 MMOL/L (ref 99–110)
CO2: 24 MMOL/L (ref 21–32)
CREAT SERPL-MCNC: 0.8 MG/DL (ref 0.8–1.3)
EOSINOPHILS ABSOLUTE: 0 K/UL (ref 0–0.6)
EOSINOPHILS RELATIVE PERCENT: 0.1 %
GFR AFRICAN AMERICAN: >60
GFR NON-AFRICAN AMERICAN: >60
GLUCOSE BLD-MCNC: 121 MG/DL (ref 70–99)
HCT VFR BLD CALC: 43.1 % (ref 40.5–52.5)
HEMOGLOBIN: 14.1 G/DL (ref 13.5–17.5)
LYMPHOCYTES ABSOLUTE: 1.9 K/UL (ref 1–5.1)
LYMPHOCYTES RELATIVE PERCENT: 14.7 %
MAGNESIUM: 2 MG/DL (ref 1.8–2.4)
MCH RBC QN AUTO: 27 PG (ref 26–34)
MCHC RBC AUTO-ENTMCNC: 32.6 G/DL (ref 31–36)
MCV RBC AUTO: 82.7 FL (ref 80–100)
MONOCYTES ABSOLUTE: 1.2 K/UL (ref 0–1.3)
MONOCYTES RELATIVE PERCENT: 9.2 %
NEUTROPHILS ABSOLUTE: 9.9 K/UL (ref 1.7–7.7)
NEUTROPHILS RELATIVE PERCENT: 75.6 %
PDW BLD-RTO: 14.7 % (ref 12.4–15.4)
PHOSPHORUS: 3.4 MG/DL (ref 2.5–4.9)
PLATELET # BLD: 246 K/UL (ref 135–450)
PMV BLD AUTO: 9 FL (ref 5–10.5)
POTASSIUM SERPL-SCNC: 4.3 MMOL/L (ref 3.5–5.1)
RBC # BLD: 5.21 M/UL (ref 4.2–5.9)
SODIUM BLD-SCNC: 144 MMOL/L (ref 136–145)
WBC # BLD: 13.1 K/UL (ref 4–11)

## 2018-10-10 PROCEDURE — 80069 RENAL FUNCTION PANEL: CPT

## 2018-10-10 PROCEDURE — 6360000002 HC RX W HCPCS: Performed by: STUDENT IN AN ORGANIZED HEALTH CARE EDUCATION/TRAINING PROGRAM

## 2018-10-10 PROCEDURE — 83735 ASSAY OF MAGNESIUM: CPT

## 2018-10-10 PROCEDURE — 2580000003 HC RX 258: Performed by: STUDENT IN AN ORGANIZED HEALTH CARE EDUCATION/TRAINING PROGRAM

## 2018-10-10 PROCEDURE — 6370000000 HC RX 637 (ALT 250 FOR IP): Performed by: STUDENT IN AN ORGANIZED HEALTH CARE EDUCATION/TRAINING PROGRAM

## 2018-10-10 PROCEDURE — 85025 COMPLETE CBC W/AUTO DIFF WBC: CPT

## 2018-10-10 PROCEDURE — 36415 COLL VENOUS BLD VENIPUNCTURE: CPT

## 2018-10-10 RX ORDER — DOCUSATE SODIUM 100 MG/1
100 CAPSULE, LIQUID FILLED ORAL 2 TIMES DAILY
Qty: 14 CAPSULE | Refills: 0 | Status: SHIPPED | OUTPATIENT
Start: 2018-10-10 | End: 2018-10-17

## 2018-10-10 RX ORDER — OXYCODONE HYDROCHLORIDE AND ACETAMINOPHEN 5; 325 MG/1; MG/1
1 TABLET ORAL EVERY 6 HOURS PRN
Qty: 15 TABLET | Refills: 0 | Status: SHIPPED | OUTPATIENT
Start: 2018-10-10 | End: 2018-10-17

## 2018-10-10 RX ADMIN — VERAPAMIL HYDROCHLORIDE 240 MG: 240 TABLET, FILM COATED, EXTENDED RELEASE ORAL at 08:53

## 2018-10-10 RX ADMIN — Medication 10 ML: at 08:54

## 2018-10-10 RX ADMIN — ENOXAPARIN SODIUM 40 MG: 40 INJECTION SUBCUTANEOUS at 08:53

## 2018-10-10 ASSESSMENT — PAIN SCALES - GENERAL
PAINLEVEL_OUTOF10: 0
PAINLEVEL_OUTOF10: 0

## 2018-10-10 NOTE — PROGRESS NOTES
DVT PPx: Lovenox and SCDs  - Pulmonary toilet: IS bedside, encourage frequent use  - Activity: encourage frequent ambulation and out of bed to chair    Arpit Hicks MD PGY-1  General Surgery Resident  10/10/18  6:17 AM  [phone]

## 2018-10-11 ENCOUNTER — CARE COORDINATION (OUTPATIENT)
Dept: CASE MANAGEMENT | Age: 68
End: 2018-10-11

## 2018-10-11 DIAGNOSIS — K62.89 RECTAL MASS: Primary | ICD-10-CM

## 2018-10-11 NOTE — DISCHARGE SUMMARY
Discharge Summary      Patient:  Bing Teixeira Date: 10/9/2018  6:46 AM    Discharge Date: 10/10/2018    Admitting Physician: Harry Valerio MD     Discharge Physician: same    Admitting Diagnosis:  Adenomatous rectal polyp    Discharge Diagnosis: same     Past Medical History:   Diagnosis Date    Angioedema     ED (erectile dysfunction) 12/2/2010    Idiopathic urticaria     MI (myocardial infarction) ([facility] [address]     10/22/2017    Osteoarthritis     Tear of medial cartilage or meniscus of knee, current     Unspecified essential hypertension         Indication for Admission:   Patient is a 77year old male complaining of rectal bleeding and some discomfort. He was scheduled for Transanal Excision Of Rectal Polyp    Hospital Course:   Patient underwent colonoscopy with cold forcep polypectomy and Transanal excision of rectal mass via TAMIS (transanal minimally invasive surgery) without complication on hospital day 1. He had no pain in the post-operative period, was voiding, and tolerating diet. Patient had no overnight events, and was discharged on POD1 after he continued to have pain well controlled and tolerate diet. He was given follow-up instructions.      Procedures:  Colonoscopy with cold forcep polypectomy; Transanal excision of rectal mass via TAMIS (transanal minimally invasive surgery)    Consulting services:  none    Discharge physical exam:     General appearance: alert, no acute distress, grooming appropriate  Chest/Lungs: CTAB, no crackles/rales, wheezes/rhonchi, normal effort  Cardiovascular: RRR, no murmurs/gallops/rubs  Abdomen: soft, appropriately tender, non-distended  Anus: dressings are clean, dry, and intact  Extremities: no edema, no cyanosis  Neuro: A&Ox3, no focal deficits, sensation intact    Disposition:  home    Condition at discharge:  Stable    Discharge Instructions:  See separate form    Patient Instructions:      Medication List      START taking these medications

## 2018-10-15 ENCOUNTER — TELEPHONE (OUTPATIENT)
Dept: SURGERY | Age: 68
End: 2018-10-15

## 2018-10-16 RX ORDER — METRONIDAZOLE 250 MG/1
250 TABLET ORAL 2 TIMES DAILY
Qty: 6 TABLET | Refills: 0 | Status: SHIPPED | OUTPATIENT
Start: 2018-10-16 | End: 2018-10-19

## 2018-10-16 NOTE — TELEPHONE ENCOUNTER
6/12/18 LT ANT THR REKHA      Patient has a dental appt coming up. Said he can not take very many antibiotics (said he has angiodema)  One of the few antibiotics he can take is Flagyl.   Is this ok to call in?

## 2018-10-19 ENCOUNTER — CARE COORDINATION (OUTPATIENT)
Dept: CASE MANAGEMENT | Age: 68
End: 2018-10-19

## 2018-10-24 ENCOUNTER — OFFICE VISIT (OUTPATIENT)
Dept: SURGERY | Age: 68
End: 2018-10-24

## 2018-10-24 ENCOUNTER — CARE COORDINATION (OUTPATIENT)
Dept: CASE MANAGEMENT | Age: 68
End: 2018-10-24

## 2018-10-24 VITALS
SYSTOLIC BLOOD PRESSURE: 110 MMHG | BODY MASS INDEX: 28.84 KG/M2 | HEIGHT: 71 IN | DIASTOLIC BLOOD PRESSURE: 62 MMHG | RESPIRATION RATE: 16 BRPM | WEIGHT: 206 LBS

## 2018-10-24 DIAGNOSIS — C20 RECTAL CANCER (HCC): Primary | ICD-10-CM

## 2018-10-24 PROCEDURE — 99024 POSTOP FOLLOW-UP VISIT: CPT | Performed by: SURGERY

## 2018-11-01 ENCOUNTER — OFFICE VISIT (OUTPATIENT)
Dept: INTERNAL MEDICINE CLINIC | Age: 68
End: 2018-11-01
Payer: MEDICARE

## 2018-11-01 VITALS
HEIGHT: 71 IN | DIASTOLIC BLOOD PRESSURE: 60 MMHG | SYSTOLIC BLOOD PRESSURE: 120 MMHG | WEIGHT: 209 LBS | BODY MASS INDEX: 29.26 KG/M2

## 2018-11-01 DIAGNOSIS — I10 ESSENTIAL HYPERTENSION: Primary | ICD-10-CM

## 2018-11-01 DIAGNOSIS — I25.83 CORONARY ARTERY DISEASE DUE TO LIPID RICH PLAQUE: ICD-10-CM

## 2018-11-01 DIAGNOSIS — I25.10 CORONARY ARTERY DISEASE DUE TO LIPID RICH PLAQUE: ICD-10-CM

## 2018-11-01 DIAGNOSIS — M16.12 PRIMARY OSTEOARTHRITIS OF LEFT HIP: ICD-10-CM

## 2018-11-01 DIAGNOSIS — M19.90 ARTHRITIS: ICD-10-CM

## 2018-11-01 DIAGNOSIS — E78.49 OTHER HYPERLIPIDEMIA: ICD-10-CM

## 2018-11-01 PROCEDURE — 1101F PT FALLS ASSESS-DOCD LE1/YR: CPT | Performed by: INTERNAL MEDICINE

## 2018-11-01 PROCEDURE — G8484 FLU IMMUNIZE NO ADMIN: HCPCS | Performed by: INTERNAL MEDICINE

## 2018-11-01 PROCEDURE — 99214 OFFICE O/P EST MOD 30 MIN: CPT | Performed by: INTERNAL MEDICINE

## 2018-11-01 PROCEDURE — G8598 ASA/ANTIPLAT THER USED: HCPCS | Performed by: INTERNAL MEDICINE

## 2018-11-01 PROCEDURE — 4040F PNEUMOC VAC/ADMIN/RCVD: CPT | Performed by: INTERNAL MEDICINE

## 2018-11-01 PROCEDURE — 1111F DSCHRG MED/CURRENT MED MERGE: CPT | Performed by: INTERNAL MEDICINE

## 2018-11-01 PROCEDURE — G8417 CALC BMI ABV UP PARAM F/U: HCPCS | Performed by: INTERNAL MEDICINE

## 2018-11-01 PROCEDURE — G8510 SCR DEP NEG, NO PLAN REQD: HCPCS | Performed by: INTERNAL MEDICINE

## 2018-11-01 PROCEDURE — 1036F TOBACCO NON-USER: CPT | Performed by: INTERNAL MEDICINE

## 2018-11-01 PROCEDURE — 1123F ACP DISCUSS/DSCN MKR DOCD: CPT | Performed by: INTERNAL MEDICINE

## 2018-11-01 PROCEDURE — 3017F COLORECTAL CA SCREEN DOC REV: CPT | Performed by: INTERNAL MEDICINE

## 2018-11-01 PROCEDURE — G8427 DOCREV CUR MEDS BY ELIG CLIN: HCPCS | Performed by: INTERNAL MEDICINE

## 2018-11-01 ASSESSMENT — PATIENT HEALTH QUESTIONNAIRE - PHQ9
2. FEELING DOWN, DEPRESSED OR HOPELESS: 0
SUM OF ALL RESPONSES TO PHQ QUESTIONS 1-9: 0
SUM OF ALL RESPONSES TO PHQ QUESTIONS 1-9: 0
SUM OF ALL RESPONSES TO PHQ9 QUESTIONS 1 & 2: 0
1. LITTLE INTEREST OR PLEASURE IN DOING THINGS: 0

## 2018-11-01 NOTE — PROGRESS NOTES
advised that he is unable to take any type of ABX, chronic hives.  Sulfa Antibiotics     Atorvastatin Hives and Rash     May 2017: broke out in hives around groin and developed yeast infection       REVIEW OF SYSTEMS:      CONSTITUTIONAL: No major weight gain or loss, fatigue, weakness, night sweats or fever. HEENT: No new vision difficulties . No congestion or sore throat. RESPIRATORY: No new SOB, PND, orthopnea or cough. CARDIOVASCULAR: No chest pain or palpitations. GI: No nausea, vomiting, diarrhea, constipation, abdominal pain or changes in bowel habits. : No urinary frequency, urgency, incontinence hematuria or dysuria. SKIN: No cyanosis or skin lesions. MUSCULOSKELETAL: No new muscle pain. Chronic osteoarthritis especially of left knee  NEUROLOGICAL: No syncope or TIA-like symptoms. No change in sensation or strength. PSYCHIATRIC: No anxiety, insomnia or depression    Objective:   PHYSICAL EXAM:        VITALS:  /60   Ht 5' 11\" (1.803 m)   Wt 209 lb (94.8 kg)   BMI 29.15 kg/m²   CONSTITUTIONAL: Cooperative, no apparent distress, and appears well nourished / developed  NEUROLOGIC:  Awake and orientated to person, place and time. Sensation normal Motor normal.  PSYCH: Calm affect. SKIN: Warm and dry. HEENT: Sclera non-icteric, normocephalic. EOMI MICHELLE  Neck: Supple, no elevation of JVP, normal carotid pulses with no bruits and thyroid normal size. LUNGS:  No increased work of breathing and clear to auscultation, no crackles or wheezing  CARDIOVASCULAR:  Regular rate and rhythm with no murmurs, gallops, rubs, or abnormal heart sounds. The carotid upstroke is normal in amplitude and contour without delay or bruit. JVP is not elevated  ABDOMEN:  Normal bowel sounds, non-distended and non-tender to palpation. No liver or spleen enlargement. EXT: No edema, no calf tenderness. Pulses are present bilaterally.     DATA:    Lab Results   Component Value Date    ALT 30 04/19/2017    AST 18

## 2018-11-14 DIAGNOSIS — E78.49 OTHER HYPERLIPIDEMIA: ICD-10-CM

## 2018-11-14 DIAGNOSIS — I10 ESSENTIAL HYPERTENSION: ICD-10-CM

## 2018-11-14 LAB
A/G RATIO: 1.4 (ref 1.1–2.2)
ALBUMIN SERPL-MCNC: 4.5 G/DL (ref 3.4–5)
ALP BLD-CCNC: 101 U/L (ref 40–129)
ALT SERPL-CCNC: 21 U/L (ref 10–40)
ANION GAP SERPL CALCULATED.3IONS-SCNC: 14 MMOL/L (ref 3–16)
AST SERPL-CCNC: 19 U/L (ref 15–37)
BASOPHILS ABSOLUTE: 0.1 K/UL (ref 0–0.2)
BASOPHILS RELATIVE PERCENT: 0.6 %
BILIRUB SERPL-MCNC: 0.6 MG/DL (ref 0–1)
BUN BLDV-MCNC: 14 MG/DL (ref 7–20)
CALCIUM SERPL-MCNC: 10.1 MG/DL (ref 8.3–10.6)
CHLORIDE BLD-SCNC: 101 MMOL/L (ref 99–110)
CHOLESTEROL, TOTAL: 211 MG/DL (ref 0–199)
CO2: 26 MMOL/L (ref 21–32)
CREAT SERPL-MCNC: 0.8 MG/DL (ref 0.8–1.3)
EOSINOPHILS ABSOLUTE: 0.3 K/UL (ref 0–0.6)
EOSINOPHILS RELATIVE PERCENT: 3 %
GFR AFRICAN AMERICAN: >60
GFR NON-AFRICAN AMERICAN: >60
GLOBULIN: 3.2 G/DL
GLUCOSE BLD-MCNC: 109 MG/DL (ref 70–99)
HCT VFR BLD CALC: 45 % (ref 40.5–52.5)
HDLC SERPL-MCNC: 37 MG/DL (ref 40–60)
HEMOGLOBIN: 14.8 G/DL (ref 13.5–17.5)
LDL CHOLESTEROL CALCULATED: 149 MG/DL
LYMPHOCYTES ABSOLUTE: 2.9 K/UL (ref 1–5.1)
LYMPHOCYTES RELATIVE PERCENT: 31.8 %
MCH RBC QN AUTO: 27.5 PG (ref 26–34)
MCHC RBC AUTO-ENTMCNC: 33 G/DL (ref 31–36)
MCV RBC AUTO: 83.4 FL (ref 80–100)
MONOCYTES ABSOLUTE: 0.8 K/UL (ref 0–1.3)
MONOCYTES RELATIVE PERCENT: 8.1 %
NEUTROPHILS ABSOLUTE: 5.2 K/UL (ref 1.7–7.7)
NEUTROPHILS RELATIVE PERCENT: 56.5 %
PDW BLD-RTO: 14.6 % (ref 12.4–15.4)
PLATELET # BLD: 233 K/UL (ref 135–450)
PMV BLD AUTO: 9.5 FL (ref 5–10.5)
POTASSIUM SERPL-SCNC: 4.8 MMOL/L (ref 3.5–5.1)
RBC # BLD: 5.39 M/UL (ref 4.2–5.9)
SODIUM BLD-SCNC: 141 MMOL/L (ref 136–145)
TOTAL PROTEIN: 7.7 G/DL (ref 6.4–8.2)
TRIGL SERPL-MCNC: 126 MG/DL (ref 0–150)
VLDLC SERPL CALC-MCNC: 25 MG/DL
WBC # BLD: 9.3 K/UL (ref 4–11)

## 2018-12-10 ENCOUNTER — TELEPHONE (OUTPATIENT)
Dept: INTERNAL MEDICINE CLINIC | Age: 68
End: 2018-12-10

## 2018-12-19 ENCOUNTER — TELEPHONE (OUTPATIENT)
Dept: INTERNAL MEDICINE CLINIC | Age: 68
End: 2018-12-19

## 2018-12-19 DIAGNOSIS — M19.90 ARTHRITIS: Primary | ICD-10-CM

## 2019-01-10 ENCOUNTER — APPOINTMENT (OUTPATIENT)
Dept: GENERAL RADIOLOGY | Age: 69
End: 2019-01-10
Payer: MEDICARE

## 2019-01-10 ENCOUNTER — HOSPITAL ENCOUNTER (EMERGENCY)
Age: 69
Discharge: HOME | End: 2019-01-10
Attending: EMERGENCY MEDICINE
Payer: MEDICARE

## 2019-01-10 VITALS
WEIGHT: 192 LBS | SYSTOLIC BLOOD PRESSURE: 134 MMHG | DIASTOLIC BLOOD PRESSURE: 72 MMHG | HEIGHT: 71 IN | TEMPERATURE: 98 F | HEART RATE: 74 BPM | OXYGEN SATURATION: 99 % | RESPIRATION RATE: 14 BRPM | BODY MASS INDEX: 26.88 KG/M2

## 2019-01-10 DIAGNOSIS — R07.9 CHEST PAIN, UNSPECIFIED TYPE: Primary | ICD-10-CM

## 2019-01-10 LAB
A/G RATIO: 1.4 (ref 1.1–2.2)
ALBUMIN SERPL-MCNC: 4.7 G/DL (ref 3.4–5)
ALP BLD-CCNC: 100 U/L (ref 40–129)
ALT SERPL-CCNC: 22 U/L (ref 10–40)
ANION GAP SERPL CALCULATED.3IONS-SCNC: 13 MMOL/L (ref 3–16)
AST SERPL-CCNC: 21 U/L (ref 15–37)
BASOPHILS ABSOLUTE: 0.1 K/UL (ref 0–0.2)
BASOPHILS RELATIVE PERCENT: 1 %
BILIRUB SERPL-MCNC: 0.6 MG/DL (ref 0–1)
BUN BLDV-MCNC: 14 MG/DL (ref 7–20)
CALCIUM SERPL-MCNC: 10.1 MG/DL (ref 8.3–10.6)
CHLORIDE BLD-SCNC: 102 MMOL/L (ref 99–110)
CO2: 27 MMOL/L (ref 21–32)
CREAT SERPL-MCNC: 0.8 MG/DL (ref 0.8–1.3)
EKG ATRIAL RATE: 66 BPM
EKG DIAGNOSIS: NORMAL
EKG P AXIS: 36 DEGREES
EKG P-R INTERVAL: 168 MS
EKG Q-T INTERVAL: 396 MS
EKG QRS DURATION: 82 MS
EKG QTC CALCULATION (BAZETT): 415 MS
EKG R AXIS: 14 DEGREES
EKG T AXIS: 38 DEGREES
EKG VENTRICULAR RATE: 66 BPM
EOSINOPHILS ABSOLUTE: 0.2 K/UL (ref 0–0.6)
EOSINOPHILS RELATIVE PERCENT: 2.6 %
GFR AFRICAN AMERICAN: >60
GFR NON-AFRICAN AMERICAN: >60
GLOBULIN: 3.4 G/DL
GLUCOSE BLD-MCNC: 120 MG/DL (ref 70–99)
HCT VFR BLD CALC: 47.1 % (ref 40.5–52.5)
HEMOGLOBIN: 15.8 G/DL (ref 13.5–17.5)
LYMPHOCYTES ABSOLUTE: 3 K/UL (ref 1–5.1)
LYMPHOCYTES RELATIVE PERCENT: 34.2 %
MCH RBC QN AUTO: 28.4 PG (ref 26–34)
MCHC RBC AUTO-ENTMCNC: 33.5 G/DL (ref 31–36)
MCV RBC AUTO: 84.9 FL (ref 80–100)
MONOCYTES ABSOLUTE: 0.8 K/UL (ref 0–1.3)
MONOCYTES RELATIVE PERCENT: 9.4 %
NEUTROPHILS ABSOLUTE: 4.6 K/UL (ref 1.7–7.7)
NEUTROPHILS RELATIVE PERCENT: 52.8 %
PDW BLD-RTO: 15.2 % (ref 12.4–15.4)
PLATELET # BLD: 247 K/UL (ref 135–450)
PMV BLD AUTO: 9.2 FL (ref 5–10.5)
POTASSIUM SERPL-SCNC: 4.1 MMOL/L (ref 3.5–5.1)
RBC # BLD: 5.55 M/UL (ref 4.2–5.9)
SODIUM BLD-SCNC: 142 MMOL/L (ref 136–145)
TOTAL PROTEIN: 8.1 G/DL (ref 6.4–8.2)
TROPONIN: <0.01 NG/ML
TROPONIN: <0.01 NG/ML
WBC # BLD: 8.7 K/UL (ref 4–11)

## 2019-01-10 PROCEDURE — 36415 COLL VENOUS BLD VENIPUNCTURE: CPT

## 2019-01-10 PROCEDURE — 84484 ASSAY OF TROPONIN QUANT: CPT

## 2019-01-10 PROCEDURE — 93005 ELECTROCARDIOGRAM TRACING: CPT | Performed by: EMERGENCY MEDICINE

## 2019-01-10 PROCEDURE — 71045 X-RAY EXAM CHEST 1 VIEW: CPT

## 2019-01-10 PROCEDURE — 85025 COMPLETE CBC W/AUTO DIFF WBC: CPT

## 2019-01-10 PROCEDURE — 99285 EMERGENCY DEPT VISIT HI MDM: CPT

## 2019-01-10 PROCEDURE — 93010 ELECTROCARDIOGRAM REPORT: CPT | Performed by: INTERNAL MEDICINE

## 2019-01-10 PROCEDURE — 80053 COMPREHEN METABOLIC PANEL: CPT

## 2019-01-10 RX ORDER — ISOSORBIDE MONONITRATE 30 MG/1
30 TABLET, EXTENDED RELEASE ORAL DAILY
Qty: 7 TABLET | Refills: 0 | Status: SHIPPED | OUTPATIENT
Start: 2019-01-10 | End: 2019-09-13

## 2019-01-10 ASSESSMENT — PAIN DESCRIPTION - DESCRIPTORS: DESCRIPTORS: DULL

## 2019-01-10 ASSESSMENT — PAIN DESCRIPTION - LOCATION: LOCATION: CHEST

## 2019-01-10 ASSESSMENT — PAIN DESCRIPTION - PAIN TYPE: TYPE: ACUTE PAIN

## 2019-01-10 ASSESSMENT — PAIN SCALES - GENERAL: PAINLEVEL_OUTOF10: 3

## 2019-01-10 ASSESSMENT — ENCOUNTER SYMPTOMS
VOMITING: 0
DIARRHEA: 0
ABDOMINAL PAIN: 0
SHORTNESS OF BREATH: 0
NAUSEA: 0

## 2019-01-10 ASSESSMENT — PAIN DESCRIPTION - FREQUENCY: FREQUENCY: INTERMITTENT

## 2019-01-10 ASSESSMENT — PAIN DESCRIPTION - ORIENTATION: ORIENTATION: LEFT

## 2019-02-06 ENCOUNTER — ANESTHESIA EVENT (OUTPATIENT)
Dept: ENDOSCOPY | Age: 69
End: 2019-02-06
Payer: MEDICARE

## 2019-02-06 ENCOUNTER — ANESTHESIA (OUTPATIENT)
Dept: ENDOSCOPY | Age: 69
End: 2019-02-06
Payer: MEDICARE

## 2019-02-06 ENCOUNTER — HOSPITAL ENCOUNTER (OUTPATIENT)
Age: 69
Setting detail: OUTPATIENT SURGERY
Discharge: HOME | End: 2019-02-06
Attending: INTERNAL MEDICINE | Admitting: INTERNAL MEDICINE
Payer: MEDICARE

## 2019-02-06 VITALS
RESPIRATION RATE: 16 BRPM | OXYGEN SATURATION: 95 % | HEART RATE: 87 BPM | TEMPERATURE: 99.1 F | SYSTOLIC BLOOD PRESSURE: 130 MMHG | BODY MASS INDEX: 26.88 KG/M2 | HEIGHT: 71 IN | WEIGHT: 192 LBS | DIASTOLIC BLOOD PRESSURE: 70 MMHG

## 2019-02-06 VITALS — OXYGEN SATURATION: 91 % | SYSTOLIC BLOOD PRESSURE: 153 MMHG | DIASTOLIC BLOOD PRESSURE: 77 MMHG

## 2019-02-06 PROCEDURE — 2500000003 HC RX 250 WO HCPCS: Performed by: ANESTHESIOLOGY

## 2019-02-06 PROCEDURE — 6360000002 HC RX W HCPCS: Performed by: NURSE ANESTHETIST, CERTIFIED REGISTERED

## 2019-02-06 PROCEDURE — 2500000003 HC RX 250 WO HCPCS: Performed by: NURSE ANESTHETIST, CERTIFIED REGISTERED

## 2019-02-06 PROCEDURE — 7100000011 HC PHASE II RECOVERY - ADDTL 15 MIN: Performed by: INTERNAL MEDICINE

## 2019-02-06 PROCEDURE — 6360000002 HC RX W HCPCS: Performed by: ANESTHESIOLOGY

## 2019-02-06 PROCEDURE — 88342 IMHCHEM/IMCYTCHM 1ST ANTB: CPT

## 2019-02-06 PROCEDURE — 7100000010 HC PHASE II RECOVERY - FIRST 15 MIN: Performed by: INTERNAL MEDICINE

## 2019-02-06 PROCEDURE — 3700000000 HC ANESTHESIA ATTENDED CARE: Performed by: INTERNAL MEDICINE

## 2019-02-06 PROCEDURE — 2709999900 HC NON-CHARGEABLE SUPPLY: Performed by: INTERNAL MEDICINE

## 2019-02-06 PROCEDURE — 3609010200 HC COLONOSCOPY ABLATION TUMOR POLYP/OTHER LES: Performed by: INTERNAL MEDICINE

## 2019-02-06 PROCEDURE — 88305 TISSUE EXAM BY PATHOLOGIST: CPT

## 2019-02-06 PROCEDURE — 3700000001 HC ADD 15 MINUTES (ANESTHESIA): Performed by: INTERNAL MEDICINE

## 2019-02-06 PROCEDURE — 2580000003 HC RX 258: Performed by: NURSE ANESTHETIST, CERTIFIED REGISTERED

## 2019-02-06 RX ORDER — LIDOCAINE HYDROCHLORIDE 20 MG/ML
INJECTION, SOLUTION INFILTRATION; PERINEURAL PRN
Status: DISCONTINUED | OUTPATIENT
Start: 2019-02-06 | End: 2019-02-06 | Stop reason: SDUPTHER

## 2019-02-06 RX ORDER — PROPOFOL 10 MG/ML
INJECTION, EMULSION INTRAVENOUS CONTINUOUS PRN
Status: DISCONTINUED | OUTPATIENT
Start: 2019-02-06 | End: 2019-02-06 | Stop reason: SDUPTHER

## 2019-02-06 RX ORDER — SODIUM CHLORIDE, SODIUM LACTATE, POTASSIUM CHLORIDE, CALCIUM CHLORIDE 600; 310; 30; 20 MG/100ML; MG/100ML; MG/100ML; MG/100ML
INJECTION, SOLUTION INTRAVENOUS CONTINUOUS PRN
Status: DISCONTINUED | OUTPATIENT
Start: 2019-02-06 | End: 2019-02-06 | Stop reason: SDUPTHER

## 2019-02-06 RX ORDER — GLYCOPYRROLATE 0.2 MG/ML
INJECTION INTRAMUSCULAR; INTRAVENOUS PRN
Status: DISCONTINUED | OUTPATIENT
Start: 2019-02-06 | End: 2019-02-06 | Stop reason: SDUPTHER

## 2019-02-06 RX ORDER — PROPOFOL 10 MG/ML
INJECTION, EMULSION INTRAVENOUS PRN
Status: DISCONTINUED | OUTPATIENT
Start: 2019-02-06 | End: 2019-02-06 | Stop reason: SDUPTHER

## 2019-02-06 RX ORDER — DIPHENHYDRAMINE HYDROCHLORIDE 50 MG/ML
50 INJECTION INTRAMUSCULAR; INTRAVENOUS ONCE
Status: COMPLETED | OUTPATIENT
Start: 2019-02-06 | End: 2019-02-06

## 2019-02-06 RX ADMIN — FAMOTIDINE 20 MG: 10 INJECTION, SOLUTION INTRAVENOUS at 13:22

## 2019-02-06 RX ADMIN — DIPHENHYDRAMINE HYDROCHLORIDE 50 MG: 50 INJECTION, SOLUTION INTRAMUSCULAR; INTRAVENOUS at 13:21

## 2019-02-06 RX ADMIN — PROPOFOL 50 MG: 10 INJECTION, EMULSION INTRAVENOUS at 13:37

## 2019-02-06 RX ADMIN — LIDOCAINE HYDROCHLORIDE 50 MG: 20 INJECTION, SOLUTION INFILTRATION; PERINEURAL at 13:37

## 2019-02-06 RX ADMIN — SODIUM CHLORIDE, SODIUM LACTATE, POTASSIUM CHLORIDE, AND CALCIUM CHLORIDE: 600; 310; 30; 20 INJECTION, SOLUTION INTRAVENOUS at 12:56

## 2019-02-06 RX ADMIN — GLYCOPYRROLATE 0.2 MG: 0.2 INJECTION INTRAMUSCULAR; INTRAVENOUS at 13:28

## 2019-02-06 RX ADMIN — PROPOFOL 150 MCG/KG/MIN: 10 INJECTION, EMULSION INTRAVENOUS at 13:37

## 2019-02-06 ASSESSMENT — PULMONARY FUNCTION TESTS
PIF_VALUE: 0

## 2019-02-06 ASSESSMENT — LIFESTYLE VARIABLES: SMOKING_STATUS: 0

## 2019-02-06 ASSESSMENT — PAIN - FUNCTIONAL ASSESSMENT: PAIN_FUNCTIONAL_ASSESSMENT: 0-10

## 2019-02-06 ASSESSMENT — PAIN SCALES - GENERAL: PAINLEVEL_OUTOF10: 0

## 2019-02-19 ENCOUNTER — OFFICE VISIT (OUTPATIENT)
Dept: INTERNAL MEDICINE CLINIC | Age: 69
End: 2019-02-19
Payer: MEDICARE

## 2019-02-19 VITALS
SYSTOLIC BLOOD PRESSURE: 122 MMHG | HEIGHT: 71 IN | DIASTOLIC BLOOD PRESSURE: 70 MMHG | WEIGHT: 208.8 LBS | BODY MASS INDEX: 29.23 KG/M2 | TEMPERATURE: 99.1 F

## 2019-02-19 DIAGNOSIS — J01.00 ACUTE NON-RECURRENT MAXILLARY SINUSITIS: Primary | ICD-10-CM

## 2019-02-19 PROCEDURE — G8510 SCR DEP NEG, NO PLAN REQD: HCPCS | Performed by: INTERNAL MEDICINE

## 2019-02-19 PROCEDURE — G8598 ASA/ANTIPLAT THER USED: HCPCS | Performed by: INTERNAL MEDICINE

## 2019-02-19 PROCEDURE — G8484 FLU IMMUNIZE NO ADMIN: HCPCS | Performed by: INTERNAL MEDICINE

## 2019-02-19 PROCEDURE — 99213 OFFICE O/P EST LOW 20 MIN: CPT | Performed by: INTERNAL MEDICINE

## 2019-02-19 PROCEDURE — 1036F TOBACCO NON-USER: CPT | Performed by: INTERNAL MEDICINE

## 2019-02-19 PROCEDURE — G8417 CALC BMI ABV UP PARAM F/U: HCPCS | Performed by: INTERNAL MEDICINE

## 2019-02-19 PROCEDURE — 4040F PNEUMOC VAC/ADMIN/RCVD: CPT | Performed by: INTERNAL MEDICINE

## 2019-02-19 PROCEDURE — 3017F COLORECTAL CA SCREEN DOC REV: CPT | Performed by: INTERNAL MEDICINE

## 2019-02-19 PROCEDURE — 1101F PT FALLS ASSESS-DOCD LE1/YR: CPT | Performed by: INTERNAL MEDICINE

## 2019-02-19 PROCEDURE — 1123F ACP DISCUSS/DSCN MKR DOCD: CPT | Performed by: INTERNAL MEDICINE

## 2019-02-19 PROCEDURE — G8427 DOCREV CUR MEDS BY ELIG CLIN: HCPCS | Performed by: INTERNAL MEDICINE

## 2019-02-19 RX ORDER — PREDNISONE 10 MG/1
10 TABLET ORAL DAILY
Qty: 5 TABLET | Refills: 0 | Status: SHIPPED | OUTPATIENT
Start: 2019-02-19 | End: 2019-02-24

## 2019-02-19 RX ORDER — METRONIDAZOLE 500 MG/1
500 TABLET ORAL 2 TIMES DAILY
Qty: 14 TABLET | Refills: 0 | Status: SHIPPED | OUTPATIENT
Start: 2019-02-19 | End: 2019-02-26

## 2019-02-19 ASSESSMENT — PATIENT HEALTH QUESTIONNAIRE - PHQ9
2. FEELING DOWN, DEPRESSED OR HOPELESS: 0
SUM OF ALL RESPONSES TO PHQ QUESTIONS 1-9: 0
1. LITTLE INTEREST OR PLEASURE IN DOING THINGS: 0
SUM OF ALL RESPONSES TO PHQ QUESTIONS 1-9: 0
SUM OF ALL RESPONSES TO PHQ9 QUESTIONS 1 & 2: 0

## 2019-05-08 RX ORDER — METRONIDAZOLE 500 MG/1
500 TABLET ORAL 2 TIMES DAILY
Qty: 6 TABLET | Refills: 0 | Status: SHIPPED | OUTPATIENT
Start: 2019-05-08 | End: 2019-10-24 | Stop reason: SDUPTHER

## 2019-05-08 NOTE — TELEPHONE ENCOUNTER
6/12/18 LT ANT THR REKHA    Patient has a dental appt coming up. He is requesting Flagyl for his antibiotic. Is this ok? Allergic to PCN & Clindamycin.

## 2019-08-23 ENCOUNTER — OFFICE VISIT (OUTPATIENT)
Dept: RHEUMATOLOGY | Age: 69
End: 2019-08-23
Payer: MEDICARE

## 2019-08-23 VITALS
WEIGHT: 211 LBS | DIASTOLIC BLOOD PRESSURE: 84 MMHG | BODY MASS INDEX: 29.54 KG/M2 | SYSTOLIC BLOOD PRESSURE: 128 MMHG | HEIGHT: 71 IN

## 2019-08-23 DIAGNOSIS — M15.9 GENERALIZED OSTEOARTHRITIS: Primary | ICD-10-CM

## 2019-08-23 PROCEDURE — G8598 ASA/ANTIPLAT THER USED: HCPCS | Performed by: INTERNAL MEDICINE

## 2019-08-23 PROCEDURE — 1036F TOBACCO NON-USER: CPT | Performed by: INTERNAL MEDICINE

## 2019-08-23 PROCEDURE — G8417 CALC BMI ABV UP PARAM F/U: HCPCS | Performed by: INTERNAL MEDICINE

## 2019-08-23 PROCEDURE — 3017F COLORECTAL CA SCREEN DOC REV: CPT | Performed by: INTERNAL MEDICINE

## 2019-08-23 PROCEDURE — G8427 DOCREV CUR MEDS BY ELIG CLIN: HCPCS | Performed by: INTERNAL MEDICINE

## 2019-08-23 PROCEDURE — 4040F PNEUMOC VAC/ADMIN/RCVD: CPT | Performed by: INTERNAL MEDICINE

## 2019-08-23 PROCEDURE — 1123F ACP DISCUSS/DSCN MKR DOCD: CPT | Performed by: INTERNAL MEDICINE

## 2019-08-23 PROCEDURE — 99204 OFFICE O/P NEW MOD 45 MIN: CPT | Performed by: INTERNAL MEDICINE

## 2019-08-23 RX ORDER — DULOXETIN HYDROCHLORIDE 20 MG/1
20 CAPSULE, DELAYED RELEASE ORAL DAILY
Qty: 30 CAPSULE | Refills: 1 | Status: SHIPPED | OUTPATIENT
Start: 2019-08-23 | End: 2019-10-10 | Stop reason: SDUPTHER

## 2019-08-23 NOTE — PROGRESS NOTES
[address], [place]                                                           [address] [place] [address]                                                             [phone] (z) [phone] (F)      Dear Dr. Vladimir Tinoco MD:  Please find Rheumatology assessment. Thank you for giving me the opportunity to be involved in [facility] care and I look forward following Mercedes Alonso along with you. If you have any questions or concerns please feel free to reach me. Note is transcribed using voice recognition software. Inadvertent computerized transcription errors may be present. Patient identification: Collette Human: [id number],88 y.o. Sex: male     A/P  Mercedes Alonso was seen today for joint pain. Diagnoses and all orders for this visit:    Generalized osteoarthritis    Other orders  -     DULoxetine (CYMBALTA) 20 MG extended release capsule; Take 1 capsule by mouth daily      History, physical examination is consistent with generalized osteoarthritis-symptomatic in shoulders, neck, lower back, hips, knees and finger joints. Has been taking turmeric p.m. supplement that helps to some extent. Unable to take NSAIDs because of coronary artery disease and history of myocardial infarction. He also has angioedema. Plan-  Osteoarthritis/ Joint pain : Discussed about diagnosis and management of osteoarthritis. There are no FDA approved disease modifying agents. Goal is to control pain and increase mobility. Discussed the importance of wt loss, exercises, formal PT, joint braces/splints. First line of agent Tylenol arthritis, NSAIDs systemic and/ or topical,Cymbalta, pain meds, joint injections- steroids, and visco supplementaiton for knee OA. Also discussed about surgical options.     In his full fist, subjective arthralgia. No synovitis or focal tenderness. Shoulders-bony crepitus, subjective pain, has full range of motion without any other objective findings. Hips-mild limitation in abduction bilaterally associated with groin discomfort. Bony crepitus, bony swelling in his knees without any effusion, warmth or tenderness. Subjective paraspinal muscle spasm throughout cervical, lumbar and thoracic. Range of motion not limited in cervical spine. I do not appreciate any overtly inflamed joints in upper or lower extremities. Normal gait, muscle strength in upper and lower extremities. Skin: No rashes, no induration or skin thickening or nodules. No evidence ischemia or deformities noted in digits or nails. HEENT: Normal lids, lacrimal glands and pupils. No oral or nasal ulcers. Salivary glands reveal no evidence of abnormality. External inspection of the ears and nose within normal limits. Neck: No masses or asymmetry. No thyroid enlargement. Chest: Normal effort, clear to auscultation. Heart:  Normal s1/s2, no leg edema. Neurologic: normal deep tendon reflexes. No foot or wrist drop. DATA:   Lab Results   Component Value Date    WBC 8.7 01/10/2019    HGB 15.8 01/10/2019    HCT 47.1 01/10/2019    MCV 84.9 01/10/2019     01/10/2019         Chemistry        Component Value Date/Time     01/10/2019 0920    K 4.1 01/10/2019 0920    K 4.2 07/27/2018 0640     01/10/2019 0920    CO2 27 01/10/2019 0920    BUN 14 01/10/2019 0920    CREATININE 0.8 01/10/2019 0920        Component Value Date/Time    CALCIUM 10.1 01/10/2019 0920    ALKPHOS 100 01/10/2019 0920    AST 21 01/10/2019 0920    ALT 22 01/10/2019 0920    BILITOT 0.6 01/10/2019 0920           Lab Results   Component Value Date    VITD25 32.8 12/06/2010       A/P- See above.

## 2019-09-10 ENCOUNTER — TELEPHONE (OUTPATIENT)
Dept: INTERNAL MEDICINE CLINIC | Age: 69
End: 2019-09-10

## 2019-09-10 NOTE — TELEPHONE ENCOUNTER
Placed call to pt to see if any questions/concerns following new pt visit   Pt started cymbalta -pain is better Able to do things he has not done in years and he appreciates F/u appt has been scheduled

## 2019-09-13 ENCOUNTER — OFFICE VISIT (OUTPATIENT)
Dept: INTERNAL MEDICINE CLINIC | Age: 69
End: 2019-09-13
Payer: MEDICARE

## 2019-09-13 VITALS
HEIGHT: 71 IN | BODY MASS INDEX: 29.54 KG/M2 | DIASTOLIC BLOOD PRESSURE: 62 MMHG | WEIGHT: 211 LBS | SYSTOLIC BLOOD PRESSURE: 128 MMHG

## 2019-09-13 DIAGNOSIS — I50.9 CHRONIC CONGESTIVE HEART FAILURE, UNSPECIFIED HEART FAILURE TYPE (HCC): ICD-10-CM

## 2019-09-13 DIAGNOSIS — I25.83 CORONARY ARTERY DISEASE DUE TO LIPID RICH PLAQUE: ICD-10-CM

## 2019-09-13 DIAGNOSIS — Z00.00 ROUTINE GENERAL MEDICAL EXAMINATION AT A HEALTH CARE FACILITY: ICD-10-CM

## 2019-09-13 DIAGNOSIS — Z00.00 MEDICARE ANNUAL WELLNESS VISIT, SUBSEQUENT: Primary | ICD-10-CM

## 2019-09-13 DIAGNOSIS — I25.10 CORONARY ARTERY DISEASE DUE TO LIPID RICH PLAQUE: ICD-10-CM

## 2019-09-13 DIAGNOSIS — Z23 NEED FOR VACCINATION WITH 13-POLYVALENT PNEUMOCOCCAL CONJUGATE VACCINE: ICD-10-CM

## 2019-09-13 DIAGNOSIS — I10 ESSENTIAL HYPERTENSION: ICD-10-CM

## 2019-09-13 PROCEDURE — 3017F COLORECTAL CA SCREEN DOC REV: CPT | Performed by: INTERNAL MEDICINE

## 2019-09-13 PROCEDURE — G0439 PPPS, SUBSEQ VISIT: HCPCS | Performed by: INTERNAL MEDICINE

## 2019-09-13 PROCEDURE — 4040F PNEUMOC VAC/ADMIN/RCVD: CPT | Performed by: INTERNAL MEDICINE

## 2019-09-13 PROCEDURE — 1123F ACP DISCUSS/DSCN MKR DOCD: CPT | Performed by: INTERNAL MEDICINE

## 2019-09-13 PROCEDURE — G8598 ASA/ANTIPLAT THER USED: HCPCS | Performed by: INTERNAL MEDICINE

## 2019-09-13 RX ORDER — MONTELUKAST SODIUM 10 MG/1
TABLET ORAL
Qty: 90 TABLET | Refills: 3 | Status: SHIPPED | OUTPATIENT
Start: 2019-09-13 | End: 2020-10-26

## 2019-09-13 RX ORDER — VERAPAMIL HYDROCHLORIDE 240 MG/1
240 CAPSULE, EXTENDED RELEASE ORAL 2 TIMES DAILY
Qty: 180 CAPSULE | Refills: 3 | Status: SHIPPED | OUTPATIENT
Start: 2019-09-13 | End: 2021-01-12 | Stop reason: SDUPTHER

## 2019-09-13 ASSESSMENT — PATIENT HEALTH QUESTIONNAIRE - PHQ9
1. LITTLE INTEREST OR PLEASURE IN DOING THINGS: 0
SUM OF ALL RESPONSES TO PHQ9 QUESTIONS 1 & 2: 0
2. FEELING DOWN, DEPRESSED OR HOPELESS: 0
SUM OF ALL RESPONSES TO PHQ QUESTIONS 1-9: 0
SUM OF ALL RESPONSES TO PHQ QUESTIONS 1-9: 0

## 2019-09-13 NOTE — PROGRESS NOTES
urticaria     MI (myocardial infarction) ([facility] [address]     10/22/2017    Osteoarthritis     Tear of medial cartilage or meniscus of knee, current     Unspecified essential hypertension      Past Surgical History:   Procedure Laterality Date    APPENDECTOMY      CARDIAC SURGERY      cardiac stents x2    COLONOSCOPY N/A 7/25/2018    COLONOSCOPY WITH BIOPSY OF RECTAL MASS performed by Darian Posada MD at [facility] COLONOSCOPY N/A 2/6/2019    COLONOSCOPY POLYPECTOMY hot snare of transverse colon polyp and proximal rectal polyp  performed by Karlos Mayes MD at [address] [facility]  2017    HIP SURGERY      JOINT REPLACEMENT      OTHER SURGICAL HISTORY  06/12/2018    LEFT ANTERIOR HIP ARTHROPLASTY MAKOPLASTY    TX COLONOSCOPY FLX DX W/COLLJ SPEC WHEN PFRMD N/A 10/9/2018    COLONOSCOPY WITH COLD FORCEP POLYPECTOMY performed by Lizabeth Grace MD at [address] [facility] EGD TRANSORAL BIOPSY SINGLE/MULTIPLE N/A 7/25/2018    EGD ESOPHAGOGASTRODUODENOSCOPY performed by Darian Posada MD at [facility] LESION(S) N/A 10/9/2018    TRANSANAL EXCISION OF RECTAL POLYP, TRANSANAL MINIMALLY INVASIVE SURGERY (TAMIS); EXCISION RECTAL MASS performed by Lizabeth Grace MD at [facility] History   Problem Relation Age of Onset    Celiac Disease Daughter        CareTeam (Including outside providers/suppliers regularly involved in providing care):   Patient Care Team:  Shayna Majano MD as PCP - General (Internal Medicine)  Shayna Majano MD as PCP - [facility] Empaneled Provider    Wt Readings from Last 3 Encounters:   09/13/19 211 lb (95.7 kg)   08/23/19 211 lb (95.7 kg)   02/19/19 208 lb 12.8 oz (94.7 kg)     Vitals:    09/13/19 0948 09/13/19 0955 09/13/19 1040   BP: (!) 150/80 (!) 150/80 128/62   Weight: 211 lb (95.7 kg)     Height: 5' 11\" (1.803 m)       Body mass index is 29.43 kg/m².     Based upon direct observation of the patient, evaluation of

## 2019-09-26 DIAGNOSIS — I10 ESSENTIAL HYPERTENSION: ICD-10-CM

## 2019-09-26 DIAGNOSIS — I25.10 CORONARY ARTERY DISEASE DUE TO LIPID RICH PLAQUE: ICD-10-CM

## 2019-09-26 DIAGNOSIS — I25.83 CORONARY ARTERY DISEASE DUE TO LIPID RICH PLAQUE: ICD-10-CM

## 2019-09-26 LAB
A/G RATIO: 1.7 (ref 1.1–2.2)
ALBUMIN SERPL-MCNC: 4.7 G/DL (ref 3.4–5)
ALP BLD-CCNC: 98 U/L (ref 40–129)
ALT SERPL-CCNC: 21 U/L (ref 10–40)
ANION GAP SERPL CALCULATED.3IONS-SCNC: 16 MMOL/L (ref 3–16)
AST SERPL-CCNC: 20 U/L (ref 15–37)
BASOPHILS ABSOLUTE: 0.1 K/UL (ref 0–0.2)
BASOPHILS RELATIVE PERCENT: 0.7 %
BILIRUB SERPL-MCNC: 0.5 MG/DL (ref 0–1)
BUN BLDV-MCNC: 11 MG/DL (ref 7–20)
CALCIUM SERPL-MCNC: 10 MG/DL (ref 8.3–10.6)
CHLORIDE BLD-SCNC: 104 MMOL/L (ref 99–110)
CHOLESTEROL, TOTAL: 191 MG/DL (ref 0–199)
CO2: 23 MMOL/L (ref 21–32)
CREAT SERPL-MCNC: 0.9 MG/DL (ref 0.8–1.3)
EOSINOPHILS ABSOLUTE: 0.3 K/UL (ref 0–0.6)
EOSINOPHILS RELATIVE PERCENT: 3.3 %
GFR AFRICAN AMERICAN: >60
GFR NON-AFRICAN AMERICAN: >60
GLOBULIN: 2.8 G/DL
GLUCOSE BLD-MCNC: 116 MG/DL (ref 70–99)
HCT VFR BLD CALC: 46.2 % (ref 40.5–52.5)
HDLC SERPL-MCNC: 43 MG/DL (ref 40–60)
HEMOGLOBIN: 15.6 G/DL (ref 13.5–17.5)
LDL CHOLESTEROL CALCULATED: 128 MG/DL
LYMPHOCYTES ABSOLUTE: 2.7 K/UL (ref 1–5.1)
LYMPHOCYTES RELATIVE PERCENT: 32.6 %
MCH RBC QN AUTO: 30.3 PG (ref 26–34)
MCHC RBC AUTO-ENTMCNC: 33.8 G/DL (ref 31–36)
MCV RBC AUTO: 89.8 FL (ref 80–100)
MONOCYTES ABSOLUTE: 0.6 K/UL (ref 0–1.3)
MONOCYTES RELATIVE PERCENT: 7.3 %
NEUTROPHILS ABSOLUTE: 4.7 K/UL (ref 1.7–7.7)
NEUTROPHILS RELATIVE PERCENT: 56.1 %
PDW BLD-RTO: 12.9 % (ref 12.4–15.4)
PLATELET # BLD: 247 K/UL (ref 135–450)
PMV BLD AUTO: 9.9 FL (ref 5–10.5)
POTASSIUM SERPL-SCNC: 5.2 MMOL/L (ref 3.5–5.1)
RBC # BLD: 5.14 M/UL (ref 4.2–5.9)
SODIUM BLD-SCNC: 143 MMOL/L (ref 136–145)
TOTAL PROTEIN: 7.5 G/DL (ref 6.4–8.2)
TRIGL SERPL-MCNC: 100 MG/DL (ref 0–150)
VLDLC SERPL CALC-MCNC: 20 MG/DL
WBC # BLD: 8.4 K/UL (ref 4–11)

## 2019-09-27 DIAGNOSIS — R73.03 PREDIABETES: Primary | ICD-10-CM

## 2019-09-27 DIAGNOSIS — R73.03 PREDIABETES: ICD-10-CM

## 2019-09-27 LAB
ESTIMATED AVERAGE GLUCOSE: 122.6 MG/DL
HBA1C MFR BLD: 5.9 %

## 2019-10-24 RX ORDER — METRONIDAZOLE 500 MG/1
TABLET ORAL
Qty: 6 TABLET | Refills: 0 | Status: SHIPPED | OUTPATIENT
Start: 2019-10-24

## 2019-10-29 ENCOUNTER — OFFICE VISIT (OUTPATIENT)
Dept: RHEUMATOLOGY | Age: 69
End: 2019-10-29
Payer: MEDICARE

## 2019-10-29 VITALS
WEIGHT: 211 LBS | SYSTOLIC BLOOD PRESSURE: 122 MMHG | HEIGHT: 71 IN | DIASTOLIC BLOOD PRESSURE: 76 MMHG | BODY MASS INDEX: 29.54 KG/M2

## 2019-10-29 DIAGNOSIS — M15.9 GENERALIZED OSTEOARTHRITIS: Primary | ICD-10-CM

## 2019-10-29 PROCEDURE — G8427 DOCREV CUR MEDS BY ELIG CLIN: HCPCS | Performed by: INTERNAL MEDICINE

## 2019-10-29 PROCEDURE — 1123F ACP DISCUSS/DSCN MKR DOCD: CPT | Performed by: INTERNAL MEDICINE

## 2019-10-29 PROCEDURE — 3017F COLORECTAL CA SCREEN DOC REV: CPT | Performed by: INTERNAL MEDICINE

## 2019-10-29 PROCEDURE — 99214 OFFICE O/P EST MOD 30 MIN: CPT | Performed by: INTERNAL MEDICINE

## 2019-10-29 PROCEDURE — 4040F PNEUMOC VAC/ADMIN/RCVD: CPT | Performed by: INTERNAL MEDICINE

## 2019-10-29 PROCEDURE — 1036F TOBACCO NON-USER: CPT | Performed by: INTERNAL MEDICINE

## 2019-10-29 PROCEDURE — G8598 ASA/ANTIPLAT THER USED: HCPCS | Performed by: INTERNAL MEDICINE

## 2019-10-29 PROCEDURE — G8417 CALC BMI ABV UP PARAM F/U: HCPCS | Performed by: INTERNAL MEDICINE

## 2019-10-29 PROCEDURE — G8484 FLU IMMUNIZE NO ADMIN: HCPCS | Performed by: INTERNAL MEDICINE

## 2019-10-29 RX ORDER — DULOXETIN HYDROCHLORIDE 20 MG/1
20 CAPSULE, DELAYED RELEASE ORAL DAILY
Qty: 90 CAPSULE | Refills: 1 | Status: ON HOLD | OUTPATIENT
Start: 2019-10-29 | End: 2020-03-05

## 2019-12-17 ENCOUNTER — TELEPHONE (OUTPATIENT)
Dept: INTERNAL MEDICINE CLINIC | Age: 69
End: 2019-12-17

## 2019-12-17 RX ORDER — BENZONATATE 100 MG/1
100-200 CAPSULE ORAL 3 TIMES DAILY PRN
Qty: 60 CAPSULE | Refills: 0 | Status: SHIPPED | OUTPATIENT
Start: 2019-12-17 | End: 2019-12-24

## 2020-03-04 ENCOUNTER — ANESTHESIA EVENT (OUTPATIENT)
Dept: ENDOSCOPY | Age: 70
End: 2020-03-04
Payer: MEDICARE

## 2020-03-05 ENCOUNTER — ANESTHESIA (OUTPATIENT)
Dept: ENDOSCOPY | Age: 70
End: 2020-03-05
Payer: MEDICARE

## 2020-03-05 ENCOUNTER — HOSPITAL ENCOUNTER (OUTPATIENT)
Age: 70
Setting detail: OUTPATIENT SURGERY
Discharge: HOME | End: 2020-03-05
Attending: INTERNAL MEDICINE | Admitting: INTERNAL MEDICINE
Payer: MEDICARE

## 2020-03-05 VITALS
OXYGEN SATURATION: 94 % | HEIGHT: 71 IN | DIASTOLIC BLOOD PRESSURE: 79 MMHG | BODY MASS INDEX: 27.16 KG/M2 | RESPIRATION RATE: 15 BRPM | SYSTOLIC BLOOD PRESSURE: 113 MMHG | TEMPERATURE: 97.5 F | WEIGHT: 194 LBS | HEART RATE: 81 BPM

## 2020-03-05 VITALS — OXYGEN SATURATION: 94 % | SYSTOLIC BLOOD PRESSURE: 125 MMHG | DIASTOLIC BLOOD PRESSURE: 72 MMHG

## 2020-03-05 PROCEDURE — 2580000003 HC RX 258: Performed by: ANESTHESIOLOGY

## 2020-03-05 PROCEDURE — 2709999900 HC NON-CHARGEABLE SUPPLY: Performed by: INTERNAL MEDICINE

## 2020-03-05 PROCEDURE — 88305 TISSUE EXAM BY PATHOLOGIST: CPT

## 2020-03-05 PROCEDURE — 7100000010 HC PHASE II RECOVERY - FIRST 15 MIN: Performed by: INTERNAL MEDICINE

## 2020-03-05 PROCEDURE — 3609010200 HC COLONOSCOPY ABLATION TUMOR POLYP/OTHER LES: Performed by: INTERNAL MEDICINE

## 2020-03-05 PROCEDURE — 7100000011 HC PHASE II RECOVERY - ADDTL 15 MIN: Performed by: INTERNAL MEDICINE

## 2020-03-05 PROCEDURE — 6360000002 HC RX W HCPCS: Performed by: NURSE ANESTHETIST, CERTIFIED REGISTERED

## 2020-03-05 PROCEDURE — 3700000000 HC ANESTHESIA ATTENDED CARE: Performed by: INTERNAL MEDICINE

## 2020-03-05 PROCEDURE — 88342 IMHCHEM/IMCYTCHM 1ST ANTB: CPT

## 2020-03-05 PROCEDURE — 3700000001 HC ADD 15 MINUTES (ANESTHESIA): Performed by: INTERNAL MEDICINE

## 2020-03-05 RX ORDER — MEPERIDINE HYDROCHLORIDE 25 MG/ML
12.5 INJECTION INTRAMUSCULAR; INTRAVENOUS; SUBCUTANEOUS EVERY 5 MIN PRN
Status: DISCONTINUED | OUTPATIENT
Start: 2020-03-05 | End: 2020-03-05 | Stop reason: HOSPADM

## 2020-03-05 RX ORDER — LABETALOL 20 MG/4 ML (5 MG/ML) INTRAVENOUS SYRINGE
5 EVERY 10 MIN PRN
Status: DISCONTINUED | OUTPATIENT
Start: 2020-03-05 | End: 2020-03-05 | Stop reason: HOSPADM

## 2020-03-05 RX ORDER — HYDRALAZINE HYDROCHLORIDE 20 MG/ML
5 INJECTION INTRAMUSCULAR; INTRAVENOUS EVERY 10 MIN PRN
Status: DISCONTINUED | OUTPATIENT
Start: 2020-03-05 | End: 2020-03-05 | Stop reason: HOSPADM

## 2020-03-05 RX ORDER — LIDOCAINE HYDROCHLORIDE 20 MG/ML
INJECTION, SOLUTION INTRAVENOUS PRN
Status: DISCONTINUED | OUTPATIENT
Start: 2020-03-05 | End: 2020-03-05 | Stop reason: SDUPTHER

## 2020-03-05 RX ORDER — MORPHINE SULFATE 4 MG/ML
1 INJECTION, SOLUTION INTRAMUSCULAR; INTRAVENOUS EVERY 5 MIN PRN
Status: DISCONTINUED | OUTPATIENT
Start: 2020-03-05 | End: 2020-03-05 | Stop reason: HOSPADM

## 2020-03-05 RX ORDER — OXYCODONE HYDROCHLORIDE 5 MG/1
10 TABLET ORAL PRN
Status: DISCONTINUED | OUTPATIENT
Start: 2020-03-05 | End: 2020-03-05 | Stop reason: HOSPADM

## 2020-03-05 RX ORDER — SODIUM CHLORIDE 9 MG/ML
INJECTION, SOLUTION INTRAVENOUS CONTINUOUS
Status: DISCONTINUED | OUTPATIENT
Start: 2020-03-05 | End: 2020-03-05 | Stop reason: HOSPADM

## 2020-03-05 RX ORDER — PROMETHAZINE HYDROCHLORIDE 25 MG/ML
6.25 INJECTION, SOLUTION INTRAMUSCULAR; INTRAVENOUS
Status: DISCONTINUED | OUTPATIENT
Start: 2020-03-05 | End: 2020-03-05 | Stop reason: HOSPADM

## 2020-03-05 RX ORDER — DIPHENHYDRAMINE HYDROCHLORIDE 50 MG/ML
12.5 INJECTION INTRAMUSCULAR; INTRAVENOUS
Status: DISCONTINUED | OUTPATIENT
Start: 2020-03-05 | End: 2020-03-05 | Stop reason: HOSPADM

## 2020-03-05 RX ORDER — OXYCODONE HYDROCHLORIDE 5 MG/1
5 TABLET ORAL PRN
Status: DISCONTINUED | OUTPATIENT
Start: 2020-03-05 | End: 2020-03-05 | Stop reason: HOSPADM

## 2020-03-05 RX ORDER — PROPOFOL 10 MG/ML
INJECTION, EMULSION INTRAVENOUS PRN
Status: DISCONTINUED | OUTPATIENT
Start: 2020-03-05 | End: 2020-03-05 | Stop reason: SDUPTHER

## 2020-03-05 RX ORDER — SODIUM CHLORIDE 0.9 % (FLUSH) 0.9 %
10 SYRINGE (ML) INJECTION EVERY 12 HOURS SCHEDULED
Status: DISCONTINUED | OUTPATIENT
Start: 2020-03-05 | End: 2020-03-05 | Stop reason: HOSPADM

## 2020-03-05 RX ORDER — SODIUM CHLORIDE 0.9 % (FLUSH) 0.9 %
10 SYRINGE (ML) INJECTION PRN
Status: DISCONTINUED | OUTPATIENT
Start: 2020-03-05 | End: 2020-03-05 | Stop reason: HOSPADM

## 2020-03-05 RX ORDER — SODIUM CHLORIDE, SODIUM LACTATE, POTASSIUM CHLORIDE, CALCIUM CHLORIDE 600; 310; 30; 20 MG/100ML; MG/100ML; MG/100ML; MG/100ML
INJECTION, SOLUTION INTRAVENOUS CONTINUOUS
Status: DISCONTINUED | OUTPATIENT
Start: 2020-03-05 | End: 2020-03-05 | Stop reason: HOSPADM

## 2020-03-05 RX ORDER — METOCLOPRAMIDE HYDROCHLORIDE 5 MG/ML
10 INJECTION INTRAMUSCULAR; INTRAVENOUS
Status: DISCONTINUED | OUTPATIENT
Start: 2020-03-05 | End: 2020-03-05 | Stop reason: HOSPADM

## 2020-03-05 RX ADMIN — LIDOCAINE HYDROCHLORIDE 50 MG: 20 INJECTION, SOLUTION INTRAVENOUS at 07:39

## 2020-03-05 RX ADMIN — PROPOFOL 50 MG: 10 INJECTION, EMULSION INTRAVENOUS at 07:47

## 2020-03-05 RX ADMIN — PROPOFOL 50 MG: 10 INJECTION, EMULSION INTRAVENOUS at 07:42

## 2020-03-05 RX ADMIN — PROPOFOL 50 MG: 10 INJECTION, EMULSION INTRAVENOUS at 07:50

## 2020-03-05 RX ADMIN — SODIUM CHLORIDE, SODIUM LACTATE, POTASSIUM CHLORIDE, AND CALCIUM CHLORIDE: 600; 310; 30; 20 INJECTION, SOLUTION INTRAVENOUS at 07:30

## 2020-03-05 RX ADMIN — PROPOFOL 100 MG: 10 INJECTION, EMULSION INTRAVENOUS at 07:39

## 2020-03-05 ASSESSMENT — PULMONARY FUNCTION TESTS
PIF_VALUE: 0

## 2020-03-05 ASSESSMENT — PAIN - FUNCTIONAL ASSESSMENT: PAIN_FUNCTIONAL_ASSESSMENT: 0-10

## 2020-03-05 ASSESSMENT — PAIN SCALES - WONG BAKER: WONGBAKER_NUMERICALRESPONSE: 0

## 2020-03-05 ASSESSMENT — PAIN SCALES - GENERAL
PAINLEVEL_OUTOF10: 0
PAINLEVEL_OUTOF10: 0

## 2020-03-05 NOTE — PROGRESS NOTES
Tamiko Ford is a 68 y.o. male patient. Current Facility-Administered Medications   Medication Dose Route Frequency Provider Last Rate Last Dose    sodium chloride flush 0.9 % injection 10 mL  10 mL Intravenous 2 times per day Towana Alley, DO        sodium chloride flush 0.9 % injection 10 mL  10 mL Intravenous PRN Towana Alley, DO        0.9 % sodium chloride infusion   Intravenous Continuous Towana Hitchcock, DO        lactated ringers infusion   Intravenous Continuous Towana Hitchcock, DO         Allergies   Allergen Reactions    Azithromycin Anaphylaxis     Patient states that he is allergic to pretty much every antibiotic. He either breaks out in hives or develops anaphylaxis. Patient tolerates metronidazole (Flagyl).  Brilinta [Ticagrelor] Shortness Of Breath    Sulfa Antibiotics Anaphylaxis    Clindamycin/Lincomycin Hives    Diovan [Valsartan] Hives    Influenza Vaccines      Got flu    Lisinopril Swelling     angioedema    Macrolides And Ketolides     Penicillins      PT advised that he is unable to take any type of ABX, chronic hives.  Atorvastatin Hives and Rash     May 2017: broke out in hives around groin and developed yeast infection     Active Problems:    * No active hospital problems. *  Resolved Problems:    * No resolved hospital problems. *    Blood pressure (!) 158/83, pulse 94, temperature 98 °F (36.7 °C), temperature source Oral, resp. rate 16, height 5' 11\" (1.803 m), weight 194 lb (88 kg), SpO2 94 %. Subjective:  Symptoms:  Stable. Diet:  Adequate intake. Activity level: Normal.    Pain:  He reports no pain. Objective:  General Appearance:  Comfortable, well-appearing, in no acute distress and not in pain. Vital signs: (most recent): Blood pressure (!) 158/83, pulse 94, temperature 98 °F (36.7 °C), temperature source Oral, resp. rate 16, height 5' 11\" (1.803 m), weight 194 lb (88 kg), SpO2 94 %. Vital signs are normal.    Output: Producing urine and producing stool.

## 2020-03-05 NOTE — OP NOTE
[address] [place] [address]  Endoscopy Note    Patient: Angelina Zeng  : 1950  Acct#:     Procedure: Colonoscopy with polypectomy (snare cautery)    Date:  3/5/2020    Surgeon:  Eleln Mendoza MD    Preoperative Diagnosis:  H/o polyps. Large rectal polyp removed a year ago. Postoperative Diagnosis:  Rectal polyp. Anesthesia:  MAC    Indications: This is a 68y.o. year old male who presents today with previous polyp. Procedure: An informed consent was obtained from the patient after explanation of indications, benefits, possible risks and complications of the procedure. The patient was then taken to the endoscopy suite, placed in the left lateral decubitus position, and the above IV anesthesia was administered. A digital rectal examination was performed and revealed negative without mass, lesions or tenderness. The Olympus CFQ-180-AL video colonoscope was placed in the patient's rectum under digital direction and advanced to the cecum. The cecum was identified by characteristic anatomy and ballottment. The prep was fair. The ileocecal valve was identified and intubated     Visualization of the terminal ileum demonstrated normal mucosa. The scope was then withdrawn back through the cecum, ascending, transverse, descending and sigmoid colons. Carefull circumferential examination of the mucosa in these areas demonstrated polyp(s) #1, 5 mm in size, located in the rectum removed by snare cautery and retrieved for pathology. This is te residual/recurrent polyp at the same site as previous polypectomy. Mild diverticulosis noted in the ascending colon  Moderate diverticulosis noted in the left colon. The scope was then withdrawn into the rectum and retroflexed. The retroflexed view of the anal verge and rectum demonstrates no abnormalities. The scope was straightened, the colon was decompressed and the scope was withdrawn from the patient.   The patient tolerated the procedure

## 2020-03-05 NOTE — ANESTHESIA PRE PROCEDURE
Ahsan Kahn MD        oxyCODONE (ROXICODONE) immediate release tablet 5 mg  5 mg Oral PRN Alma Ralph MD        Or    oxyCODONE (ROXICODONE) immediate release tablet 10 mg  10 mg Oral PRN Alma Ralph MD        diphenhydrAMINE (BENADRYL) injection 12.5 mg  12.5 mg Intravenous Once PRN Alma Ralph MD        metoclopramide (REGLAN) injection 10 mg  10 mg Intravenous Once PRN Alma Ralph MD        promethazine [facility]) injection 6.25 mg  6.25 mg Intravenous Once PRN Alma Ralph MD        labetalol (NORMODYNE;TRANDATE) injection syringe 5 mg  5 mg Intravenous Q10 Min PRN Alma Ralph MD        hydrALAZINE (APRESOLINE) injection 5 mg  5 mg Intravenous Q10 Min PRN Alma Ralph MD        meperidine (DEMEROL) injection 12.5 mg  12.5 mg Intravenous Q5 Min PRN Alma Ralph MD           Allergies: Allergies   Allergen Reactions    Azithromycin Anaphylaxis     Patient states that he is allergic to pretty much every antibiotic. He either breaks out in hives or develops anaphylaxis. Patient tolerates metronidazole (Flagyl).  Brilinta [Ticagrelor] Shortness Of Breath    Sulfa Antibiotics Anaphylaxis    Clindamycin/Lincomycin Hives    Diovan [Valsartan] Hives    Influenza Vaccines      Got flu    Lisinopril Swelling     angioedema    Macrolides And Ketolides     Penicillins      PT advised that he is unable to take any type of ABX, chronic hives.  Atorvastatin Hives and Rash     May 2017: broke out in hives around groin and developed yeast infection       Problem List:    Patient Active Problem List   Diagnosis Code    Idiopathic urticaria L50.1    Essential hypertension I10    ED (erectile dysfunction) N52.9    Hyperlipidemia E78.5    Low testosterone R79.89    Hyperglycemia R73.9    Obesity (BMI 30-39. 9) E66.9    Arthritis M19.90    Primary osteoarthritis of left hip M16.12    Osteoarthritis of left hip M16.12    Status post total hip replacement, left 15.00     Types: Cigars     Last attempt to quit: 2018     Years since quittin.0    Smokeless tobacco: Never Used   Substance Use Topics    Alcohol use: No                                Counseling given: Not Answered      Vital Signs (Current):   Vitals:    20 0657   BP: (!) 158/83   Pulse: 94   Resp: 16   Temp: 98 °F (36.7 °C)   TempSrc: Oral   SpO2: 94%   Weight: 194 lb (88 kg)   Height: 5' 11\" (1.803 m)                                              BP Readings from Last 3 Encounters:   20 (!) 158/83   10/29/19 122/76   19 128/62       NPO Status: Time of last liquid consumption:                         Time of last solid consumption: 900                        Date of last liquid consumption: 20                        Date of last solid food consumption: 20    BMI:   Wt Readings from Last 3 Encounters:   20 194 lb (88 kg)   10/29/19 211 lb (95.7 kg)   19 211 lb (95.7 kg)     Body mass index is 27.06 kg/m². CBC:   Lab Results   Component Value Date    WBC 8.4 2019    RBC 5.14 2019    HGB 15.6 2019    HCT 46.2 2019    MCV 89.8 2019    RDW 12.9 2019     2019       CMP:   Lab Results   Component Value Date     2019    K 5.2 2019    K 4.2 2018     2019    CO2 23 2019    BUN 11 2019    CREATININE 0.9 2019    GFRAA >60 2019    GFRAA >60 02/15/2013    AGRATIO 1.7 2019    LABGLOM >60 2019    GLUCOSE 116 2019    PROT 7.5 2019    PROT 7.3 02/15/2013    CALCIUM 10.0 2019    BILITOT 0.5 2019    ALKPHOS 98 2019    AST 20 2019    ALT 21 2019       POC Tests: No results for input(s): POCGLU, POCNA, POCK, POCCL, POCBUN, POCHEMO, POCHCT in the last 72 hours. Coags:   Lab Results   Component Value Date    PROTIME 12.3 2018    INR 1.08 2018    APTT 33.0 2018       HCG (If Applicable):  No

## 2020-04-28 ENCOUNTER — VIRTUAL VISIT (OUTPATIENT)
Dept: RHEUMATOLOGY | Age: 70
End: 2020-04-28
Payer: MEDICARE

## 2020-04-28 PROCEDURE — G8427 DOCREV CUR MEDS BY ELIG CLIN: HCPCS | Performed by: INTERNAL MEDICINE

## 2020-04-28 PROCEDURE — 4040F PNEUMOC VAC/ADMIN/RCVD: CPT | Performed by: INTERNAL MEDICINE

## 2020-04-28 PROCEDURE — 1123F ACP DISCUSS/DSCN MKR DOCD: CPT | Performed by: INTERNAL MEDICINE

## 2020-04-28 PROCEDURE — 99214 OFFICE O/P EST MOD 30 MIN: CPT | Performed by: INTERNAL MEDICINE

## 2020-04-28 PROCEDURE — 3017F COLORECTAL CA SCREEN DOC REV: CPT | Performed by: INTERNAL MEDICINE

## 2020-04-28 RX ORDER — DULOXETIN HYDROCHLORIDE 30 MG/1
30 CAPSULE, DELAYED RELEASE ORAL DAILY
Qty: 30 CAPSULE | Refills: 0 | Status: SHIPPED | OUTPATIENT
Start: 2020-04-28 | End: 2020-05-22

## 2020-04-28 NOTE — PROGRESS NOTES
observation)    Blood pressure-  Heart rate-    Respiratory rate-    Temperature-  Pulse oximetry-     Constitutional: [x] Appears well-developed and well-nourished [x] No apparent distress      [] Abnormal-   Mental status  [x] Alert and awake  [x] Oriented to person/place/time [x]Able to follow commands      Eyes:  EOM    []  Normal  [] Abnormal-  Sclera  []  Normal  [] Abnormal -         Discharge []  None visible  [] Abnormal -    HENT:   [] Normocephalic, atraumatic. [] Abnormal   [] Mouth/Throat: Mucous membranes are moist.     External Ears [] Normal  [] Abnormal-     Neck: [] No visualized mass     Pulmonary/Chest: [] Respiratory effort normal.  [] No visualized signs of difficulty breathing or respiratory distress        [] Abnormal-      Musculoskeletal:   [x] Normal gait with no signs of ataxia         [x] Normal range of motion of neck        [] Abnormal-       Neurological:        [] No Facial Asymmetry (Cranial nerve 7 motor function) (limited exam to video visit)          [] No gaze palsy        [] Abnormal-         Skin:        [] No significant exanthematous lesions or discoloration noted on facial skin         [] Abnormal-            Psychiatric:       [] Normal Affect [] No Hallucinations        [] Abnormal-     Other pertinent observable physical exam findings-     ASSESSMENT/PLAN:  Lux Mandujano was seen today for follow-up. Diagnoses and all orders for this visit:    Generalized osteoarthritis    Other orders  -     DULoxetine (CYMBALTA) 30 MG extended release capsule; Take 1 capsule by mouth daily      Generalized osteoarthritis worsening musculoskeletal discomfort. Increase Cymbalta 30 mg a day. If he is able to tolerate, will call in 90-day supply to mail in pharmacy. May take Tylenol arthritis as needed as well. LFTs with next blood draw. No follow-ups on file.     Chelsea Estes is a 68 y.o. male being evaluated by a Virtual Visit (video visit) encounter to address concerns as mentioned

## 2020-08-28 RX ORDER — DULOXETIN HYDROCHLORIDE 30 MG/1
CAPSULE, DELAYED RELEASE ORAL
Qty: 90 CAPSULE | Refills: 0 | Status: SHIPPED | OUTPATIENT
Start: 2020-08-28 | End: 2020-11-25

## 2020-10-28 ENCOUNTER — VIRTUAL VISIT (OUTPATIENT)
Dept: RHEUMATOLOGY | Age: 70
End: 2020-10-28
Payer: MEDICARE

## 2020-10-28 PROCEDURE — 1036F TOBACCO NON-USER: CPT | Performed by: INTERNAL MEDICINE

## 2020-10-28 PROCEDURE — 1123F ACP DISCUSS/DSCN MKR DOCD: CPT | Performed by: INTERNAL MEDICINE

## 2020-10-28 PROCEDURE — 4040F PNEUMOC VAC/ADMIN/RCVD: CPT | Performed by: INTERNAL MEDICINE

## 2020-10-28 PROCEDURE — G8417 CALC BMI ABV UP PARAM F/U: HCPCS | Performed by: INTERNAL MEDICINE

## 2020-10-28 PROCEDURE — 3017F COLORECTAL CA SCREEN DOC REV: CPT | Performed by: INTERNAL MEDICINE

## 2020-10-28 PROCEDURE — G8428 CUR MEDS NOT DOCUMENT: HCPCS | Performed by: INTERNAL MEDICINE

## 2020-10-28 PROCEDURE — G8484 FLU IMMUNIZE NO ADMIN: HCPCS | Performed by: INTERNAL MEDICINE

## 2020-10-28 PROCEDURE — 99213 OFFICE O/P EST LOW 20 MIN: CPT | Performed by: INTERNAL MEDICINE

## 2020-10-28 NOTE — PROGRESS NOTES
EXAMINATION:  [ INSTRUCTIONS:  \"[x]\" Indicates a positive item  \"[]\" Indicates a negative item  -- DELETE ALL ITEMS NOT EXAMINED]  Vital Signs: (As obtained by patient/caregiver or practitioner observation)    Blood pressure-  Heart rate-    Respiratory rate-    Temperature-  Pulse oximetry-     Constitutional: [x] Appears well-developed and well-nourished [x] No apparent distress      [] Abnormal-   Mental status  [x] Alert and awake  [x] Oriented to person/place/time [x]Able to follow commands      Eyes:  EOM    []  Normal  [] Abnormal-  Sclera  []  Normal  [] Abnormal -         Discharge []  None visible  [] Abnormal -    HENT:   [] Normocephalic, atraumatic. [] Abnormal   [] Mouth/Throat: Mucous membranes are moist.     External Ears [] Normal  [] Abnormal-     Neck: [] No visualized mass     Pulmonary/Chest: [] Respiratory effort normal.  [] No visualized signs of difficulty breathing or respiratory distress        [] Abnormal-      Musculoskeletal:   [x] Normal gait with no signs of ataxia         [x] Normal range of motion of neck        [] Abnormal-       Neurological:        [] No Facial Asymmetry (Cranial nerve 7 motor function) (limited exam to video visit)          [] No gaze palsy        [] Abnormal-         Skin:        [] No significant exanthematous lesions or discoloration noted on facial skin         [] Abnormal-            Psychiatric:       [] Normal Affect [] No Hallucinations        [] Abnormal-     Other pertinent observable physical exam findings-     ASSESSMENT/PLAN:  Ana Hernandez was seen today for follow-up. Diagnoses and all orders for this visit:    Medication monitoring encounter  -     Hepatic Function Panel; Future    Generalized osteoarthritis      Generalized osteoarthritis doing well on 30 mg of Cymbalta. Is due for LFTs. Continue current medications. Follow-up in 6 months. No follow-ups on file.     Nahum Catalan is a 65 y.o. male being evaluated by a Virtual Visit (video visit) encounter to address concerns as mentioned above. A caregiver was present when appropriate. Due to this being a TeleHealth encounter (During [facility] public health emergency), evaluation of the following organ systems was limited: Vitals/Constitutional/EENT/Resp/CV/GI//MS/Neuro/Skin/Heme-Lymph-Imm. Pursuant to the emergency declaration under the [address] [place] [address] and the [employer] Act, this Virtual Visit was conducted with patient's (and/or legal guardian's) consent, to reduce the patient's risk of exposure to COVID-19 and provide necessary medical care. The patient (and/or legal guardian) has also been advised to contact this office for worsening conditions or problems, and seek emergency medical treatment and/or call 911 if deemed necessary. Patient identification was verified at the start of the visit: {YES  Services were provided through a video synchronous discussion virtually to substitute for in-person clinic visit. Patient and provider were located at their individual homes. --Boby Terrell MD on 10/28/2020 at 2:13 PM    An electronic signature was used to authenticate this note.

## 2020-10-30 ENCOUNTER — HOSPITAL ENCOUNTER (OUTPATIENT)
Age: 70
Discharge: HOME | End: 2020-10-30
Payer: MEDICARE

## 2020-10-30 LAB
ALBUMIN SERPL-MCNC: 4.3 G/DL (ref 3.4–5)
ALP BLD-CCNC: 102 U/L (ref 40–129)
ALT SERPL-CCNC: 27 U/L (ref 10–40)
AST SERPL-CCNC: 19 U/L (ref 15–37)
BILIRUB SERPL-MCNC: 0.3 MG/DL (ref 0–1)
BILIRUBIN DIRECT: <0.2 MG/DL (ref 0–0.3)
BILIRUBIN, INDIRECT: NORMAL MG/DL (ref 0–1)
TOTAL PROTEIN: 7.2 G/DL (ref 6.4–8.2)

## 2020-10-30 PROCEDURE — 80076 HEPATIC FUNCTION PANEL: CPT

## 2020-10-30 PROCEDURE — 36415 COLL VENOUS BLD VENIPUNCTURE: CPT

## 2020-11-17 RX ORDER — MONTELUKAST SODIUM 10 MG/1
TABLET ORAL
Qty: 30 TABLET | Refills: 0 | Status: SHIPPED | OUTPATIENT
Start: 2020-11-17 | End: 2021-01-07

## 2020-11-17 RX ORDER — VERAPAMIL HYDROCHLORIDE 240 MG/1
TABLET, FILM COATED, EXTENDED RELEASE ORAL
Qty: 60 TABLET | Refills: 0 | Status: SHIPPED | OUTPATIENT
Start: 2020-11-17 | End: 2021-01-07

## 2021-01-07 DIAGNOSIS — Z00.00 MEDICARE ANNUAL WELLNESS VISIT, SUBSEQUENT: ICD-10-CM

## 2021-01-07 RX ORDER — MONTELUKAST SODIUM 10 MG/1
TABLET ORAL
Qty: 30 TABLET | Refills: 0 | Status: SHIPPED | OUTPATIENT
Start: 2021-01-07 | End: 2021-01-12 | Stop reason: SDUPTHER

## 2021-01-07 RX ORDER — VERAPAMIL HYDROCHLORIDE 240 MG/1
TABLET, FILM COATED, EXTENDED RELEASE ORAL
Qty: 60 TABLET | Refills: 0 | Status: SHIPPED | OUTPATIENT
Start: 2021-01-07 | End: 2021-02-17

## 2021-01-12 ENCOUNTER — TELEPHONE (OUTPATIENT)
Dept: INTERNAL MEDICINE CLINIC | Age: 71
End: 2021-01-12

## 2021-01-12 DIAGNOSIS — Z00.00 MEDICARE ANNUAL WELLNESS VISIT, SUBSEQUENT: ICD-10-CM

## 2021-01-12 RX ORDER — VERAPAMIL HYDROCHLORIDE 240 MG/1
240 CAPSULE, EXTENDED RELEASE ORAL 2 TIMES DAILY
Qty: 180 CAPSULE | Refills: 3 | Status: SHIPPED | OUTPATIENT
Start: 2021-01-12 | End: 2021-02-04 | Stop reason: SDUPTHER

## 2021-01-12 RX ORDER — MONTELUKAST SODIUM 10 MG/1
TABLET ORAL
Qty: 30 TABLET | Refills: 0 | Status: SHIPPED | OUTPATIENT
Start: 2021-01-12 | End: 2021-02-04 | Stop reason: SDUPTHER

## 2021-01-12 NOTE — TELEPHONE ENCOUNTER
Pharmacy called stating the patient was in and has not received his prescriptions from the mail order pharmacy yet.   Can you please send in a prescription for  10 days supply until he gets it in the mail?     verapamil (CALAN SR) 240 MG extended release tablet [[id number]    montelukast (SINGULAIR) 10 MG tablet [[id number]    [address] [address]

## 2021-01-13 ENCOUNTER — OFFICE VISIT (OUTPATIENT)
Dept: ORTHOPEDIC SURGERY | Age: 71
End: 2021-01-13
Payer: MEDICARE

## 2021-01-13 VITALS — HEIGHT: 71 IN | BODY MASS INDEX: 27.16 KG/M2 | WEIGHT: 194 LBS

## 2021-01-13 DIAGNOSIS — M17.12 PRIMARY OSTEOARTHRITIS OF LEFT KNEE: ICD-10-CM

## 2021-01-13 DIAGNOSIS — M25.562 LEFT KNEE PAIN, UNSPECIFIED CHRONICITY: Primary | ICD-10-CM

## 2021-01-13 PROCEDURE — L1812 KO ELASTIC W/JOINTS PRE OTS: HCPCS | Performed by: ORTHOPAEDIC SURGERY

## 2021-01-13 PROCEDURE — 20610 DRAIN/INJ JOINT/BURSA W/O US: CPT | Performed by: ORTHOPAEDIC SURGERY

## 2021-01-13 PROCEDURE — 99213 OFFICE O/P EST LOW 20 MIN: CPT | Performed by: ORTHOPAEDIC SURGERY

## 2021-01-13 NOTE — PROGRESS NOTES
[facility]  Outpatient Progress Note  Emanuel Simms MD    Patient Name: Maya Hilton MRN: [id number]   Age: 65 y.o. YOB: 1950   Sex: male      [address] Complaint   Patient presents with    Pain     LEFT KNEE - HX OF SX SCOPE 14 YEARS AGO       HISTORY OF PRESENT ILLNESS   Maya Hilton is a 65 y.o. male Viktor Fent for evaluation of complaints of left knee pain. He had bilateral knee arthroscopy in the remote past, approximately 14 years ago. The right knee seems to be doing okay but he has intermittent stiffness and discomfort in the left knee. Symptoms seem to be getting worse over the last 3 months. He saw another physician about a month ago who did an intra-articular injection of corticosteroid which was of minimal improvement symptomatically. He presented to the office today wondering if there are other treatment options available to him that did not include total knee replacement.       PAST MEDICAL HISTORY      Past Medical History:   Diagnosis Date    Angioedema     ED (erectile dysfunction) 12/2/2010    History of colon polyps     History of Aj-Barr virus infection     History of rectal polyps     Idiopathic urticaria     MI [facility] infarction) ([facility] [address]     10/22/2017    Osteoarthritis     [facility]     Tear of medial cartilage or meniscus of knee, current     Unspecified essential hypertension        PAST SURGICAL HISTORY     Past Surgical History:   Procedure Laterality Date    APPENDECTOMY      CARDIAC SURGERY      cardiac stents x2    COLONOSCOPY N/A 7/25/2018    COLONOSCOPY WITH BIOPSY OF RECTAL MASS performed by Jeanmarie Hodgkin, MD at [facility] COLONOSCOPY N/A 2/6/2019    COLONOSCOPY POLYPECTOMY hot snare of transverse colon polyp and proximal rectal polyp  performed by Benigno Gallardo MD at [address] [place] [address] N/A 3/5/2020 COLONOSCOPY POLYPECTOMY ABLATION performed by Yoni Garzon MD at [address] [facility] [place]  2017    HIP SURGERY      KNEE ARTHROSCOPY Bilateral     OTHER SURGICAL HISTORY  06/12/2018    LEFT ANTERIOR HIP ARTHROPLASTY MAKOPLASTY    NE COLONOSCOPY FLX DX W/COLLJ SPEC WHEN PFRMD N/A 10/9/2018    COLONOSCOPY WITH COLD FORCEP POLYPECTOMY performed by Didi Bonilla MD at [address] [facility] EGD TRANSORAL BIOPSY SINGLE/MULTIPLE N/A 7/25/2018    EGD ESOPHAGOGASTRODUODENOSCOPY performed by Cheryle Coyer, MD at [address] LESION(S) N/A 10/9/2018    TRANSANAL EXCISION OF RECTAL POLYP, TRANSANAL MINIMALLY INVASIVE SURGERY (TAMIS); EXCISION RECTAL MASS performed by Didi Bonilla MD at [facility]     Current Outpatient Medications   Medication Sig Dispense Refill    verapamil (VERELAN) 240 MG extended release capsule Take 1 capsule by mouth 2 times daily 180 capsule 3    montelukast (SINGULAIR) 10 MG tablet TAKE 1 TABLET EVERY NIGHT 30 tablet 0    verapamil (CALAN SR) 240 MG extended release tablet TAKE 1 TABLET TWICE DAILY (NEED MD APPOINTMENT) 60 tablet 0    DULoxetine (CYMBALTA) 30 MG extended release capsule TAKE 1 CAPSULE EVERY DAY 90 capsule 1    aspirin 81 MG tablet Take 1 tablet by mouth daily      metroNIDAZOLE (FLAGYL) 500 MG tablet TAKE 1 TABLET BY MOUTH TWICE DAILY FOR 3 DAYS 6 tablet 0    TURMERIC PO Take by mouth      Cetirizine HCl 10 MG CAPS Take 1 capsule every day by oral route.  nitroGLYCERIN (NITROSTAT) 0.4 MG SL tablet Place 1 tablet under the tongue as needed       No current facility-administered medications for this visit. ALLERGIES     Allergies   Allergen Reactions    Azithromycin Anaphylaxis     Patient states that he is allergic to pretty much every antibiotic. He either breaks out in hives or develops anaphylaxis. Patient tolerates metronidazole (Flagyl).  Brilinta [Ticagrelor] Shortness Of Breath    Sulfa Antibiotics Anaphylaxis    Clindamycin/Lincomycin Hives    Diovan [Valsartan] Hives    Influenza Vaccines      Got flu    Lisinopril Swelling     angioedema    Macrolides And Ketolides     Penicillins      PT advised that he is unable to take any type of ABX, chronic hives.      Atorvastatin Hives and Rash     May 2017: broke out in hives around groin and developed yeast infection       FAMILY HISTORY     Family History   Problem Relation Age of Onset    Celiac Disease Daughter        SOCIAL HISTORY     Social History     Socioeconomic History    Marital status:      Spouse name: None    Number of children: None    Years of education: None    Highest education level: None   Occupational History    None   Social Needs    Financial resource strain: None    Food insecurity     Worry: None     Inability: None    Transportation needs     Medical: None     Non-medical: None   Tobacco Use    Smoking status: Former Smoker     Packs/day: 1.00     Years: 15.00     Pack years: 15.00     Types: Cigars     Quit date: 2018     Years since quittin.9    Smokeless tobacco: Never Used   Substance and Sexual Activity    Alcohol use: No    Drug use: No    Sexual activity: Yes     Partners: Female   Lifestyle    Physical activity     Days per week: None     Minutes per session: None    Stress: None   Relationships    Social connections     Talks on phone: None     Gets together: None     Attends Bahai service: None     Active member of club or organization: None     Attends meetings of clubs or organizations: None     Relationship status: None    Intimate partner violence     Fear of current or ex partner: None     Emotionally abused: None     Physically abused: None     Forced sexual activity: None   Other Topics Concern    None   Social History Narrative    None       REVIEW OF SYSTEMS Impression: Mild degenerative changes noted    IMPRESSION     1. Left knee pain, unspecified chronicity    2. Primary osteoarthritis of left knee         PLAN   I had a lengthy discussion with patient today regarding diagnosis and treatment options and recommendations. I have recommended some additional nonsurgical treatment options including a knee brace for support, home exercise program for quadriceps and VMO strengthening, topical anti-inflammatory medication and a series of viscosupplementation injections. Patient agrees with the plan of care and we are going to start the viscosupplementation series today. PROCEDURE   Risks, benefits, and alternatives to the injections were discussed in detail with the patient. The risks discussed included but are not limited to infection, skin reactions, hot swollen joints and anaphylaxis. The patient gave verbal informed consent for the injection. His skin was prepped with 3 sterile gauze pads soaked with alocohol solution and the knee joint was injected with Orthovisc intra-articularly under sterile conductions. The patient tolerated the injection reasonably well. He was given instructions to ice the knee and avoid strenuous activities for 24-48 hours. He was instructed to call the office immediately if there is increased pain, redness, warmth, fever or chills. We will see the patient back in one week for his second injection. Otilio Butler MD    FOLLOWUP     Return in about 1 week (around 1/20/2021) for viscosupplementation injections. Orders Placed This Encounter   Procedures    XR KNEE LEFT (3 VIEWS)      No orders of the defined types were placed in this encounter.       Patient was instructed on appropriate use of braces, participation in home exercise programs, healthy lifestyle choices and weight loss as appropriate     Otilio Butler MD

## 2021-01-13 NOTE — PATIENT INSTRUCTIONS
The patient was fitted for a knee brace in the office today. The patient was instructed on proper care and use of the brace. Patient was instructed to contact the office if there are any questions or difficulties with the use of the brace. Joint Injection After [address] [facility]  Ellen Astudillo MD    Refer to this sheet in the next few days. These insructions provide you with information on caring for yourself after you have had a joint injection. Your caregiver also may give you more specific instructions. Your treatment has been planned according to current medical practices, but problems sometimes occur. Call your caregiver if you have any problems or questions after your procedure. After any type of joint injection, it is not uncommon to experience:    ? A temporary increase in pain which should resolve within 2-3 days  ? Soreness, swelling, or bruising around the injection site  ? Mild numbness, tingling, or weakness around the injection site caused by the numbing medicine used before or with the injection    It is also possible to experience the following effects associated with the specific agent after injection:    ? Corticosteroids (these effects are rare):  o Allergic reaction  o Increased blood sugar levels (if you have diabetes and you notice that your blood sugar levels have increased, notify your caregiver)  o Increased blood pressure levels  o Mood swings  ? Hyaluronic acid in the use of viscosupplementation  o Temporary heat or redness  o Temporary rash and itching  o Increased fluid accumulation in the injected join    These effects all should resolve within a day or two after your procedure. HOME CARE INSTRUCTIONS    ? Limit yourself to light activity the day of your procedure. Avoid lifting heavy objections, bending, stooping or twisting  ?  Take prescription or over-the-counter pain medication as directed by your caregiver ? You may apply ice to your injection site to reduce pain and swelling the day of your procedure. Ice may be applied 3-4 times:  o Put ice in a plastic bag  o Place a towel between your skin and the bag  o Leave the ice on for no longer than 15-20 minutes each time    FOLLOW-UP INSTRUCTIONS        Please make sure you keep your follow-up appointment as indicated by your physician. Patient Education        Knee: Exercises  Introduction  Here are some examples of exercises for you to try. The exercises may be suggested for a condition or for rehabilitation. Start each exercise slowly. Ease off the exercises if you start to have pain. You will be told when to start these exercises and which ones will work best for you. How to do the exercises  Quad sets   1. Sit with your leg straight and supported on the floor or a firm bed. (If you feel discomfort in the front or back of your knee, place a small towel roll under your knee.)  2. Tighten the muscles on top of your thigh by pressing the back of your knee flat down to the floor. (If you feel discomfort under your kneecap, place a small towel roll under your knee.)  3. Hold for about 6 seconds, then rest for up to 10 seconds. 4. Do 8 to 12 repetitions several times a day. Straight-leg raises to the front   1. Lie on your back with your good knee bent so that your foot rests flat on the floor. Your injured leg should be straight. Make sure that your low back has a normal curve. You should be able to slip your flat hand in between the floor and the small of your back, with your palm touching the floor and your back touching the back of your hand. 2. Tighten the thigh muscles in the injured leg by pressing the back of your knee flat down to the floor. Hold your knee straight. 3. Keeping the thigh muscles tight, lift your injured leg up so that your heel is about 12 inches off the floor. Hold for about 6 seconds and then lower slowly. 4. Do 8 to 12 repetitions, 3 times a day. Straight-leg raises to the outside   1. Lie on your side, with your injured leg on top. 2. Tighten the front thigh muscles of your injured leg to keep your knee straight. 3. Keep your hip and your leg straight in line with the rest of your body, and keep your knee pointing forward. Do not drop your hip back. 4. Lift your injured leg straight up toward the ceiling, about 12 inches off the floor. Hold for about 6 seconds, then slowly lower your leg. 5. Do 8 to 12 repetitions. Straight-leg raises to the back   1. Lie on your stomach, and lift your leg straight up behind you (toward the ceiling). 2. Lift your toes about 6 inches off the floor, hold for about 6 seconds, then lower slowly. 3. Do 8 to 12 repetitions. Straight-leg raises to the inside   1. Lie on the side of your body with the injured leg. 2. You can either prop your other (good) leg up on a chair, or you can bend your good knee and put that foot in front of your injured knee. Do not drop your hip back. 3. Tighten the muscles on the front of your thigh to straighten your injured knee. 4. Keep your kneecap pointing forward, and lift your whole leg up toward the ceiling about 6 inches. Hold for about 6 seconds, then lower slowly. 5. Do 8 to 12 repetitions. Heel dig bridging   1. Lie on your back with both knees bent and your ankles bent so that only your heels are digging into the floor. Your knees should be bent about 90 degrees. 2. Then push your heels into the floor, squeeze your buttocks, and lift your hips off the floor until your shoulders, hips, and knees are all in a straight line. 3. Hold for about 6 seconds as you continue to breathe normally, and then slowly lower your hips back down to the floor and rest for up to 10 seconds. 4. Do 8 to 12 repetitions.     Hamstring curls 1. Lie on your stomach with your knees straight. If your kneecap is uncomfortable, roll up a washcloth and put it under your leg just above your kneecap. 2. Lift the foot of your injured leg by bending the knee so that you bring the foot up toward your buttock. If this motion hurts, try it without bending your knee quite as far. This may help you avoid any painful motion. 3. Slowly lower your leg back to the floor. 4. Do 8 to 12 repetitions. 5. With permission from your doctor or physical therapist, you may also want to add a cuff weight to your ankle (not more than 5 pounds). With weight, you do not have to lift your leg more than 12 inches to get a hamstring workout. Shallow standing knee bends   Do this exercise only if you have very little pain; if you have no clicking, locking, or giving way if you have an injured knee; and if it does not hurt while you are doing 8 to 12 repetitions. 1. Stand with your hands lightly resting on a counter or chair in front of you. Put your feet shoulder-width apart. 2. Slowly bend your knees so that you squat down like you are going to sit in a chair. Make sure your knees do not go in front of your toes. 3. Lower yourself about 6 inches. Your heels should remain on the floor at all times. 4. Rise slowly to a standing position. Heel raises   1. Stand with your feet a few inches apart, with your hands lightly resting on a counter or chair in front of you. 2. Slowly raise your heels off the floor while keeping your knees straight. 3. Hold for about 6 seconds, then slowly lower your heels to the floor. 4. Do 8 to 12 repetitions several times during the day. Follow-up care is a key part of your treatment and safety. Be sure to make and go to all appointments, and call your doctor if you are having problems. It's also a good idea to know your test results and keep a list of the medicines you take. Where can you learn more? Go to [email or web address] and sign in to your [employer] account. Enter H206 in the [place] box to learn more about \"Knee: Exercises. \"     If you do not have an account, please click on the \"Sign Up Now\" link. Current as of: March 2, 2020               Content Version: 12.6  © 8846-2839 [employer]. Care instructions adapted under license by [facility] ([facility]). If you have questions about a medical condition or this instruction, always ask your healthcare professional. [address] any warranty or liability for your use of this information.

## 2021-01-20 ENCOUNTER — OFFICE VISIT (OUTPATIENT)
Dept: ORTHOPEDIC SURGERY | Age: 71
End: 2021-01-20
Payer: MEDICARE

## 2021-01-20 VITALS — WEIGHT: 194 LBS | HEIGHT: 71 IN | BODY MASS INDEX: 27.16 KG/M2

## 2021-01-20 DIAGNOSIS — M17.12 PRIMARY OSTEOARTHRITIS OF LEFT KNEE: Primary | ICD-10-CM

## 2021-01-20 PROCEDURE — 20610 DRAIN/INJ JOINT/BURSA W/O US: CPT | Performed by: ORTHOPAEDIC SURGERY

## 2021-01-20 NOTE — PROGRESS NOTES
[facility]  Outpatient Progress Note  Pooja Astudillo MD    Patient Name: Gilberto Jurado MRN: [id number]   Age: 65 y.o. YOB: 1950   Sex: male      [address] Complaint   Patient presents with    Knee Pain     CK LEFT KNEE-#[address]       HISTORY OF PRESENT ILLNESS   Gilberto Jurado is a 65 y.o. male who returns today for followup for his second Orthovisc injection. Good pain relief was obtained after the prior injection was performed. PROCEDURE   The patient returns today for his second visco injection in his left knee(s). The patient returns today for their second visco injection. The risks, benefits, and complications of the injections were again discussed in detail with the patient. The risks discussed included but are not limited to infection, skin reactions, hot swollen joints, and anaphylaxis. The patient gave verbal informed consent for the injection. The patient skin was prepped with 3 sterile gauze pads soaked with alcohol solution and the knee joint was injected with Orthovisc intra-articularly under sterile conditions . The patient tolerated the injection reasonably well. The patient was given instructions to ice the the knee and avoid strenuous activities for 24-48 hours. The patient was instructed to call the office immediately if there is increased pain, redness, warmth, fever, or chills. We will see the patient back in one week for the third injection.     Leo Whatley MD

## 2021-01-27 ENCOUNTER — OFFICE VISIT (OUTPATIENT)
Dept: ORTHOPEDIC SURGERY | Age: 71
End: 2021-01-27
Payer: MEDICARE

## 2021-01-27 VITALS — HEIGHT: 70 IN | WEIGHT: 194 LBS | BODY MASS INDEX: 27.77 KG/M2

## 2021-01-27 DIAGNOSIS — M17.12 PRIMARY OSTEOARTHRITIS OF LEFT KNEE: Primary | ICD-10-CM

## 2021-01-27 PROCEDURE — 20610 DRAIN/INJ JOINT/BURSA W/O US: CPT | Performed by: ORTHOPAEDIC SURGERY

## 2021-01-27 NOTE — PROGRESS NOTES
[address] [facility]  Outpatient Progress Note  Niall Hitchcock MD    Patient Name: Sharad Carlisle MRN: [id number]   Age: 65 y.o. YOB: 1950   Sex: male      [address] Complaint   Patient presents with    Injections     3rd ORTHOVISC LEFT KNEE       HISTORY OF PRESENT ILLNESS   Sharad Carlisle is a 65 y.o. male who returns today for followup for his third Orthovisc injection. Good pain relief was obtained after the prior injection was performed. PROCEDURE   The patient returns today for his third and final visco injection in his left knee(s). The risks, benefits, and complications of the injections were again discussed in detail with the patient. The risks discussed included but are not limited to infection, skin reactions, hot swollen joints, and anaphylaxis. The patient gave verbal informed consent for the injection. The patient skin was prepped with 3 sterile gauze pads soaked with alcohol solution and the knee joint was injected with Orthovisc intra-articularly under sterile conditions . The patient tolerated the injection reasonably well. The patient was given instructions to ice the the knee and avoid strenuous activities for 24-48 hours. The patient was instructed to call the office immediately if there is increased pain, redness, warmth, fever, or chills. We will see the patient back in one week for the third injection. If this was the patient's first series of injections, he will follow up with me in 4-6 weeks as needed. If multiple series have been done in the past, he will follow up when symptoms return.      Chava Long MD

## 2021-02-04 ENCOUNTER — TELEPHONE (OUTPATIENT)
Dept: INTERNAL MEDICINE CLINIC | Age: 71
End: 2021-02-04

## 2021-02-04 DIAGNOSIS — Z00.00 MEDICARE ANNUAL WELLNESS VISIT, SUBSEQUENT: ICD-10-CM

## 2021-02-04 RX ORDER — MONTELUKAST SODIUM 10 MG/1
TABLET ORAL
Qty: 90 TABLET | Refills: 0 | Status: SHIPPED | OUTPATIENT
Start: 2021-02-04 | End: 2022-04-22

## 2021-02-04 RX ORDER — VERAPAMIL HYDROCHLORIDE 240 MG/1
240 CAPSULE, EXTENDED RELEASE ORAL 2 TIMES DAILY
Qty: 180 CAPSULE | Refills: 0 | Status: SHIPPED | OUTPATIENT
Start: 2021-02-04

## 2021-02-04 NOTE — TELEPHONE ENCOUNTER
Patient called to have montelukast (SINGULAIR) 10 MG tablet and verapamil (VERELAN) 240 MG extended release capsule refilled. Patient is requesting to have a 90 day supply order, since as soon as he gets his medications he has to call back in so he not completely out. [address] [place] [address] [phone] Sariah Nicole [phone]    Please advise.

## 2021-02-17 RX ORDER — VERAPAMIL HYDROCHLORIDE 240 MG/1
TABLET, FILM COATED, EXTENDED RELEASE ORAL
Qty: 60 TABLET | Refills: 0 | Status: SHIPPED | OUTPATIENT
Start: 2021-02-17

## 2021-02-17 NOTE — TELEPHONE ENCOUNTER
Tablets was sent to the pharmacy for the patient. Also patient needs an appointment before any further refills.

## 2021-02-17 NOTE — TELEPHONE ENCOUNTER
Zara Merlin is out of refills for prescription   verapamil (VERELAN) 240 MG extended release capsule [[id number]    Patient states he needs the tablet because the capsules cost 300 dollars he states he is out of medication and needs it filled as soon as possible.      [address] [address]   Phone:  [phone]  Fax:  [phone]

## 2021-03-16 RX ORDER — VERAPAMIL HYDROCHLORIDE 240 MG/1
TABLET, FILM COATED, EXTENDED RELEASE ORAL
Qty: 60 TABLET | Refills: 0 | OUTPATIENT
Start: 2021-03-16

## 2021-06-10 ENCOUNTER — OFFICE VISIT (OUTPATIENT)
Dept: RHEUMATOLOGY | Age: 71
End: 2021-06-10
Payer: MEDICARE

## 2021-06-10 VITALS
DIASTOLIC BLOOD PRESSURE: 74 MMHG | SYSTOLIC BLOOD PRESSURE: 120 MMHG | HEIGHT: 70 IN | BODY MASS INDEX: 27.77 KG/M2 | WEIGHT: 194 LBS

## 2021-06-10 DIAGNOSIS — Z51.81 MEDICATION MONITORING ENCOUNTER: ICD-10-CM

## 2021-06-10 DIAGNOSIS — M15.9 GENERALIZED OSTEOARTHRITIS: Primary | ICD-10-CM

## 2021-06-10 LAB
A/G RATIO: 1.7 (ref 1.1–2.2)
ALBUMIN SERPL-MCNC: 4.7 G/DL (ref 3.4–5)
ALP BLD-CCNC: 113 U/L (ref 40–129)
ALT SERPL-CCNC: 22 U/L (ref 10–40)
ANION GAP SERPL CALCULATED.3IONS-SCNC: 12 MMOL/L (ref 3–16)
AST SERPL-CCNC: 19 U/L (ref 15–37)
BILIRUB SERPL-MCNC: 0.6 MG/DL (ref 0–1)
BUN BLDV-MCNC: 10 MG/DL (ref 7–20)
CALCIUM SERPL-MCNC: 9.6 MG/DL (ref 8.3–10.6)
CHLORIDE BLD-SCNC: 104 MMOL/L (ref 99–110)
CO2: 23 MMOL/L (ref 21–32)
CREAT SERPL-MCNC: 0.9 MG/DL (ref 0.8–1.3)
GFR AFRICAN AMERICAN: >60
GFR NON-AFRICAN AMERICAN: >60
GLOBULIN: 2.7 G/DL
GLUCOSE BLD-MCNC: 125 MG/DL (ref 70–99)
POTASSIUM SERPL-SCNC: 4.5 MMOL/L (ref 3.5–5.1)
SODIUM BLD-SCNC: 139 MMOL/L (ref 136–145)
TOTAL PROTEIN: 7.4 G/DL (ref 6.4–8.2)

## 2021-06-10 PROCEDURE — 3017F COLORECTAL CA SCREEN DOC REV: CPT | Performed by: INTERNAL MEDICINE

## 2021-06-10 PROCEDURE — 36415 COLL VENOUS BLD VENIPUNCTURE: CPT | Performed by: INTERNAL MEDICINE

## 2021-06-10 PROCEDURE — G8427 DOCREV CUR MEDS BY ELIG CLIN: HCPCS | Performed by: INTERNAL MEDICINE

## 2021-06-10 PROCEDURE — 1036F TOBACCO NON-USER: CPT | Performed by: INTERNAL MEDICINE

## 2021-06-10 PROCEDURE — 99213 OFFICE O/P EST LOW 20 MIN: CPT | Performed by: INTERNAL MEDICINE

## 2021-06-10 PROCEDURE — G8417 CALC BMI ABV UP PARAM F/U: HCPCS | Performed by: INTERNAL MEDICINE

## 2021-06-10 PROCEDURE — 4040F PNEUMOC VAC/ADMIN/RCVD: CPT | Performed by: INTERNAL MEDICINE

## 2021-06-10 PROCEDURE — 1123F ACP DISCUSS/DSCN MKR DOCD: CPT | Performed by: INTERNAL MEDICINE

## 2021-06-10 RX ORDER — DULOXETIN HYDROCHLORIDE 30 MG/1
30 CAPSULE, DELAYED RELEASE ORAL DAILY
Qty: 90 CAPSULE | Refills: 1 | Status: SHIPPED | OUTPATIENT
Start: 2021-06-10 | End: 2021-12-10 | Stop reason: SINTOL

## 2021-06-10 NOTE — PROGRESS NOTES
the management plan. I reviewed patient's history, referral documents and electronic medical records. #######################################################################    [id number] for generalized osteoarthritis and chronic musculoskeletal pain. Other medical conditions- CAD, idiopathic angioedema. Nat Horvath is doing fairly well on Cymbalta 30 mg a day in terms of fatigue and generalized pain. Most symptomatic areas are both knees, lower back-followed by orthopedics, recently had a viscosupplementation series with some improvement in the symptoms. At times, he has difficulty in walking from parking lot to the store, therefore requesting disability parking sticker. Tolerating medications well. No GI upset, excessive sweating, irritability or mood problems. All other review of systems are negative.     Past Medical History:   Diagnosis Date    Angioedema     ED (erectile dysfunction) 12/2/2010    History of colon polyps     History of Aj-Barr virus infection     History of rectal polyps     Idiopathic urticaria     MI [facility] infarction) ([facility] [address]     10/22/2017    Osteoarthritis     [facility]     Tear of medial cartilage or meniscus of knee, current     Unspecified essential hypertension      Past Surgical History:   Procedure Laterality Date    APPENDECTOMY      CARDIAC SURGERY      cardiac stents x2    COLONOSCOPY N/A 7/25/2018    COLONOSCOPY WITH BIOPSY OF RECTAL MASS performed by Dru De La Garza MD at [facility] COLONOSCOPY N/A 2/6/2019    COLONOSCOPY POLYPECTOMY hot snare of transverse colon polyp and proximal rectal polyp  performed by Hortencia Villarreal MD at [address] [facility] N/A 3/5/2020    COLONOSCOPY POLYPECTOMY ABLATION performed by Hortencia Villarreal MD at [address] [place] [address]  2017    HIP SURGERY      KNEE ARTHROSCOPY Bilateral     OTHER SURGICAL HISTORY 06/12/2018    LEFT ANTERIOR HIP ARTHROPLASTY MAKOPLASTY    [place] COLONOSCOPY FLX DX W/COLLJ SPEC WHEN PFRMD N/A 10/9/2018    COLONOSCOPY WITH COLD FORCEP POLYPECTOMY performed by Iftikhar Pina MD at [address] [facility] EGD TRANSORAL BIOPSY SINGLE/MULTIPLE N/A 7/25/2018    EGD ESOPHAGOGASTRODUODENOSCOPY performed by Brissa Sosa MD at [address] [facility] LESION(S) N/A 10/9/2018    TRANSANAL EXCISION OF RECTAL POLYP, TRANSANAL MINIMALLY INVASIVE SURGERY (TAMIS); EXCISION RECTAL MASS performed by Iftikhar Pina MD at [address]         No family history of autoimmune diseases    Current Outpatient Medications   Medication Sig Dispense Refill    DULoxetine (CYMBALTA) 30 MG extended release capsule Take 1 capsule by mouth daily 90 capsule 1    verapamil (CALAN SR) 240 MG extended release tablet TAKE 1 TABLET TWICE DAILY (NEED MD APPOINTMENT) 60 tablet 0    verapamil (VERELAN) 240 MG extended release capsule Take 1 capsule by mouth 2 times daily 180 capsule 0    montelukast (SINGULAIR) 10 MG tablet TAKE 1 TABLET EVERY NIGHT 90 tablet 0    DULoxetine (CYMBALTA) 30 MG extended release capsule TAKE 1 CAPSULE EVERY DAY 90 capsule 1    aspirin 81 MG tablet Take 1 tablet by mouth daily      metroNIDAZOLE (FLAGYL) 500 MG tablet TAKE 1 TABLET BY MOUTH TWICE DAILY FOR 3 DAYS 6 tablet 0    TURMERIC PO Take by mouth      Cetirizine HCl 10 MG CAPS Take 1 capsule every day by oral route.  nitroGLYCERIN (NITROSTAT) 0.4 MG SL tablet Place 1 tablet under the tongue as needed       No current facility-administered medications for this visit. Allergies   Allergen Reactions    Azithromycin Anaphylaxis     Patient states that he is allergic to pretty much every antibiotic. He either breaks out in hives or develops anaphylaxis. Patient tolerates metronidazole (Flagyl).     Brilinta [Ticagrelor] Shortness Of Breath    Sulfa Antibiotics Anaphylaxis    Clindamycin/Lincomycin Hives    Diovan [Valsartan] Hives  Influenza Vaccines      Got flu    Lisinopril Swelling     angioedema    Macrolides And Ketolides     Penicillins      PT advised that he is unable to take any type of ABX, chronic hives.  Atorvastatin Hives and Rash     May 2017: broke out in hives around groin and developed yeast infection       PHYSICAL EXAM:    Vitals:    /74   Ht 5' 10\" (1.778 m)   Wt 194 lb (88 kg)   BMI 27.84 kg/m²   General appearance/ Psychiatric: well nourished, and well groomed, normal judgement, alert, appears stated age and cooperative. MKS: No appreciable tender, swollen or inflamed joints in upper or lower extremities, asymptomatic OA changes unchanged in his finger joints, shoulders, hips and knees. No focal spine tenderness. Paraspinal muscle discomfort. Normal gait, muscle strength in upper and lower extremities. Skin: No rashes, no induration or skin thickening or nodules. No evidence ischemia or deformities noted in digits or nails. DATA:   Lab Results   Component Value Date    WBC 8.4 09/26/2019    HGB 15.6 09/26/2019    HCT 46.2 09/26/2019    MCV 89.8 09/26/2019     09/26/2019         Chemistry        Component Value Date/Time     09/26/2019 1018    K 5.2 (H) 09/26/2019 1018    K 4.2 07/27/2018 0640     09/26/2019 1018    CO2 23 09/26/2019 1018    BUN 11 09/26/2019 1018    CREATININE 0.9 09/26/2019 1018        Component Value Date/Time    CALCIUM 10.0 09/26/2019 1018    ALKPHOS 102 10/30/2020 1018    AST 19 10/30/2020 1018    ALT 27 10/30/2020 1018    BILITOT 0.3 10/30/2020 1018           Lab Results   Component Value Date    VITD25 32.8 12/06/2010       A/P- See above.

## 2021-06-10 NOTE — LETTER
[facility] Rheumatology  [address] [place] [address]   [address] [place] [address]  Phone: [phone]  Fax: [phone]    Elder Darling MD         Rachel 10, 2021     Patient: Cassie Lopez   YOB: 1950   Date of Visit: 6/10/2021       To Whom It May Concern: It is my medical opinion that Herrera Speaker requires a disability parking placard for the following reasons:  He has limited walking ability due to an arthritic condition. Duration of need: permanent    If you have any questions or concerns, please don't hesitate to call.     Sincerely,        Elder Darling MD

## 2021-12-10 ENCOUNTER — VIRTUAL VISIT (OUTPATIENT)
Dept: RHEUMATOLOGY | Age: 71
End: 2021-12-10
Payer: MEDICARE

## 2021-12-10 DIAGNOSIS — M15.9 GENERALIZED OSTEOARTHRITIS: Primary | ICD-10-CM

## 2021-12-10 PROCEDURE — 4040F PNEUMOC VAC/ADMIN/RCVD: CPT | Performed by: INTERNAL MEDICINE

## 2021-12-10 PROCEDURE — G8427 DOCREV CUR MEDS BY ELIG CLIN: HCPCS | Performed by: INTERNAL MEDICINE

## 2021-12-10 PROCEDURE — 99213 OFFICE O/P EST LOW 20 MIN: CPT | Performed by: INTERNAL MEDICINE

## 2021-12-10 PROCEDURE — 1123F ACP DISCUSS/DSCN MKR DOCD: CPT | Performed by: INTERNAL MEDICINE

## 2021-12-10 PROCEDURE — 3017F COLORECTAL CA SCREEN DOC REV: CPT | Performed by: INTERNAL MEDICINE

## 2021-12-10 NOTE — PROGRESS NOTES
[address], [place]                                                           [address], [address] [facility]                                                             [phone] (R) [phone] (F)      Dear Dr. Halle Vallejo MD:  Please find Rheumatology assessment. Thank you for giving me the opportunity to be involved in [facility] care and I look forward following Cindy Richardson along with you. If you have any questions or concerns please feel free to reach me. Note is transcribed using voice recognition software. Inadvertent computerized transcription errors may be present. Patient identification: Gudelia Hipps: [birth year],21 y.o. Sex: male     A/P  Cindy Richardson was seen today for follow-up. Diagnoses and all orders for this visit:    Generalized osteoarthritis    Chronic noninflammatory musculoskeletal pain-from osteoarthritis. Manageable without any medications. Patient self discontinued Cymbalta in summer 2021-had black stool, thought that it was from Cymbalta. Cymbalta is unlikely be the cause, has been on it for over 2 years. Nonetheless, if he is able to manage his symptoms without any medications, no need for Cymbalta. Unable to take NSAIDs because of coronary artery disease and history of myocardial infarction. History of angioedema, multiple environmental triggers. Plan-  Advised patient to take Tylenol arthritis in the knee pain. No need to resume Cymbalta. Physical activities, keeping weight under control will also help with noninflammatory musculoskeletal pain. Call me with worsening symptoms, follow-up in 6 months as needed    Patient indicates understanding and agrees with the management plan.   I reviewed patient's history, referral documents and electronic medical records. #######################################################################    Rpndppmmue-[id number]-bp for generalized osteoarthritis and chronic musculoskeletal pain. Other medical conditions- CAD, idiopathic angioedema. Interval changes-states that he noticed black stool in July of this year, was not sure what was causing it, and discontinued duloxetine. Did not have any more black stools. He firmly believes that Cymbalta caused GI bleed. Fortunately, he is able to manage his symptoms with Tylenol arthritis. Osteoarthritic symptoms are mild and manageable. No swollen or inflamed joints. All other review of systems are negative.     Past Medical History:   Diagnosis Date    Angioedema     ED (erectile dysfunction) 12/2/2010    History of colon polyps     History of Aj-Barr virus infection     History of rectal polyps     Idiopathic urticaria     MI ([facility] infarction) ([facility] [address]     10/22/2017    Osteoarthritis     [facility]     Tear of medial cartilage or meniscus of knee, current     Unspecified essential hypertension      Past Surgical History:   Procedure Laterality Date    APPENDECTOMY      CARDIAC SURGERY      cardiac stents x2    COLONOSCOPY N/A 7/25/2018    COLONOSCOPY WITH BIOPSY OF RECTAL MASS performed by Marcelle Nieves MD at [facility] COLONOSCOPY N/A 2/6/2019    COLONOSCOPY POLYPECTOMY hot snare of transverse colon polyp and proximal rectal polyp  performed by Anushka Bradley MD at [address] [facility] N/A 3/5/2020    COLONOSCOPY POLYPECTOMY ABLATION performed by Anushka Bradley MD at [address] [facility]  2017    HIP SURGERY      KNEE ARTHROSCOPY Bilateral     OTHER SURGICAL HISTORY  06/12/2018    LEFT ANTERIOR HIP ARTHROPLASTY MAKOPLASTY    [place] COLONOSCOPY FLX DX W/COLLJ SPEC WHEN PFRMD N/A 10/9/2018    COLONOSCOPY WITH COLD FORCEP POLYPECTOMY performed by Ruben Oliver MD at [address] [facility] EGD TRANSORAL BIOPSY SINGLE/MULTIPLE N/A 7/25/2018    EGD ESOPHAGOGASTRODUODENOSCOPY performed by Rashad Márquez MD at [address] LESION(S) N/A 10/9/2018    TRANSANAL EXCISION OF RECTAL POLYP, TRANSANAL MINIMALLY INVASIVE SURGERY (TAMIS); EXCISION RECTAL MASS performed by Ruben Oliver MD at [address]         No family history of autoimmune diseases    Current Outpatient Medications   Medication Sig Dispense Refill    verapamil (CALAN SR) 240 MG extended release tablet TAKE 1 TABLET TWICE DAILY (NEED MD APPOINTMENT) 60 tablet 0    verapamil (VERELAN) 240 MG extended release capsule Take 1 capsule by mouth 2 times daily 180 capsule 0    montelukast (SINGULAIR) 10 MG tablet TAKE 1 TABLET EVERY NIGHT 90 tablet 0    aspirin 81 MG tablet Take 1 tablet by mouth daily      metroNIDAZOLE (FLAGYL) 500 MG tablet TAKE 1 TABLET BY MOUTH TWICE DAILY FOR 3 DAYS 6 tablet 0    TURMERIC PO Take by mouth      Cetirizine HCl 10 MG CAPS Take 1 capsule every day by oral route.  nitroGLYCERIN (NITROSTAT) 0.4 MG SL tablet Place 1 tablet under the tongue as needed       No current facility-administered medications for this visit. Allergies   Allergen Reactions    Azithromycin Anaphylaxis     Patient states that he is allergic to pretty much every antibiotic. He either breaks out in hives or develops anaphylaxis. Patient tolerates metronidazole (Flagyl).  Brilinta [Ticagrelor] Shortness Of Breath    Sulfa Antibiotics Anaphylaxis    Clindamycin/Lincomycin Hives    Diovan [Valsartan] Hives    Influenza Vaccines      Got flu    Lisinopril Swelling     angioedema    Macrolides And Ketolides     Penicillins      PT advised that he is unable to take any type of ABX, chronic hives.  Atorvastatin Hives and Rash     May 2017: broke out in hives around groin and developed yeast infection       PHYSICAL EXAM:    Vitals:     There were no vitals taken for this visit. General appearance/ Psychiatric: well nourished, and well groomed, normal judgement, alert, appears stated age and cooperative. No findings to be appreciated in VS.      DATA:   Lab Results   Component Value Date    WBC 8.4 09/26/2019    HGB 15.6 09/26/2019    HCT 46.2 09/26/2019    MCV 89.8 09/26/2019     09/26/2019         Chemistry        Component Value Date/Time     06/10/2021 0936    K 4.5 06/10/2021 0936    K 4.2 07/27/2018 0640     06/10/2021 0936    CO2 23 06/10/2021 0936    BUN 10 06/10/2021 0936    CREATININE 0.9 06/10/2021 0936        Component Value Date/Time    CALCIUM 9.6 06/10/2021 0936    ALKPHOS 113 06/10/2021 0936    AST 19 06/10/2021 0936    ALT 22 06/10/2021 0936    BILITOT 0.6 06/10/2021 0936           Lab Results   Component Value Date    VITD25 32.8 12/06/2010       A/P- See above. Nel Concepcion, was evaluated through a synchronous (real-time) audio-video encounter. The patient (or guardian if applicable) is aware that this is a billable service. Verbal consent to proceed has been obtained within the past 12 months. The visit was conducted pursuant to the emergency declaration under the [address] [place] [address], [address] [place] [address] authority and the [employer] General Act. Patient identification was verified, and a caregiver was present when appropriate. The patient was located in a state where the provider was credentialed to provide care. Total time spent for this encounter:23 mint    --Glenn Dee MD on 12/10/2021 at 12:15 PM    An electronic signature was used to authenticate this note.

## 2022-04-22 ENCOUNTER — OFFICE VISIT (OUTPATIENT)
Dept: ORTHOPEDIC SURGERY | Age: 72
End: 2022-04-22
Payer: MEDICARE

## 2022-04-22 VITALS — HEIGHT: 70 IN | BODY MASS INDEX: 27.77 KG/M2 | WEIGHT: 194 LBS

## 2022-04-22 DIAGNOSIS — M17.11 PRIMARY OSTEOARTHRITIS OF RIGHT KNEE: Primary | ICD-10-CM

## 2022-04-22 DIAGNOSIS — M17.12 PRIMARY OSTEOARTHRITIS OF LEFT KNEE: ICD-10-CM

## 2022-04-22 PROCEDURE — G8427 DOCREV CUR MEDS BY ELIG CLIN: HCPCS | Performed by: ORTHOPAEDIC SURGERY

## 2022-04-22 PROCEDURE — G8417 CALC BMI ABV UP PARAM F/U: HCPCS | Performed by: ORTHOPAEDIC SURGERY

## 2022-04-22 PROCEDURE — 1036F TOBACCO NON-USER: CPT | Performed by: ORTHOPAEDIC SURGERY

## 2022-04-22 PROCEDURE — 4040F PNEUMOC VAC/ADMIN/RCVD: CPT | Performed by: ORTHOPAEDIC SURGERY

## 2022-04-22 PROCEDURE — 20610 DRAIN/INJ JOINT/BURSA W/O US: CPT | Performed by: ORTHOPAEDIC SURGERY

## 2022-04-22 PROCEDURE — 1123F ACP DISCUSS/DSCN MKR DOCD: CPT | Performed by: ORTHOPAEDIC SURGERY

## 2022-04-22 PROCEDURE — 3017F COLORECTAL CA SCREEN DOC REV: CPT | Performed by: ORTHOPAEDIC SURGERY

## 2022-04-22 PROCEDURE — 99214 OFFICE O/P EST MOD 30 MIN: CPT | Performed by: ORTHOPAEDIC SURGERY

## 2022-04-22 RX ORDER — MONTELUKAST SODIUM 10 MG/1
10 TABLET ORAL NIGHTLY
COMMUNITY
Start: 2021-04-30

## 2022-04-22 RX ORDER — DULOXETIN HYDROCHLORIDE 30 MG/1
CAPSULE, DELAYED RELEASE ORAL
COMMUNITY
End: 2022-09-09 | Stop reason: ALTCHOICE

## 2022-04-22 RX ORDER — LIDOCAINE HYDROCHLORIDE 10 MG/ML
4 INJECTION, SOLUTION INFILTRATION; PERINEURAL
COMMUNITY

## 2022-04-22 NOTE — PROGRESS NOTES
[facility]  Outpatient Progress Note  Joseph Davison MD    Patient Name: Samantha Villalba MRN: [id number]   Age: 66 y.o. YOB: 1950   Sex: male      [address] Complaint   Patient presents with    Knee Pain     Bilateral knees rt greater than lt no recent imaging visco lt knee 1/2021       HISTORY OF PRESENT ILLNESS   Samantha Villalba is a 66 y.o. male with history of osteoarthritis presents the office today for evaluation of bilateral knee pain. He had a series of viscosupplementation injections a year and a half ago in the left knee which he states was very successful in alleviating pain symptoms. He has had gradual return of pain bilaterally. He is followed by Dr. Ady Holman from rheumatology. He is managing pain well with Tylenol at this point.     Pain Assessment  Location of Pain: Knee  Location Modifiers: Left,Right  Quality of Pain: Sharp,Dull,Aching  Duration of Pain: Persistent  Frequency of Pain: Intermittent  Aggravating Factors: Standing,Walking,Bending,Straightening,Exercise,Stairs  Limiting Behavior: Yes  Relieving Factors: Rest,Nsaids,Exercise    PAST MEDICAL HISTORY      Past Medical History:   Diagnosis Date    Angioedema     ED (erectile dysfunction) 12/2/2010    History of colon polyps     History of Aj-Barr virus infection     History of rectal polyps     Idiopathic urticaria     MI [facility] infarction) [facility] [address]     10/22/2017    Osteoarthritis     RA (rheumatoid arthritis) ([facility] [address]     Tear of medial cartilage or meniscus of knee, current     Unspecified essential hypertension        PAST SURGICAL HISTORY     Past Surgical History:   Procedure Laterality Date    APPENDECTOMY      CARDIAC SURGERY      cardiac stents x2    COLONOSCOPY N/A 7/25/2018    COLONOSCOPY WITH BIOPSY OF RECTAL MASS performed by Abrahan Hernandez MD at [facility] COLONOSCOPY N/A 2/6/2019    COLONOSCOPY POLYPECTOMY hot snare of transverse colon polyp and proximal rectal polyp  performed by Jacky Harley MD at [address] N/A 3/5/2020    COLONOSCOPY POLYPECTOMY ABLATION performed by Jacky Harley MD at [facility]  2017    HIP SURGERY      KNEE ARTHROSCOPY Bilateral     OTHER SURGICAL HISTORY  06/12/2018    LEFT ANTERIOR HIP ARTHROPLASTY MAKOPLASTY    MI COLONOSCOPY FLX DX W/COLLJ SPEC WHEN PFRMD N/A 10/9/2018    COLONOSCOPY WITH COLD FORCEP POLYPECTOMY performed by Deepika Hart MD at [facility] EGD TRANSORAL BIOPSY SINGLE/MULTIPLE N/A 7/25/2018    EGD ESOPHAGOGASTRODUODENOSCOPY performed by Yogesh Martinez MD at [facility] LESION(S) N/A 10/9/2018    TRANSANAL EXCISION OF RECTAL POLYP, TRANSANAL MINIMALLY INVASIVE SURGERY (TAMIS); EXCISION RECTAL MASS performed by Deepika Hart MD at [facility]     Current Outpatient Medications   Medication Sig Dispense Refill    DULoxetine (CYMBALTA) 30 MG extended release capsule duloxetine 30 mg capsule,delayed release   Take 1 capsule every day by oral route for 90 days.  montelukast (SINGULAIR) 10 MG tablet Take 10 mg by mouth nightly      lidocaine 1 % injection 4 mLs      verapamil (CALAN SR) 240 MG extended release tablet TAKE 1 TABLET TWICE DAILY (NEED MD APPOINTMENT) 60 tablet 0    verapamil (VERELAN) 240 MG extended release capsule Take 1 capsule by mouth 2 times daily 180 capsule 0    aspirin 81 MG tablet Take 1 tablet by mouth daily      metroNIDAZOLE (FLAGYL) 500 MG tablet TAKE 1 TABLET BY MOUTH TWICE DAILY FOR 3 DAYS 6 tablet 0    TURMERIC PO Take by mouth      Cetirizine HCl 10 MG CAPS Take 1 capsule every day by oral route.  nitroGLYCERIN (NITROSTAT) 0.4 MG SL tablet Place 1 tablet under the tongue as needed       No current facility-administered medications for this visit.        ALLERGIES     Allergies   Allergen Reactions    Azithromycin Anaphylaxis Patient states that he is allergic to pretty much every antibiotic. He either breaks out in hives or develops anaphylaxis. Patient tolerates metronidazole (Flagyl).  Brilinta [Ticagrelor] Shortness Of Breath    Sulfa Antibiotics Anaphylaxis    Clindamycin/Lincomycin Hives    Diovan [Valsartan] Hives    Influenza Vaccines      Got flu    Lisinopril Swelling     angioedema    Macrolides And Ketolides     Penicillins      PT advised that he is unable to take any type of ABX, chronic hives.  Atorvastatin Hives and Rash     May 2017: broke out in hives around groin and developed yeast infection       FAMILY HISTORY     Family History   Problem Relation Age of Onset    Celiac Disease Daughter        SOCIAL HISTORY     Social History     Socioeconomic History    Marital status:      Spouse name: Not on file    Number of children: Not on file    Years of education: Not on file    Highest education level: Not on file   Occupational History    Not on file   Tobacco Use    Smoking status: Former Smoker     Packs/day: 1.00     Years: 15.00     Pack years: 15.00     Types: Cigars     Quit date: 2018     Years since quittin.2    Smokeless tobacco: Never Used   Vaping Use    Vaping Use: Never used   Substance and Sexual Activity    Alcohol use: No    Drug use: No    Sexual activity: Yes     Partners: Female   Other Topics Concern    Not on file   Social History Narrative    Not on file     Social Determinants of Health     Financial Resource Strain:     Difficulty of Paying Living Expenses: Not on file   Food Insecurity:     Worried About [address] in the Last Year: Not on file    [address] in the Last Year: Not on file   Transportation Needs:     Lack of Transportation (Medical): Not on file    Lack of Transportation (Non-Medical):  Not on file   Physical Activity:     Days of Exercise per Week: Not on file    Minutes of Exercise per Session: Not on file Stress:     Feeling of Stress : Not on file   Social Connections:     Frequency of Communication with Friends and Family: Not on file    Frequency of Social Gatherings with Friends and Family: Not on file    Attends Anglican Services: Not on file    Active Member of [address] [place] [address] or Organizations: Not on file    Attends Club or Organization Meetings: Not on file    Marital Status: Not on file   Intimate Partner Violence:     Fear of Current or Ex-Partner: Not on file    Emotionally Abused: Not on file    Physically Abused: Not on file    Sexually Abused: Not on file   Housing Stability:     Unable to Pay for Housing in the Last Year: Not on file    Number of [place] in the Last Year: Not on file    Unstable Housing in the Last Year: Not on file       REVIEW OF SYSTEMS   General: no fever, chills, night sweats, anorexia, malaise, fatigue, or weight change  Hematologic:  no unexplained bleeding or bruising  HEENT:   no nasal congestion, rhinorrhea, sore throat, or facial pain  Respiratory:  no cough, dyspnea, or chest pain  Cardiovascular:  no angina, BENITES, PND, orthopnea, dependent edema, or palpitations  Gastrointestinal:  no nausea, vomiting, diarrhea, constipation, or abdominal pain  Genitourinary:  no urinary urgency, frequency, dysuria, or hematuria  Musculoskeletal: see HPI  Endocrine:  no heat or cold intolerance and no polyphagia, polydipsia, or polyuria  Skin:  no skin eruptions or changing lesions  Neurologic:  no focal weakness, numbness/tingling, tremor, or severe headache. See HPI. See HPI for pertinent positives. PHYSICAL EXAM   Vital Signs: Ht 5' 10\" (1.778 m)   Wt 194 lb (88 kg)   BMI 27.84 kg/m²     General appearance: healthy, alert, no distress  Skin: Skin color, texture, turgor normal. No rashes or lesions  HEENT: atraumatic, normocephalic.  PERRL  Respiratory: Unlabored breathing  Lymphatic: No adenopathy   Neuro: Alert and oriented, normal distal sensation, normal bilateral DTRs  Vascular: Normal distal capillary and distal pulses  Muskuloskeletal Exam:     Bilateral Knee Examination    Left Knee Examination    Inspection: Inspection of the knee reveals no swelling, ecchymosis or deformity. Palpation: There is no evidence of knee effusion. and moderate patellar crepitus is noted. Range of Motion: Normal    Strength: Hamstrings rated: 5/5. Quadricepts rated: 5/5. Special Tests: Drawer, Lachman, Gilbert, Patellar Apprehension, Patellar Compression, Pivot shift, Valgus & Varus tests are normal.     Gait: Gait is antalgic favoring the affected side. Right Knee Examination    Inspection: Inspection of the knee reveals no swelling, ecchymosis or deformity. Palpation: There is no evidence of knee effusion. and moderate patellar crepitus is noted. Range of Motion: Normal    Strength: Hamstrings rated: 5/5. Quadricepts rated: 5/5. Special Tests: Drawer, Lachman, Gilbert, Patellar Apprehension, Patellar Compression, Pivot shift, Valgus & Varus tests are normal.     Gait: Gait is antalgic favoring the affected side. RADIOLOGY   X-rays obtained and reviewed in office:  Views bilateral knees 3 views    Impression: Mild to moderate tricompartmental degenerative change    IMPRESSION     1. Primary osteoarthritis of right knee    2. Primary osteoarthritis of left knee         PLAN   I had a lengthy discussion with patient today regarding diagnosis and treatment options and recommendations. At this time I have recommended we proceed with a series of viscosupplementation injections in the bilateral knees    PROCEDURE     Knee Injection:  The patient consented to the procedure and a time out was performed before proceeding. The anterolateral portal of the bilateral knee was prepped in the normal sterile fashion. A 25 gauge needle was introduced into the intra-articular space. 2 mL Orthovisc was injected into the joint space.   The injection flowed freely, and the patient tolerated the procedure well. Post injection expectations were reviewed with the patient. FOLLOWUP     Return in about 1 week (around 4/29/2022) for viscosupplementation injections. Orders Placed This Encounter   Procedures    XR KNEE RIGHT (3 VIEWS)     Standing Status:   Future     Number of Occurrences:   1     Standing Expiration Date:   4/20/2023     Order Specific Question:   Reason for exam:     Answer:   PAIN    XR KNEE LEFT (3 VIEWS)     Standing Status:   Future     Number of Occurrences:   1     Standing Expiration Date:   4/22/2023     Order Specific Question:   Reason for exam:     Answer:   bilat knee pain    ORTHOVISC INJECTION (For Auth/Precert)     Standing Status:   Future     Standing Expiration Date:   4/22/2023      No orders of the defined types were placed in this encounter.       Patient was instructed on appropriate use of braces, participation in home exercise programs, healthy lifestyle choices and weight loss as appropriate     Radha Da Silva MD

## 2022-04-29 ENCOUNTER — OFFICE VISIT (OUTPATIENT)
Dept: ORTHOPEDIC SURGERY | Age: 72
End: 2022-04-29
Payer: MEDICARE

## 2022-04-29 VITALS — HEIGHT: 70 IN | WEIGHT: 194 LBS | BODY MASS INDEX: 27.77 KG/M2

## 2022-04-29 DIAGNOSIS — M17.11 PRIMARY OSTEOARTHRITIS OF RIGHT KNEE: ICD-10-CM

## 2022-04-29 DIAGNOSIS — M17.12 PRIMARY OSTEOARTHRITIS OF LEFT KNEE: Primary | ICD-10-CM

## 2022-04-29 PROCEDURE — G8427 DOCREV CUR MEDS BY ELIG CLIN: HCPCS | Performed by: ORTHOPAEDIC SURGERY

## 2022-04-29 PROCEDURE — 20610 DRAIN/INJ JOINT/BURSA W/O US: CPT | Performed by: ORTHOPAEDIC SURGERY

## 2022-04-29 PROCEDURE — 1036F TOBACCO NON-USER: CPT | Performed by: ORTHOPAEDIC SURGERY

## 2022-04-29 PROCEDURE — 99212 OFFICE O/P EST SF 10 MIN: CPT | Performed by: ORTHOPAEDIC SURGERY

## 2022-04-29 PROCEDURE — G8417 CALC BMI ABV UP PARAM F/U: HCPCS | Performed by: ORTHOPAEDIC SURGERY

## 2022-04-29 PROCEDURE — 4040F PNEUMOC VAC/ADMIN/RCVD: CPT | Performed by: ORTHOPAEDIC SURGERY

## 2022-04-29 PROCEDURE — 3017F COLORECTAL CA SCREEN DOC REV: CPT | Performed by: ORTHOPAEDIC SURGERY

## 2022-04-29 PROCEDURE — 1123F ACP DISCUSS/DSCN MKR DOCD: CPT | Performed by: ORTHOPAEDIC SURGERY

## 2022-04-29 NOTE — PROGRESS NOTES
[facility]  Outpatient Progress Note  Jerry Silvestre MD    Patient Name: Kody Wilson MRN: [id number]   Age: 66 y.o. YOB: 1950   Sex: male      [address] Complaint   Patient presents with    Injections     BILATERAL KNEE [address] [facility]   Kody Wilson is a 66 y.o. male who returns today for followup for his second Orthovisc injection. Good pain relief was obtained after the prior injection was performed. PROCEDURE   The patient returns today for his second visco injection in his bilateral knee(s). The patient returns today for their second visco injection. The risks, benefits, and complications of the injections were again discussed in detail with the patient. The risks discussed included but are not limited to infection, skin reactions, hot swollen joints, and anaphylaxis. The patient gave verbal informed consent for the injection. The patient skin was prepped with 3 sterile gauze pads soaked with alcohol solution and the knee joint was injected with 2ml  Orthovisc intra-articularly under sterile conditions . The patient tolerated the injection reasonably well. The patient was given instructions to ice the the knee and avoid strenuous activities for 24-48 hours. The patient was instructed to call the office immediately if there is increased pain, redness, warmth, fever, or chills. We will see the patient back in one week for the third injection.     Marylin Gabriel MD

## 2022-04-29 NOTE — PATIENT INSTRUCTIONS
Joint Injection After [address] [facility]  Ellen Song MD    Refer to this sheet in the next few days. These insructions provide you with information on caring for yourself after you have had a joint injection. Your caregiver also may give you more specific instructions. Your treatment has been planned according to current medical practices, but problems sometimes occur. Call your caregiver if you have any problems or questions after your procedure. After any type of joint injection, it is not uncommon to experience:     A temporary increase in pain which should resolve within 2-3 days   Soreness, swelling, or bruising around the injection site   Mild numbness, tingling, or weakness around the injection site caused by the numbing medicine used before or with the injection    It is also possible to experience the following effects associated with the specific agent after injection:     Corticosteroids (these effects are rare):  o Allergic reaction  o Increased blood sugar levels (if you have diabetes and you notice that your blood sugar levels have increased, notify your caregiver)  o Increased blood pressure levels  o Mood swings   Hyaluronic acid in the use of viscosupplementation  o Temporary heat or redness  o Temporary rash and itching  o Increased fluid accumulation in the injected join    These effects all should resolve within a day or two after your procedure. HOME CARE INSTRUCTIONS     Limit yourself to light activity the day of your procedure. Avoid lifting heavy objections, bending, stooping or twisting   Take prescription or over-the-counter pain medication as directed by your caregiver   You may apply ice to your injection site to reduce pain and swelling the day of your procedure.  Ice may be applied 3-4 times:  o Put ice in a plastic bag  o Place a towel between your skin and the bag  o Leave the ice on for no longer than 15-20 minutes each time    FOLLOW-UP INSTRUCTIONS    Please make sure you keep your follow-up appointment as indicated by your physician.

## 2022-05-06 ENCOUNTER — OFFICE VISIT (OUTPATIENT)
Dept: ORTHOPEDIC SURGERY | Age: 72
End: 2022-05-06
Payer: MEDICARE

## 2022-05-06 VITALS — WEIGHT: 194 LBS | BODY MASS INDEX: 27.77 KG/M2 | HEIGHT: 70 IN

## 2022-05-06 DIAGNOSIS — M17.12 PRIMARY OSTEOARTHRITIS OF LEFT KNEE: ICD-10-CM

## 2022-05-06 DIAGNOSIS — M17.11 PRIMARY OSTEOARTHRITIS OF RIGHT KNEE: Primary | ICD-10-CM

## 2022-05-06 PROCEDURE — 3017F COLORECTAL CA SCREEN DOC REV: CPT | Performed by: ORTHOPAEDIC SURGERY

## 2022-05-06 PROCEDURE — 1123F ACP DISCUSS/DSCN MKR DOCD: CPT | Performed by: ORTHOPAEDIC SURGERY

## 2022-05-06 PROCEDURE — 1036F TOBACCO NON-USER: CPT | Performed by: ORTHOPAEDIC SURGERY

## 2022-05-06 PROCEDURE — 20610 DRAIN/INJ JOINT/BURSA W/O US: CPT | Performed by: ORTHOPAEDIC SURGERY

## 2022-05-06 PROCEDURE — G8417 CALC BMI ABV UP PARAM F/U: HCPCS | Performed by: ORTHOPAEDIC SURGERY

## 2022-05-06 PROCEDURE — 4040F PNEUMOC VAC/ADMIN/RCVD: CPT | Performed by: ORTHOPAEDIC SURGERY

## 2022-05-06 PROCEDURE — G8428 CUR MEDS NOT DOCUMENT: HCPCS | Performed by: ORTHOPAEDIC SURGERY

## 2022-05-06 NOTE — PATIENT INSTRUCTIONS
Joint Injection After [address] [facility]  Ellen Weston MD    Refer to this sheet in the next few days. These insructions provide you with information on caring for yourself after you have had a joint injection. Your caregiver also may give you more specific instructions. Your treatment has been planned according to current medical practices, but problems sometimes occur. Call your caregiver if you have any problems or questions after your procedure. After any type of joint injection, it is not uncommon to experience:     A temporary increase in pain which should resolve within 2-3 days   Soreness, swelling, or bruising around the injection site   Mild numbness, tingling, or weakness around the injection site caused by the numbing medicine used before or with the injection    It is also possible to experience the following effects associated with the specific agent after injection:     Corticosteroids (these effects are rare):  o Allergic reaction  o Increased blood sugar levels (if you have diabetes and you notice that your blood sugar levels have increased, notify your caregiver)  o Increased blood pressure levels  o Mood swings   Hyaluronic acid in the use of viscosupplementation  o Temporary heat or redness  o Temporary rash and itching  o Increased fluid accumulation in the injected join    These effects all should resolve within a day or two after your procedure. HOME CARE INSTRUCTIONS     Limit yourself to light activity the day of your procedure. Avoid lifting heavy objections, bending, stooping or twisting   Take prescription or over-the-counter pain medication as directed by your caregiver   You may apply ice to your injection site to reduce pain and swelling the day of your procedure.  Ice may be applied 3-4 times:  o Put ice in a plastic bag  o Place a towel between your skin and the bag  o Leave the ice on for no longer than 15-20 minutes each time    FOLLOW-UP INSTRUCTIONS    Please make sure you keep your follow-up appointment as indicated by your physician.

## 2022-05-06 NOTE — PROGRESS NOTES
[facility]  Outpatient Progress Note  Niels Garza MD    Patient Name: Monik Carter MRN: [id number]   Age: 74 y.o. YOB: 1950   Sex: male      [address] Complaint   Patient presents with    Injections     [address] [facility] [facility]       HISTORY OF PRESENT ILLNESS   Monik Carter is a 74 y.o. male who returns today for followup for his third Orthovisc injection. Good pain relief was obtained after the prior injection was performed. PROCEDURE   The patient returns today for his third and final visco injection in his bilateral knee(s). The risks, benefits, and complications of the injections were again discussed in detail with the patient. The risks discussed included but are not limited to infection, skin reactions, hot swollen joints, and anaphylaxis. The patient gave verbal informed consent for the injection. The patient skin was prepped with 3 sterile gauze pads soaked with alcohol solution and the knee joint was injected with 2ml Orthovisc intra-articularly under sterile conditions . The patient tolerated the injection reasonably well. The patient was given instructions to ice the the knee and avoid strenuous activities for 24-48 hours. The patient was instructed to call the office immediately if there is increased pain, redness, warmth, fever, or chills. We will see the patient back in one week for the third injection. If this was the patient's first series of injections, he will follow up with me in 4-6 weeks as needed. If multiple series have been done in the past, he will follow up when symptoms return.      Artist MD Humberto

## 2022-09-09 ENCOUNTER — OFFICE VISIT (OUTPATIENT)
Dept: RHEUMATOLOGY | Age: 72
End: 2022-09-09
Payer: MEDICARE

## 2022-09-09 VITALS
BODY MASS INDEX: 29.49 KG/M2 | WEIGHT: 206 LBS | SYSTOLIC BLOOD PRESSURE: 124 MMHG | DIASTOLIC BLOOD PRESSURE: 76 MMHG | HEIGHT: 70 IN

## 2022-09-09 DIAGNOSIS — M15.9 GENERALIZED OSTEOARTHRITIS: Primary | ICD-10-CM

## 2022-09-09 PROCEDURE — 1036F TOBACCO NON-USER: CPT | Performed by: INTERNAL MEDICINE

## 2022-09-09 PROCEDURE — G8427 DOCREV CUR MEDS BY ELIG CLIN: HCPCS | Performed by: INTERNAL MEDICINE

## 2022-09-09 PROCEDURE — G8417 CALC BMI ABV UP PARAM F/U: HCPCS | Performed by: INTERNAL MEDICINE

## 2022-09-09 PROCEDURE — 3017F COLORECTAL CA SCREEN DOC REV: CPT | Performed by: INTERNAL MEDICINE

## 2022-09-09 PROCEDURE — 99213 OFFICE O/P EST LOW 20 MIN: CPT | Performed by: INTERNAL MEDICINE

## 2022-09-09 PROCEDURE — 1123F ACP DISCUSS/DSCN MKR DOCD: CPT | Performed by: INTERNAL MEDICINE

## 2022-09-09 NOTE — PROGRESS NOTES
[address], [place]                                                           [address]                                                             [phone] (K) [phone] (F)      Dear Dr. Forrest Carmona MD:  Please find Rheumatology assessment. Thank you for giving me the opportunity to be involved in [facility] care and I look forward following Alejandra Sorenson along with you. If you have any questions or concerns please feel free to reach me. Note is transcribed using voice recognition software. Inadvertent computerized transcription errors may be present. Patient identification: Autumn Pain: [birth year],87 y.o. Sex: male     A/P  Alejandra Sorenson was seen today for follow-up. Diagnoses and all orders for this visit:    Generalized osteoarthritis      Persistent musculoskeletal discomfort-noninflammatory from osteoarthritis. Unable to take NSAIDs because of coronary artery disease and history of myocardial infarction. Does not feel comfortable taking Cymbalta. He is interested in medical marijuana. Advised patient that I do not have this expertise, and gave information on dispensary's and medical marijuana card. Medical literature shared on medical marijuana use for chronic pain/osteoarthritis. I did make him aware that given the history of angioedema, he should be careful when he tries any new substances, that can potentially be a trigger for angioedema. History of angioedema, multiple environmental triggers. Patient indicates understanding and agrees with the management plan.   I reviewed patient's history, referral documents and electronic medical records. #######################################################################    [id number] for generalized osteoarthritis and chronic musculoskeletal pain. Other medical conditions- CAD, idiopathic angioedema. Interval changes-  States that he continues to hurt everywhere-neck, lower back, thighs, knees, fingers-symptoms are 24/7, worse with physical use. Tylenol does not help, reluctant to take NSAIDs because of coronary artery disease. Family believes that Cymbalta caused GI bleed. He is interested in tincture medical marijuana. I do not know much about medical marijuana preparations and use in  arthritis-data is limited. Apparently, he spoke to one of his family members that recommended tincture medical marijuana. He does not have inflammatory arthritis. All other review of systems are negative.     Past Medical History:   Diagnosis Date    Angioedema     ED (erectile dysfunction) 12/2/2010    History of colon polyps     History of Aj-Barr virus infection     History of rectal polyps     Idiopathic urticaria     MI (myocardial infarction) ([facility] [address]     10/22/2017    Osteoarthritis     RA (rheumatoid arthritis) ([facility]     Tear of medial cartilage or meniscus of knee, current     Unspecified essential hypertension      Past Surgical History:   Procedure Laterality Date    APPENDECTOMY      CARDIAC SURGERY      cardiac stents x2    COLONOSCOPY N/A 7/25/2018    COLONOSCOPY WITH BIOPSY OF RECTAL MASS performed by Estiven Palacios MD at [place] [address] N/A 2/6/2019    COLONOSCOPY POLYPECTOMY hot snare of transverse colon polyp and proximal rectal polyp  performed by Jorge Luis Macdonald MD at [facility] 3/5/2020    COLONOSCOPY POLYPECTOMY ABLATION performed by Jorge Luis Macdonald MD at [facility]  2017    HIP SURGERY      KNEE ARTHROSCOPY Bilateral     OTHER SURGICAL HISTORY  06/12/2018    LEFT ANTERIOR HIP

## 2023-02-28 ENCOUNTER — HOSPITAL ENCOUNTER (OUTPATIENT)
Dept: CT IMAGING | Age: 73
Discharge: HOME | End: 2023-02-28

## 2023-02-28 DIAGNOSIS — C61 PROSTATE CA (HCC): ICD-10-CM

## 2023-04-28 ENCOUNTER — APPOINTMENT (OUTPATIENT)
Dept: GENERAL RADIOLOGY | Age: 73
End: 2023-04-28
Payer: MEDICARE

## 2023-04-28 ENCOUNTER — HOSPITAL ENCOUNTER (EMERGENCY)
Age: 73
Discharge: HOME | End: 2023-04-28
Attending: EMERGENCY MEDICINE
Payer: MEDICARE

## 2023-04-28 VITALS
WEIGHT: 200 LBS | BODY MASS INDEX: 28.63 KG/M2 | HEART RATE: 54 BPM | OXYGEN SATURATION: 97 % | HEIGHT: 70 IN | TEMPERATURE: 97.9 F | SYSTOLIC BLOOD PRESSURE: 119 MMHG | RESPIRATION RATE: 16 BRPM | DIASTOLIC BLOOD PRESSURE: 57 MMHG

## 2023-04-28 DIAGNOSIS — R55 SYNCOPE AND COLLAPSE: Primary | ICD-10-CM

## 2023-04-28 LAB
ALBUMIN SERPL-MCNC: 4.2 G/DL (ref 3.4–5)
ALBUMIN/GLOB SERPL: 1.4 {RATIO} (ref 1.1–2.2)
ALP SERPL-CCNC: 90 U/L (ref 40–129)
ALT SERPL-CCNC: 25 U/L (ref 10–40)
ANION GAP SERPL CALCULATED.3IONS-SCNC: 11 MMOL/L (ref 3–16)
AST SERPL-CCNC: 18 U/L (ref 15–37)
BASOPHILS # BLD: 0 K/UL (ref 0–0.2)
BASOPHILS NFR BLD: 0.8 %
BILIRUB SERPL-MCNC: 0.7 MG/DL (ref 0–1)
BUN SERPL-MCNC: 9 MG/DL (ref 7–20)
CALCIUM SERPL-MCNC: 9.6 MG/DL (ref 8.3–10.6)
CHLORIDE SERPL-SCNC: 105 MMOL/L (ref 99–110)
CO2 SERPL-SCNC: 26 MMOL/L (ref 21–32)
CREAT SERPL-MCNC: 0.9 MG/DL (ref 0.8–1.3)
DEPRECATED RDW RBC AUTO: 13 % (ref 12.4–15.4)
EKG ATRIAL RATE: 54 BPM
EKG DIAGNOSIS: NORMAL
EKG P AXIS: 52 DEGREES
EKG P-R INTERVAL: 152 MS
EKG Q-T INTERVAL: 446 MS
EKG QRS DURATION: 80 MS
EKG QTC CALCULATION (BAZETT): 422 MS
EKG R AXIS: 0 DEGREES
EKG T AXIS: 8 DEGREES
EKG VENTRICULAR RATE: 54 BPM
EOSINOPHIL # BLD: 0.2 K/UL (ref 0–0.6)
EOSINOPHIL NFR BLD: 3.7 %
GFR SERPLBLD CREATININE-BSD FMLA CKD-EPI: >60 ML/MIN/{1.73_M2}
GLUCOSE SERPL-MCNC: 144 MG/DL (ref 70–99)
HCT VFR BLD AUTO: 40.5 % (ref 40.5–52.5)
HGB BLD-MCNC: 14.1 G/DL (ref 13.5–17.5)
LYMPHOCYTES # BLD: 0.8 K/UL (ref 1–5.1)
LYMPHOCYTES NFR BLD: 14.6 %
MCH RBC QN AUTO: 29.3 PG (ref 26–34)
MCHC RBC AUTO-ENTMCNC: 34.7 G/DL (ref 31–36)
MCV RBC AUTO: 84.4 FL (ref 80–100)
MONOCYTES # BLD: 0.5 K/UL (ref 0–1.3)
MONOCYTES NFR BLD: 10.3 %
NEUTROPHILS # BLD: 3.7 K/UL (ref 1.7–7.7)
NEUTROPHILS NFR BLD: 70.6 %
PLATELET # BLD AUTO: 150 K/UL (ref 135–450)
PMV BLD AUTO: 8.6 FL (ref 5–10.5)
POTASSIUM SERPL-SCNC: 3.8 MMOL/L (ref 3.5–5.1)
PROT SERPL-MCNC: 7.3 G/DL (ref 6.4–8.2)
RBC # BLD AUTO: 4.8 M/UL (ref 4.2–5.9)
SODIUM SERPL-SCNC: 142 MMOL/L (ref 136–145)
WBC # BLD AUTO: 5.2 K/UL (ref 4–11)

## 2023-04-28 PROCEDURE — 2580000003 HC RX 258: Performed by: EMERGENCY MEDICINE

## 2023-04-28 PROCEDURE — 93010 ELECTROCARDIOGRAM REPORT: CPT | Performed by: INTERNAL MEDICINE

## 2023-04-28 PROCEDURE — 93005 ELECTROCARDIOGRAM TRACING: CPT | Performed by: EMERGENCY MEDICINE

## 2023-04-28 PROCEDURE — 36415 COLL VENOUS BLD VENIPUNCTURE: CPT

## 2023-04-28 PROCEDURE — 85025 COMPLETE CBC W/AUTO DIFF WBC: CPT

## 2023-04-28 PROCEDURE — 71045 X-RAY EXAM CHEST 1 VIEW: CPT

## 2023-04-28 PROCEDURE — 96360 HYDRATION IV INFUSION INIT: CPT

## 2023-04-28 PROCEDURE — 99285 EMERGENCY DEPT VISIT HI MDM: CPT

## 2023-04-28 PROCEDURE — 80053 COMPREHEN METABOLIC PANEL: CPT

## 2023-04-28 RX ORDER — 0.9 % SODIUM CHLORIDE 0.9 %
1000 INTRAVENOUS SOLUTION INTRAVENOUS ONCE
Status: COMPLETED | OUTPATIENT
Start: 2023-04-28 | End: 2023-04-28

## 2023-04-28 RX ADMIN — SODIUM CHLORIDE 1000 ML: 9 INJECTION, SOLUTION INTRAVENOUS at 08:30

## 2023-04-28 ASSESSMENT — LIFESTYLE VARIABLES: HOW OFTEN DO YOU HAVE A DRINK CONTAINING ALCOHOL: NEVER

## 2023-04-28 ASSESSMENT — PAIN - FUNCTIONAL ASSESSMENT: PAIN_FUNCTIONAL_ASSESSMENT: NONE - DENIES PAIN

## 2023-04-28 NOTE — ED NOTES
AVS provided and reviewed with the patient. The patient verbalized understanding of care at home, follow up care, and emergent symptoms to return for. No questions or concerns verbalized at this time. The patient is alert, oriented, stable, and ambulatory out of the department at the time of discharge.        Angie Carvajal RN  04/28/23 3728

## 2023-04-28 NOTE — ED NOTES
Reports syncopal episode while getting out of the shower this AM. States he thinks he was unconscious for approximately ten minutes and woke in a semi-seated position. Denies injury. Alert/oriented. Ambulatory to the room from the lobby.        Skye Sosa RN  04/28/23 4031

## 2023-04-28 NOTE — ED PROVIDER NOTES
follow-up closely with them as well as with his PCP as an outpatient. Patient very much in agreement of that plan and all questions answered at time of discharge      I have personally reviewed chest Xray images and radiology confirms the interpretation:  No confluent infiltrate, pneumothorax, or pleural effusion although the left pleural margins inferiorly are somewhat obscured secondary to noted cardiomegaly. Sepsis:  Is this patient to be included in the SEP-1 Core Measure due to severe sepsis or septic shock? No   Exclusion criteria - the patient is NOT to be included for SEP-1 Core Measure due to: Infection is not suspected         Old records reviewed: Outpatient office visit note from May of last year from patient's cardiology group, by Dr. Tara Alejandro, revealed that patient has history of hypertension, hyperlipidemia and cardiac disease and is on a baby aspirin and Zetia at the time and had a normal Myoview stress test in 2019 as well as a previous stent to the RCA in 2017. Labs and imaging reviewed and results discussed with patient. Patient was reassessed as noted above . Plan of care discussed with patient. Patient in agreement with plan. Strict return precautions have been given. I, Dr. Germán Mayen MD, am the primary clinician of record. During the patient's ED course, the patient was given:  Medications   0.9 % sodium chloride bolus (0 mLs IntraVENous Stopped 4/28/23 0921)        CLINICAL IMPRESSION  1. Syncope and collapse        Blood pressure (!) 119/57, pulse 54, temperature 97.9 °F (36.6 °C), temperature source Oral, resp. rate 16, height 5' 10\" (1.778 m), weight 200 lb (90.7 kg), SpO2 97 %. DISPOSITION  Adis Lenz was discharged to home in stable condition. Patient was given scripts for the following medications. I counseled patient how to take these medications.    Discharge Medication List as of 4/28/2023  9:22 AM          Follow-up with:  Kamille Rothman

## 2023-06-20 ENCOUNTER — HOSPITAL ENCOUNTER (OUTPATIENT)
Dept: CT IMAGING | Age: 73
Discharge: HOME | End: 2023-06-20
Payer: MEDICARE

## 2023-06-20 DIAGNOSIS — R91.1 SOLITARY LUNG NODULE: ICD-10-CM

## 2023-06-20 DIAGNOSIS — C61 PROSTATIC CANCER (HCC): ICD-10-CM

## 2023-06-20 PROCEDURE — 71250 CT THORAX DX C-: CPT

## 2023-08-08 ENCOUNTER — OFFICE VISIT (OUTPATIENT)
Dept: ORTHOPEDIC SURGERY | Age: 73
End: 2023-08-08
Payer: MEDICARE

## 2023-08-08 VITALS — BODY MASS INDEX: 28.63 KG/M2 | HEIGHT: 70 IN | WEIGHT: 200 LBS

## 2023-08-08 DIAGNOSIS — M17.12 PRIMARY OSTEOARTHRITIS OF LEFT KNEE: Primary | ICD-10-CM

## 2023-08-08 PROCEDURE — 3017F COLORECTAL CA SCREEN DOC REV: CPT | Performed by: ORTHOPAEDIC SURGERY

## 2023-08-08 PROCEDURE — G8427 DOCREV CUR MEDS BY ELIG CLIN: HCPCS | Performed by: ORTHOPAEDIC SURGERY

## 2023-08-08 PROCEDURE — 1123F ACP DISCUSS/DSCN MKR DOCD: CPT | Performed by: ORTHOPAEDIC SURGERY

## 2023-08-08 PROCEDURE — 1036F TOBACCO NON-USER: CPT | Performed by: ORTHOPAEDIC SURGERY

## 2023-08-08 PROCEDURE — 99213 OFFICE O/P EST LOW 20 MIN: CPT | Performed by: ORTHOPAEDIC SURGERY

## 2023-08-08 PROCEDURE — G8417 CALC BMI ABV UP PARAM F/U: HCPCS | Performed by: ORTHOPAEDIC SURGERY

## 2023-08-08 NOTE — PROGRESS NOTES
Dr Radha Brown      Date /Time 8/8/2023       9:59 AM EDT  Name Jason Zabala             1950   Location  [address] [facility] [address]  MRN [id number]                Chief Complaint   Patient presents with    Follow-up     Left Knee [place] 2022 - [place])        History of Present Illness  Jason Zabala is a 74 y.o. male who presents with  left knee pain, . Sent in consultation by FATIMAH Caldwell - CNP, . Occupation: Retired  Athletic/exercise activity: no sports. Injury Mechanism:  none. Worker's Comp. & legal issues:   none. Previous Treatments: Ice, Heat, and NSAIDs    Presents to the office today for a new problem. Patient here with a chief complaint of left medial knee pain. Patient's left knee has been painful for over a year. She did previously had viscosupplementation injections which did work well for him. His pain has returned. No new injury or trauma.   Increased pain with activities and improvement with rest.    Past History  Past Medical History:   Diagnosis Date    Angioedema     ED (erectile dysfunction) 12/2/2010    History of colon polyps     History of Aj-Barr virus infection     History of rectal polyps     Idiopathic urticaria     MI (myocardial infarction) ([address])     10/22/2017    Osteoarthritis     RA (rheumatoid arthritis) ([facility])     Tear of medial cartilage or meniscus of knee, current     Unspecified essential hypertension      Past Surgical History:   Procedure Laterality Date    APPENDECTOMY      CARDIAC SURGERY      cardiac stents x2    COLONOSCOPY N/A 7/25/2018    COLONOSCOPY WITH BIOPSY OF RECTAL MASS performed by Raheem Desouza MD at [address] N/A 2/6/2019    COLONOSCOPY POLYPECTOMY hot snare of transverse colon polyp and proximal rectal polyp  performed by Artur Salcido MD at [address] 3/5/2020    COLONOSCOPY POLYPECTOMY ABLATION performed by Artur Salcido MD at [facility]

## 2023-08-15 ENCOUNTER — NURSE ONLY (OUTPATIENT)
Dept: ORTHOPEDIC SURGERY | Age: 73
End: 2023-08-15
Payer: MEDICARE

## 2023-08-15 VITALS — BODY MASS INDEX: 28.63 KG/M2 | WEIGHT: 200 LBS | HEIGHT: 70 IN

## 2023-08-15 DIAGNOSIS — M17.12 PRIMARY OSTEOARTHRITIS OF LEFT KNEE: Primary | ICD-10-CM

## 2023-08-15 DIAGNOSIS — M17.11 PRIMARY OSTEOARTHRITIS OF RIGHT KNEE: ICD-10-CM

## 2023-08-15 PROCEDURE — 20611 DRAIN/INJ JOINT/BURSA W/US: CPT | Performed by: PHYSICIAN ASSISTANT

## 2023-08-15 PROCEDURE — 99213 OFFICE O/P EST LOW 20 MIN: CPT | Performed by: PHYSICIAN ASSISTANT

## 2023-08-15 PROCEDURE — 1123F ACP DISCUSS/DSCN MKR DOCD: CPT | Performed by: PHYSICIAN ASSISTANT

## 2023-08-15 RX ORDER — HYALURONATE SODIUM 10 MG/ML
20 SYRINGE (ML) INTRAARTICULAR ONCE
Status: COMPLETED | OUTPATIENT
Start: 2023-08-15 | End: 2023-08-15

## 2023-08-15 RX ADMIN — Medication 20 MG: at 13:02

## 2023-08-15 NOTE — PROGRESS NOTES
[facility]      Date /Time 8/15/2023       9:59 AM EDT  Name Serjio Florian             1950   Location  [facility]  MRN [id number]                Chief Complaint   Patient presents with    Follow-up     1st Euflexxa Left Knee        History of Present Illness  Serjio Florian is a 74 y.o. male who presents with  left knee pain, . Occupation: Retired  Athletic/exercise activity: no sports. Injury Mechanism:  none. Worker's Comp. & legal issues:   none. Previous Treatments: Ice, Heat, and NSAIDs    Patient presents the office today for a follow-up visit. Patient being treated for left knee osteoarthritis. We do have plans to start a series of viscosupplementation injections for the left knee today. He would like to be seen for a new problem. He is not complaining of also right knee pain. He states that since favoring the right knee it has become symptomatic. It is mostly concentrated over his medial aspect of his knee. He does have increased pain with activities and improvement with rest.  No recent injury or trauma. Previous history: Presents to the office today for a new problem. Patient here with a chief complaint of left medial knee pain. Patient's left knee has been painful for over a year. She did previously had viscosupplementation injections which did work well for him. His pain has returned. No new injury or trauma.   Increased pain with activities and improvement with rest.    Past History  Past Medical History:   Diagnosis Date    Angioedema     ED (erectile dysfunction) 12/2/2010    History of colon polyps     History of Aj-Barr virus infection     History of rectal polyps     Idiopathic urticaria     MI (myocardial infarction) ([address])     10/22/2017    Osteoarthritis     RA (rheumatoid arthritis) ([facility])     Tear of medial cartilage or meniscus of knee, current     Unspecified essential hypertension      Past Surgical History:   Procedure Laterality Date

## 2023-08-17 ENCOUNTER — APPOINTMENT (OUTPATIENT)
Dept: CT IMAGING | Age: 73
End: 2023-08-17
Payer: MEDICARE

## 2023-08-17 ENCOUNTER — APPOINTMENT (OUTPATIENT)
Dept: GENERAL RADIOLOGY | Age: 73
End: 2023-08-17
Payer: MEDICARE

## 2023-08-17 ENCOUNTER — HOSPITAL ENCOUNTER (EMERGENCY)
Age: 73
Discharge: ANOTHER HOSPITAL | End: 2023-08-17
Attending: EMERGENCY MEDICINE
Payer: MEDICARE

## 2023-08-17 VITALS
SYSTOLIC BLOOD PRESSURE: 151 MMHG | RESPIRATION RATE: 16 BRPM | BODY MASS INDEX: 28.63 KG/M2 | WEIGHT: 200 LBS | DIASTOLIC BLOOD PRESSURE: 89 MMHG | HEIGHT: 70 IN | HEART RATE: 90 BPM | OXYGEN SATURATION: 98 % | TEMPERATURE: 98.1 F

## 2023-08-17 DIAGNOSIS — I74.9 TIA DUE TO EMBOLISM (HCC): Primary | ICD-10-CM

## 2023-08-17 DIAGNOSIS — G45.9 TIA DUE TO EMBOLISM (HCC): Primary | ICD-10-CM

## 2023-08-17 LAB
ALBUMIN SERPL-MCNC: 4.6 G/DL (ref 3.4–5)
ALBUMIN/GLOB SERPL: 1.4 {RATIO} (ref 1.1–2.2)
ALP SERPL-CCNC: 98 U/L (ref 40–129)
ALT SERPL-CCNC: 21 U/L (ref 10–40)
ANION GAP SERPL CALCULATED.3IONS-SCNC: 15 MMOL/L (ref 3–16)
AST SERPL-CCNC: 20 U/L (ref 15–37)
BASOPHILS # BLD: 0 K/UL (ref 0–0.2)
BASOPHILS NFR BLD: 0.6 %
BILIRUB SERPL-MCNC: 0.6 MG/DL (ref 0–1)
BILIRUB UR QL STRIP.AUTO: NEGATIVE
BUN SERPL-MCNC: 12 MG/DL (ref 7–20)
CALCIUM SERPL-MCNC: 9.9 MG/DL (ref 8.3–10.6)
CHLORIDE SERPL-SCNC: 102 MMOL/L (ref 99–110)
CLARITY UR: CLEAR
CO2 SERPL-SCNC: 22 MMOL/L (ref 21–32)
COLOR UR: YELLOW
CREAT SERPL-MCNC: 0.9 MG/DL (ref 0.8–1.3)
DEPRECATED RDW RBC AUTO: 12.4 % (ref 12.4–15.4)
EKG ATRIAL RATE: 92 BPM
EKG DIAGNOSIS: NORMAL
EKG P AXIS: 80 DEGREES
EKG P-R INTERVAL: 168 MS
EKG Q-T INTERVAL: 394 MS
EKG QRS DURATION: 78 MS
EKG QTC CALCULATION (BAZETT): 487 MS
EKG R AXIS: -9 DEGREES
EKG T AXIS: 10 DEGREES
EKG VENTRICULAR RATE: 92 BPM
EOSINOPHIL # BLD: 0 K/UL (ref 0–0.6)
EOSINOPHIL NFR BLD: 0.1 %
GFR SERPLBLD CREATININE-BSD FMLA CKD-EPI: >60 ML/MIN/{1.73_M2}
GLUCOSE BLD-MCNC: 164 MG/DL (ref 70–99)
GLUCOSE SERPL-MCNC: 143 MG/DL (ref 70–99)
GLUCOSE UR STRIP.AUTO-MCNC: NEGATIVE MG/DL
HCT VFR BLD AUTO: 43.1 % (ref 40.5–52.5)
HGB BLD-MCNC: 14.7 G/DL (ref 13.5–17.5)
HGB UR QL STRIP.AUTO: NEGATIVE
INR PPP: 0.98 (ref 0.84–1.16)
KETONES UR STRIP.AUTO-MCNC: 40 MG/DL
LEUKOCYTE ESTERASE UR QL STRIP.AUTO: NEGATIVE
LYMPHOCYTES # BLD: 0.6 K/UL (ref 1–5.1)
LYMPHOCYTES NFR BLD: 7.6 %
MAGNESIUM SERPL-MCNC: 2 MG/DL (ref 1.8–2.4)
MCH RBC QN AUTO: 30.6 PG (ref 26–34)
MCHC RBC AUTO-ENTMCNC: 34.1 G/DL (ref 31–36)
MCV RBC AUTO: 89.8 FL (ref 80–100)
MONOCYTES # BLD: 0.5 K/UL (ref 0–1.3)
MONOCYTES NFR BLD: 7.4 %
NEUTROPHILS # BLD: 6.2 K/UL (ref 1.7–7.7)
NEUTROPHILS NFR BLD: 84.3 %
NITRITE UR QL STRIP.AUTO: NEGATIVE
PERFORMED ON: ABNORMAL
PH UR STRIP.AUTO: 6.5 [PH] (ref 5–8)
PLATELET # BLD AUTO: 260 K/UL (ref 135–450)
PMV BLD AUTO: 8.1 FL (ref 5–10.5)
POTASSIUM SERPL-SCNC: 4 MMOL/L (ref 3.5–5.1)
PROT SERPL-MCNC: 7.8 G/DL (ref 6.4–8.2)
PROT UR STRIP.AUTO-MCNC: NEGATIVE MG/DL
PROTHROMBIN TIME: 13 SEC (ref 11.5–14.8)
RBC # BLD AUTO: 4.8 M/UL (ref 4.2–5.9)
SODIUM SERPL-SCNC: 139 MMOL/L (ref 136–145)
SP GR UR STRIP.AUTO: 1.01 (ref 1–1.03)
TROPONIN, HIGH SENSITIVITY: <6 NG/L (ref 0–22)
UA COMPLETE W REFLEX CULTURE PNL UR: ABNORMAL
UA DIPSTICK W REFLEX MICRO PNL UR: ABNORMAL
URN SPEC COLLECT METH UR: ABNORMAL
UROBILINOGEN UR STRIP-ACNC: 0.2 E.U./DL
WBC # BLD AUTO: 7.3 K/UL (ref 4–11)

## 2023-08-17 PROCEDURE — 6360000004 HC RX CONTRAST MEDICATION: Performed by: EMERGENCY MEDICINE

## 2023-08-17 PROCEDURE — 81003 URINALYSIS AUTO W/O SCOPE: CPT

## 2023-08-17 PROCEDURE — 85025 COMPLETE CBC W/AUTO DIFF WBC: CPT

## 2023-08-17 PROCEDURE — 93010 ELECTROCARDIOGRAM REPORT: CPT | Performed by: INTERNAL MEDICINE

## 2023-08-17 PROCEDURE — 83735 ASSAY OF MAGNESIUM: CPT

## 2023-08-17 PROCEDURE — 80053 COMPREHEN METABOLIC PANEL: CPT

## 2023-08-17 PROCEDURE — 85610 PROTHROMBIN TIME: CPT

## 2023-08-17 PROCEDURE — 36415 COLL VENOUS BLD VENIPUNCTURE: CPT

## 2023-08-17 PROCEDURE — 70498 CT ANGIOGRAPHY NECK: CPT

## 2023-08-17 PROCEDURE — 84484 ASSAY OF TROPONIN QUANT: CPT

## 2023-08-17 PROCEDURE — 70450 CT HEAD/BRAIN W/O DYE: CPT

## 2023-08-17 PROCEDURE — 71045 X-RAY EXAM CHEST 1 VIEW: CPT

## 2023-08-17 PROCEDURE — 6370000000 HC RX 637 (ALT 250 FOR IP): Performed by: EMERGENCY MEDICINE

## 2023-08-17 PROCEDURE — 99285 EMERGENCY DEPT VISIT HI MDM: CPT

## 2023-08-17 PROCEDURE — 93005 ELECTROCARDIOGRAM TRACING: CPT | Performed by: EMERGENCY MEDICINE

## 2023-08-17 RX ORDER — TAMSULOSIN HYDROCHLORIDE 0.4 MG/1
0.4 CAPSULE ORAL DAILY
COMMUNITY
Start: 2023-08-05

## 2023-08-17 RX ORDER — CLOPIDOGREL BISULFATE 75 MG/1
300 TABLET ORAL ONCE
Status: COMPLETED | OUTPATIENT
Start: 2023-08-17 | End: 2023-08-17

## 2023-08-17 RX ADMIN — CLOPIDOGREL BISULFATE 300 MG: 75 TABLET ORAL at 16:22

## 2023-08-17 RX ADMIN — IOMEPROL INJECTION 75 ML: 714 INJECTION, SOLUTION INTRAVASCULAR at 13:32

## 2023-08-17 ASSESSMENT — ENCOUNTER SYMPTOMS
ABDOMINAL PAIN: 0
BACK PAIN: 0
SHORTNESS OF BREATH: 0

## 2023-08-17 ASSESSMENT — PAIN - FUNCTIONAL ASSESSMENT: PAIN_FUNCTIONAL_ASSESSMENT: NONE - DENIES PAIN

## 2023-08-17 NOTE — ED NOTES
Dr. Daniel Cee is speaking with Dr. Shahida Holbrook from [address] [place] [address].       Emanuel Blount  08/17/23 4566

## 2023-08-17 NOTE — ED NOTES
Patient ambulated to bathroom without difficulty. Wife at bedside. No complaints at this time. Patient continues to deny any weakness or changes during ER visit.       Tonia Colón RN  08/17/23 7468

## 2023-08-17 NOTE — ED NOTES
Dr. Michelle Oates from the stroke team is speaking with Dr. Tacho Woods.       Tricia Ortiz  08/17/23 8215

## 2023-08-17 NOTE — ED PROVIDER NOTES
[address] [facility]      Pt Name: Kyra Stearns  MRN: [id number]  [address] [facility] 1950  Date of evaluation: 8/17/2023  Provider: Christian Doan MD    CHIEF COMPLAINT       Chief Complaint   Patient presents with    Extremity Weakness     Patient reports having a fall at home and states he was unable to use left arm for approximately 15 mins. He believed he was having a stroke and had neighbors call 911. HISTORY OF PRESENT ILLNESS   (Location/Symptom, Timing/Onset, Context/Setting, Quality, Duration, Modifying Factors, Severity)  Note limiting factors. Kyra Stearns is a 74 y.o. male who presents to the emergency department     Apparently neighbors called [employer] on [address] after he was found laying on the ground apparently could not move his left arm I did not hit his head apparently had troubles with his left leg but he initially contributed to may be some arthritis that he has  The patient was very very in trying to find out if he could move his arm either from may be some trauma or may be from a stroke but anyways EMS told me he could not move his arm for a full 15 minutes. The history is provided by the patient. Nursing Notes were reviewed. REVIEW OF SYSTEMS    (2-9 systems for level 4, 10 or more for level 5)     Review of Systems   Constitutional:  Positive for activity change. Negative for chills and fever. Respiratory:  Negative for shortness of breath. Cardiovascular:  Negative for chest pain. Gastrointestinal:  Negative for abdominal pain. Musculoskeletal:  Negative for back pain and neck pain. Neurological:  Positive for weakness. Negative for dizziness. Psychiatric/Behavioral:  Negative for behavioral problems. All other systems reviewed and are negative. Except as noted above the remainder of the review of systems was reviewed and negative.        PAST MEDICAL HISTORY     Past Medical History:   Diagnosis Date

## 2023-08-17 NOTE — ED NOTES
Report called to Kavon Cook RN at [facility]. Bedside report given to [facility]. VSS, SL in place. No s/s of distress/discomfort. Patient's wife aware of transfer. Patient being transported to [facility] at this time.       Dara Christianson RN  08/17/23 3074

## 2023-09-02 ENCOUNTER — APPOINTMENT (OUTPATIENT)
Dept: CT IMAGING | Age: 73
End: 2023-09-02
Payer: MEDICARE

## 2023-09-02 ENCOUNTER — APPOINTMENT (OUTPATIENT)
Dept: GENERAL RADIOLOGY | Age: 73
End: 2023-09-02
Payer: MEDICARE

## 2023-09-02 ENCOUNTER — HOSPITAL ENCOUNTER (INPATIENT)
Age: 73
LOS: 6 days | Discharge: NURSING, REHAB OR LONG-TERM CARE | End: 2023-09-08
Attending: INTERNAL MEDICINE | Admitting: INTERNAL MEDICINE
Payer: MEDICARE

## 2023-09-02 ENCOUNTER — HOSPITAL ENCOUNTER (EMERGENCY)
Age: 73
Discharge: ANOTHER HOSPITAL | End: 2023-09-02
Attending: EMERGENCY MEDICINE
Payer: MEDICARE

## 2023-09-02 VITALS
BODY MASS INDEX: 29.39 KG/M2 | TEMPERATURE: 97.9 F | HEIGHT: 70 IN | WEIGHT: 205.3 LBS | RESPIRATION RATE: 16 BRPM | SYSTOLIC BLOOD PRESSURE: 173 MMHG | DIASTOLIC BLOOD PRESSURE: 99 MMHG | OXYGEN SATURATION: 97 % | HEART RATE: 110 BPM

## 2023-09-02 DIAGNOSIS — R53.1 ACUTE LEFT-SIDED WEAKNESS: Primary | ICD-10-CM

## 2023-09-02 DIAGNOSIS — I63.9 CEREBROVASCULAR ACCIDENT (CVA), UNSPECIFIED MECHANISM (HCC): ICD-10-CM

## 2023-09-02 LAB
ALBUMIN SERPL-MCNC: 4.4 G/DL (ref 3.4–5)
ALBUMIN/GLOB SERPL: 1.5 {RATIO} (ref 1.1–2.2)
ALP SERPL-CCNC: 100 U/L (ref 40–129)
ALT SERPL-CCNC: 22 U/L (ref 10–40)
ANION GAP SERPL CALCULATED.3IONS-SCNC: 12 MMOL/L (ref 3–16)
AST SERPL-CCNC: 18 U/L (ref 15–37)
BASOPHILS # BLD: 0.1 K/UL (ref 0–0.2)
BASOPHILS NFR BLD: 0.9 %
BILIRUB SERPL-MCNC: <0.2 MG/DL (ref 0–1)
BUN SERPL-MCNC: 17 MG/DL (ref 7–20)
CALCIUM SERPL-MCNC: 9.9 MG/DL (ref 8.3–10.6)
CHLORIDE SERPL-SCNC: 106 MMOL/L (ref 99–110)
CHP ED QC CHECK: YES
CO2 SERPL-SCNC: 26 MMOL/L (ref 21–32)
CREAT SERPL-MCNC: 0.9 MG/DL (ref 0.8–1.3)
DEPRECATED RDW RBC AUTO: 12.7 % (ref 12.4–15.4)
EOSINOPHIL # BLD: 0.1 K/UL (ref 0–0.6)
EOSINOPHIL NFR BLD: 1.7 %
GFR SERPLBLD CREATININE-BSD FMLA CKD-EPI: >60 ML/MIN/{1.73_M2}
GLUCOSE BLD-MCNC: 120 MG/DL
GLUCOSE BLD-MCNC: 120 MG/DL (ref 70–99)
GLUCOSE SERPL-MCNC: 117 MG/DL (ref 70–99)
HCT VFR BLD AUTO: 41.2 % (ref 40.5–52.5)
HGB BLD-MCNC: 14.1 G/DL (ref 13.5–17.5)
INR PPP: 0.98 (ref 0.84–1.16)
LYMPHOCYTES # BLD: 1.1 K/UL (ref 1–5.1)
LYMPHOCYTES NFR BLD: 17.9 %
MCH RBC QN AUTO: 31 PG (ref 26–34)
MCHC RBC AUTO-ENTMCNC: 34.3 G/DL (ref 31–36)
MCV RBC AUTO: 90.4 FL (ref 80–100)
MONOCYTES # BLD: 0.6 K/UL (ref 0–1.3)
MONOCYTES NFR BLD: 9 %
NEUTROPHILS # BLD: 4.4 K/UL (ref 1.7–7.7)
NEUTROPHILS NFR BLD: 70.5 %
PERFORMED ON: ABNORMAL
PLATELET # BLD AUTO: 217 K/UL (ref 135–450)
PMV BLD AUTO: 8.1 FL (ref 5–10.5)
POTASSIUM SERPL-SCNC: 4 MMOL/L (ref 3.5–5.1)
PROT SERPL-MCNC: 7.4 G/DL (ref 6.4–8.2)
PROTHROMBIN TIME: 13 SEC (ref 11.5–14.8)
RBC # BLD AUTO: 4.56 M/UL (ref 4.2–5.9)
SODIUM SERPL-SCNC: 144 MMOL/L (ref 136–145)
TROPONIN, HIGH SENSITIVITY: 11 NG/L (ref 0–22)
WBC # BLD AUTO: 6.2 K/UL (ref 4–11)

## 2023-09-02 PROCEDURE — 2580000003 HC RX 258: Performed by: EMERGENCY MEDICINE

## 2023-09-02 PROCEDURE — 70450 CT HEAD/BRAIN W/O DYE: CPT

## 2023-09-02 PROCEDURE — 71045 X-RAY EXAM CHEST 1 VIEW: CPT

## 2023-09-02 PROCEDURE — 6360000004 HC RX CONTRAST MEDICATION: Performed by: EMERGENCY MEDICINE

## 2023-09-02 PROCEDURE — 85025 COMPLETE CBC W/AUTO DIFF WBC: CPT

## 2023-09-02 PROCEDURE — 93005 ELECTROCARDIOGRAM TRACING: CPT | Performed by: EMERGENCY MEDICINE

## 2023-09-02 PROCEDURE — 85610 PROTHROMBIN TIME: CPT

## 2023-09-02 PROCEDURE — 36415 COLL VENOUS BLD VENIPUNCTURE: CPT

## 2023-09-02 PROCEDURE — 84484 ASSAY OF TROPONIN QUANT: CPT

## 2023-09-02 PROCEDURE — 96374 THER/PROPH/DIAG INJ IV PUSH: CPT

## 2023-09-02 PROCEDURE — 99285 EMERGENCY DEPT VISIT HI MDM: CPT

## 2023-09-02 PROCEDURE — 70498 CT ANGIOGRAPHY NECK: CPT

## 2023-09-02 PROCEDURE — 6360000002 HC RX W HCPCS: Performed by: EMERGENCY MEDICINE

## 2023-09-02 PROCEDURE — 80053 COMPREHEN METABOLIC PANEL: CPT

## 2023-09-02 PROCEDURE — 2000000000 HC ICU R&B

## 2023-09-02 RX ORDER — HYDROMORPHONE HYDROCHLORIDE 1 MG/ML
0.5 INJECTION, SOLUTION INTRAMUSCULAR; INTRAVENOUS; SUBCUTANEOUS ONCE
Status: DISCONTINUED | OUTPATIENT
Start: 2023-09-03 | End: 2023-09-03

## 2023-09-02 RX ORDER — LORAZEPAM 2 MG/ML
0.5 INJECTION INTRAMUSCULAR ONCE
Status: COMPLETED | OUTPATIENT
Start: 2023-09-02 | End: 2023-09-02

## 2023-09-02 RX ORDER — 0.9 % SODIUM CHLORIDE 0.9 %
500 INTRAVENOUS SOLUTION INTRAVENOUS ONCE
Status: COMPLETED | OUTPATIENT
Start: 2023-09-02 | End: 2023-09-02

## 2023-09-02 RX ADMIN — SODIUM CHLORIDE 500 ML: 9 INJECTION, SOLUTION INTRAVENOUS at 21:46

## 2023-09-02 RX ADMIN — LORAZEPAM 0.5 MG: 2 INJECTION INTRAMUSCULAR; INTRAVENOUS at 22:13

## 2023-09-02 RX ADMIN — IOPAMIDOL 85 ML: 755 INJECTION, SOLUTION INTRAVENOUS at 21:20

## 2023-09-02 ASSESSMENT — PAIN DESCRIPTION - DESCRIPTORS: DESCRIPTORS: ACHING

## 2023-09-02 ASSESSMENT — PAIN DESCRIPTION - PAIN TYPE: TYPE: CHRONIC PAIN

## 2023-09-02 ASSESSMENT — PAIN DESCRIPTION - LOCATION: LOCATION: HIP

## 2023-09-02 ASSESSMENT — LIFESTYLE VARIABLES
HOW OFTEN DO YOU HAVE A DRINK CONTAINING ALCOHOL: NEVER
HOW MANY STANDARD DRINKS CONTAINING ALCOHOL DO YOU HAVE ON A TYPICAL DAY: PATIENT DOES NOT DRINK

## 2023-09-02 ASSESSMENT — PAIN DESCRIPTION - ORIENTATION: ORIENTATION: LEFT

## 2023-09-02 ASSESSMENT — PAIN - FUNCTIONAL ASSESSMENT
PAIN_FUNCTIONAL_ASSESSMENT: PREVENTS OR INTERFERES SOME ACTIVE ACTIVITIES AND ADLS
PAIN_FUNCTIONAL_ASSESSMENT: NONE - DENIES PAIN

## 2023-09-02 ASSESSMENT — PAIN SCALES - GENERAL: PAINLEVEL_OUTOF10: 10

## 2023-09-03 ENCOUNTER — APPOINTMENT (OUTPATIENT)
Dept: MRI IMAGING | Age: 73
End: 2023-09-03
Attending: INTERNAL MEDICINE
Payer: MEDICARE

## 2023-09-03 LAB
ANION GAP SERPL CALCULATED.3IONS-SCNC: 11 MMOL/L (ref 3–16)
BUN SERPL-MCNC: 15 MG/DL (ref 7–20)
CALCIUM SERPL-MCNC: 8.9 MG/DL (ref 8.3–10.6)
CHLORIDE SERPL-SCNC: 106 MMOL/L (ref 99–110)
CHOLEST SERPL-MCNC: 166 MG/DL (ref 0–199)
CLOSURE TME BLD-IMP: NORMAL
CLOSURE TME COLL+ADP BLD: 110 SEC (ref 56–110)
CLOSURE TME COLL+EPINEP BLD: 163 SEC (ref 86–194)
CO2 SERPL-SCNC: 22 MMOL/L (ref 21–32)
CREAT SERPL-MCNC: 0.8 MG/DL (ref 0.8–1.3)
DEPRECATED RDW RBC AUTO: 12.4 % (ref 12.4–15.4)
EKG ATRIAL RATE: 103 BPM
EKG DIAGNOSIS: NORMAL
EKG P-R INTERVAL: 176 MS
EKG Q-T INTERVAL: 360 MS
EKG QRS DURATION: 84 MS
EKG QTC CALCULATION (BAZETT): 471 MS
EKG R AXIS: 4 DEGREES
EKG T AXIS: 239 DEGREES
EKG VENTRICULAR RATE: 103 BPM
GFR SERPLBLD CREATININE-BSD FMLA CKD-EPI: >60 ML/MIN/{1.73_M2}
GLUCOSE BLD-MCNC: 121 MG/DL (ref 70–99)
GLUCOSE SERPL-MCNC: 128 MG/DL (ref 70–99)
HCT VFR BLD AUTO: 36.4 % (ref 40.5–52.5)
HDLC SERPL-MCNC: 38 MG/DL (ref 40–60)
HGB BLD-MCNC: 12.7 G/DL (ref 13.5–17.5)
LDLC SERPL CALC-MCNC: 108 MG/DL
MCH RBC QN AUTO: 31.9 PG (ref 26–34)
MCHC RBC AUTO-ENTMCNC: 34.9 G/DL (ref 31–36)
MCV RBC AUTO: 91.5 FL (ref 80–100)
PERFORMED ON: ABNORMAL
PLATELET # BLD AUTO: 198 K/UL (ref 135–450)
PMV BLD AUTO: 8.2 FL (ref 5–10.5)
POTASSIUM SERPL-SCNC: 3.8 MMOL/L (ref 3.5–5.1)
RBC # BLD AUTO: 3.98 M/UL (ref 4.2–5.9)
SODIUM SERPL-SCNC: 139 MMOL/L (ref 136–145)
TRIGL SERPL-MCNC: 100 MG/DL (ref 0–150)
VLDLC SERPL CALC-MCNC: 20 MG/DL
WBC # BLD AUTO: 7.6 K/UL (ref 4–11)

## 2023-09-03 PROCEDURE — 6370000000 HC RX 637 (ALT 250 FOR IP)

## 2023-09-03 PROCEDURE — 80048 BASIC METABOLIC PNL TOTAL CA: CPT

## 2023-09-03 PROCEDURE — 93010 ELECTROCARDIOGRAM REPORT: CPT | Performed by: INTERNAL MEDICINE

## 2023-09-03 PROCEDURE — 2580000003 HC RX 258

## 2023-09-03 PROCEDURE — 80061 LIPID PANEL: CPT

## 2023-09-03 PROCEDURE — 85027 COMPLETE CBC AUTOMATED: CPT

## 2023-09-03 PROCEDURE — 99291 CRITICAL CARE FIRST HOUR: CPT | Performed by: STUDENT IN AN ORGANIZED HEALTH CARE EDUCATION/TRAINING PROGRAM

## 2023-09-03 PROCEDURE — 2000000000 HC ICU R&B

## 2023-09-03 PROCEDURE — 92610 EVALUATE SWALLOWING FUNCTION: CPT

## 2023-09-03 PROCEDURE — 2500000003 HC RX 250 WO HCPCS

## 2023-09-03 PROCEDURE — 99222 1ST HOSP IP/OBS MODERATE 55: CPT | Performed by: INTERNAL MEDICINE

## 2023-09-03 PROCEDURE — 70551 MRI BRAIN STEM W/O DYE: CPT

## 2023-09-03 PROCEDURE — APPNB45 APP NON BILLABLE 31-45 MINUTES

## 2023-09-03 PROCEDURE — 93005 ELECTROCARDIOGRAM TRACING: CPT

## 2023-09-03 PROCEDURE — 85576 BLOOD PLATELET AGGREGATION: CPT

## 2023-09-03 RX ORDER — ONDANSETRON 4 MG/1
4 TABLET, ORALLY DISINTEGRATING ORAL EVERY 8 HOURS PRN
Status: DISCONTINUED | OUTPATIENT
Start: 2023-09-03 | End: 2023-09-08 | Stop reason: HOSPADM

## 2023-09-03 RX ORDER — OXYCODONE HYDROCHLORIDE 5 MG/1
5 TABLET ORAL EVERY 4 HOURS PRN
Status: DISCONTINUED | OUTPATIENT
Start: 2023-09-03 | End: 2023-09-08 | Stop reason: HOSPADM

## 2023-09-03 RX ORDER — POLYETHYLENE GLYCOL 3350 17 G/17G
17 POWDER, FOR SOLUTION ORAL DAILY PRN
Status: DISCONTINUED | OUTPATIENT
Start: 2023-09-03 | End: 2023-09-08 | Stop reason: HOSPADM

## 2023-09-03 RX ORDER — ONDANSETRON 2 MG/ML
4 INJECTION INTRAMUSCULAR; INTRAVENOUS EVERY 6 HOURS PRN
Status: DISCONTINUED | OUTPATIENT
Start: 2023-09-03 | End: 2023-09-08 | Stop reason: HOSPADM

## 2023-09-03 RX ORDER — FENTANYL CITRATE 50 UG/ML
INJECTION, SOLUTION INTRAMUSCULAR; INTRAVENOUS
Status: DISPENSED
Start: 2023-09-03 | End: 2023-09-03

## 2023-09-03 RX ORDER — LABETALOL HYDROCHLORIDE 5 MG/ML
10 INJECTION, SOLUTION INTRAVENOUS EVERY 10 MIN PRN
Status: DISCONTINUED | OUTPATIENT
Start: 2023-09-03 | End: 2023-09-07

## 2023-09-03 RX ORDER — MECOBALAMIN 5000 MCG
5 TABLET,DISINTEGRATING ORAL ONCE
Status: COMPLETED | OUTPATIENT
Start: 2023-09-03 | End: 2023-09-03

## 2023-09-03 RX ORDER — SODIUM CHLORIDE 9 MG/ML
INJECTION, SOLUTION INTRAVENOUS PRN
Status: DISCONTINUED | OUTPATIENT
Start: 2023-09-03 | End: 2023-09-08 | Stop reason: HOSPADM

## 2023-09-03 RX ORDER — ACETAMINOPHEN 325 MG/1
650 TABLET ORAL EVERY 4 HOURS PRN
Status: DISCONTINUED | OUTPATIENT
Start: 2023-09-03 | End: 2023-09-08 | Stop reason: HOSPADM

## 2023-09-03 RX ORDER — SODIUM CHLORIDE 0.9 % (FLUSH) 0.9 %
5-40 SYRINGE (ML) INJECTION EVERY 12 HOURS SCHEDULED
Status: DISCONTINUED | OUTPATIENT
Start: 2023-09-03 | End: 2023-09-08 | Stop reason: HOSPADM

## 2023-09-03 RX ORDER — CETIRIZINE HYDROCHLORIDE 10 MG/1
10 TABLET ORAL NIGHTLY
Status: DISCONTINUED | OUTPATIENT
Start: 2023-09-03 | End: 2023-09-08 | Stop reason: HOSPADM

## 2023-09-03 RX ORDER — ASPIRIN 81 MG/1
81 TABLET ORAL DAILY
Status: DISCONTINUED | OUTPATIENT
Start: 2023-09-03 | End: 2023-09-08 | Stop reason: HOSPADM

## 2023-09-03 RX ORDER — SODIUM CHLORIDE 0.9 % (FLUSH) 0.9 %
5-40 SYRINGE (ML) INJECTION PRN
Status: DISCONTINUED | OUTPATIENT
Start: 2023-09-03 | End: 2023-09-08 | Stop reason: HOSPADM

## 2023-09-03 RX ORDER — CLOPIDOGREL BISULFATE 75 MG/1
75 TABLET ORAL DAILY
Status: DISCONTINUED | OUTPATIENT
Start: 2023-09-03 | End: 2023-09-08 | Stop reason: HOSPADM

## 2023-09-03 RX ORDER — ASPIRIN 81 MG/1
81 TABLET, CHEWABLE ORAL DAILY
Status: DISCONTINUED | OUTPATIENT
Start: 2023-09-03 | End: 2023-09-03

## 2023-09-03 RX ORDER — TAMSULOSIN HYDROCHLORIDE 0.4 MG/1
0.4 CAPSULE ORAL DAILY
Status: DISCONTINUED | OUTPATIENT
Start: 2023-09-03 | End: 2023-09-08 | Stop reason: HOSPADM

## 2023-09-03 RX ORDER — SODIUM CHLORIDE 9 MG/ML
INJECTION, SOLUTION INTRAVENOUS CONTINUOUS
Status: DISCONTINUED | OUTPATIENT
Start: 2023-09-03 | End: 2023-09-07

## 2023-09-03 RX ORDER — ROSUVASTATIN CALCIUM 20 MG/1
40 TABLET, COATED ORAL NIGHTLY
Status: DISCONTINUED | OUTPATIENT
Start: 2023-09-03 | End: 2023-09-03

## 2023-09-03 RX ORDER — MONTELUKAST SODIUM 10 MG/1
10 TABLET ORAL NIGHTLY
Status: DISCONTINUED | OUTPATIENT
Start: 2023-09-03 | End: 2023-09-08 | Stop reason: HOSPADM

## 2023-09-03 RX ORDER — 0.9 % SODIUM CHLORIDE 0.9 %
500 INTRAVENOUS SOLUTION INTRAVENOUS ONCE
Status: COMPLETED | OUTPATIENT
Start: 2023-09-03 | End: 2023-09-03

## 2023-09-03 RX ADMIN — OXYCODONE HYDROCHLORIDE 5 MG: 5 TABLET ORAL at 12:31

## 2023-09-03 RX ADMIN — MONTELUKAST 10 MG: 10 TABLET, FILM COATED ORAL at 22:05

## 2023-09-03 RX ADMIN — CLOPIDOGREL BISULFATE 75 MG: 75 TABLET ORAL at 12:30

## 2023-09-03 RX ADMIN — Medication 5 MG: at 22:06

## 2023-09-03 RX ADMIN — ACETAMINOPHEN 650 MG: 325 TABLET ORAL at 12:31

## 2023-09-03 RX ADMIN — OXYCODONE HYDROCHLORIDE 5 MG: 5 TABLET ORAL at 00:17

## 2023-09-03 RX ADMIN — ASPIRIN 81 MG: 81 TABLET, COATED ORAL at 12:31

## 2023-09-03 RX ADMIN — SODIUM CHLORIDE: 9 INJECTION, SOLUTION INTRAVENOUS at 08:19

## 2023-09-03 RX ADMIN — TAMSULOSIN HYDROCHLORIDE 0.4 MG: 0.4 CAPSULE ORAL at 12:30

## 2023-09-03 RX ADMIN — CETIRIZINE HYDROCHLORIDE 10 MG: 10 TABLET, FILM COATED ORAL at 22:05

## 2023-09-03 RX ADMIN — SODIUM CHLORIDE 2 MCG/MIN: 9 INJECTION, SOLUTION INTRAVENOUS at 05:26

## 2023-09-03 RX ADMIN — SODIUM CHLORIDE, PRESERVATIVE FREE 10 ML: 5 INJECTION INTRAVENOUS at 12:34

## 2023-09-03 RX ADMIN — SODIUM CHLORIDE 500 ML: 9 INJECTION, SOLUTION INTRAVENOUS at 02:05

## 2023-09-03 RX ADMIN — SODIUM CHLORIDE: 9 INJECTION, SOLUTION INTRAVENOUS at 03:06

## 2023-09-03 ASSESSMENT — ENCOUNTER SYMPTOMS
ABDOMINAL PAIN: 0
PHOTOPHOBIA: 0
SORE THROAT: 0
DIARRHEA: 0
VOICE CHANGE: 0
APNEA: 0
SHORTNESS OF BREATH: 0
CHEST TIGHTNESS: 0
NAUSEA: 0
ABDOMINAL PAIN: 0
WHEEZING: 0
CHOKING: 0
ABDOMINAL DISTENTION: 0
SHORTNESS OF BREATH: 0
BACK PAIN: 0
NAUSEA: 1
EYE PAIN: 0
CONSTIPATION: 0
TROUBLE SWALLOWING: 0
FACIAL SWELLING: 0
COUGH: 0
VOMITING: 0

## 2023-09-03 ASSESSMENT — PAIN DESCRIPTION - PAIN TYPE: TYPE: CHRONIC PAIN

## 2023-09-03 ASSESSMENT — PAIN SCALES - GENERAL
PAINLEVEL_OUTOF10: 2
PAINLEVEL_OUTOF10: 10
PAINLEVEL_OUTOF10: 0
PAINLEVEL_OUTOF10: 0

## 2023-09-03 ASSESSMENT — PAIN DESCRIPTION - ONSET: ONSET: ON-GOING

## 2023-09-03 ASSESSMENT — PAIN DESCRIPTION - FREQUENCY: FREQUENCY: CONTINUOUS

## 2023-09-03 ASSESSMENT — PAIN DESCRIPTION - LOCATION: LOCATION: HIP

## 2023-09-03 ASSESSMENT — PAIN DESCRIPTION - DESCRIPTORS: DESCRIPTORS: ACHING

## 2023-09-03 ASSESSMENT — PAIN DESCRIPTION - ORIENTATION: ORIENTATION: LEFT

## 2023-09-03 ASSESSMENT — PAIN - FUNCTIONAL ASSESSMENT: PAIN_FUNCTIONAL_ASSESSMENT: PREVENTS OR INTERFERES SOME ACTIVE ACTIVITIES AND ADLS

## 2023-09-03 NOTE — PROGRESS NOTES
4 Eyes Skin Assessment     NAME:  Jason Zabala  YOB: 1950  MEDICAL RECORD NUMBER:  [id number]    The patient is being assessed for  Admission    I agree that at least one RN has performed a thorough Head to Toe Skin Assessment on the patient. ALL assessment sites listed below have been assessed. Areas assessed by both nurses:    Head, Face, Ears, Shoulders, Back, Chest, Arms, Elbows, Hands, Sacrum. Buttock, Coccyx, Ischium, Legs. Feet and Heels, and Under Medical Devices         Does the Patient have a Wound? No noted wound(s)     Patient has some blanchable redness in between buttocks.    Ariel Prevention initiated by RN: Yes  Wound Care Orders initiated by RN: No- NA    Pressure Injury (Stage 3,4, Unstageable, DTI, NWPT, and Complex wounds) if present, place Wound referral order by RN under : No    New Ostomies, if present place, Ostomy referral order under : No     Nurse 1 eSignature: Electronically signed by Daylin Gibbons RN on 9/3/23 at 6:43 AM EDT    **SHARE this note so that the co-signing nurse can place an eSignature**    Nurse 2 eSignature: Electronically signed by Gio Kumar RN on 9/3/23 at 7:08 AM EDT

## 2023-09-03 NOTE — PROGRESS NOTES
V2.0  [facility] Hospitalist Progress Note      Name:  Meghan Moise /Age/Sex: 1950  (74 y.o. male)   MRN & CSN:  [id number] & [id number] Encounter Date/Time: 9/3/2023 12:03 PM EDT    Location:  [id number] PCP: Zahida Mercedes, [address] [place] [address] Dr Day: 2    Assessment and Plan:   Meghan Moise is a 74 y.o. male with pmh of right MCA stroke, known carotid artery stenosis, hypertension, hyperlipidemia who presents with Acute CVA (cerebrovascular accident) [facility])      Plan:    Acute CVA  Progression of R MCA stroke in setting of recent R MCA stroke 2 weeks prior  He had residual left-sided deficits from his previous stroke and only able to ambulate with a cane  He had increased in above deficits as well as new left facial weakness and left side neglect  Neuro team on board  CTA head and neck shows new severe stenosis in M1 of right MCA, known severe stenosis of M3 of right MCA and known calcified plaque at the origin of right ICA origin with 40% stenosis  MRI of the brain completed on 9/3/2023 shows multifocal patchy, punctate acute infarcts in the right MCA distribution  No plan for intervention from neuro intervention  Plan for medical management   Plan to keep systolic -609 mm hg. On Levophed drip currently. On aspirin, Plavix and Lipitor. SLP evaluations  PT OT      History of hypertension  History of hyperlipidemia  History of prostate cancer      Diet Diet NPO Exceptions are: Sips of Water with Meds   DVT Prophylaxis [x] Lovenox, []  Heparin, [] SCDs, [] Ambulation,  [] Eliquis, [] Xarelto  [] Coumadin   Code Status Full Code   Disposition From:   Expected Disposition:   Estimated Date of Discharge:   Patient requires continued admission due to    Surrogate Decision Maker/ POA      Personally reviewed Lab Studies and Imaging         Subjective:     Chief Complaint: No chief complaint on file.        Meghan Moise is a 74 y.o. male who presents with increased left lower extremity weakness, plaque. 2. Fibrocalcific plaque involving the proximal right internal carotid artery causing 40% stenosis. Previously present pedunculated thrombus or noncalcified plaque in the proximal right internal carotid artery is no longer evident. 3. Heterogeneous enlargement of the thyroid isthmus and left lobe. Follow-up outpatient thyroid ultrasound is recommended for further evaluation per guidelines below. RECOMMENDATIONS: Incidental heterogeneous and enlarged thyroid. Recommend nonemergent thyroid US. Reference: J Am Liz Radiol. 2015 Feb;12(2): 143-50     MRI BRAIN WO CONTRAST    Result Date: 9/3/2023  MRI head without contrast History : Right MCA stroke Contrast : none Comparison : none COMMENTS : There is moderate atrophy and periventricular microangiopathy. Right parietal white matter encephalomalacia is compatible with old infarct. Multiple small areas of patchy restricted diffusion in the right MCA distribution are compatible with acute infarcts. There is no evidence of hemorrhagic conversion. There is no hydrocephalus. There is loss of the distal right MCA flow void. Dural venous sinus flow voids are well-maintained. Mild ethmoid sinus disease. Trace mastoid fluid. Multifocal patchy, punctate acute infarcts in the right MCA distribution.  Electronically signed by Sathya Urias MD      Electronically signed by Elizabeth Cedeno MD on 9/3/2023 at 12:03 PM

## 2023-09-03 NOTE — ED NOTES
The transfer center call back with a bed icu 7831 report number is [phone]     Johnson Thomas  09/02/23 7486

## 2023-09-03 NOTE — ED NOTES
[address] call to  care for a transport to [facility] 22 mins     [employer]  09/02/23 4325

## 2023-09-03 NOTE — PROGRESS NOTES
Patient taken to MRI via stretcher with juan carlos and RN. Test completed and tolerated well. Returned to room. SpO2 dipping to high 80's while patient is resting. 2 liters NC applied. SpO2 now 98%.

## 2023-09-03 NOTE — PROGRESS NOTES
Speech Language Pathology  Facility/Department:[facility] ICU   Clinical Bedside Swallow Evaluation  Name: Edyth Lennox  : 1950  MRN: [id number]                                                     Patient Diagnosis(es):   Patient Active Problem List    Diagnosis Date Noted    Acute stroke due to ischemia ([address]) 2023    Rectal polyp 10/09/2018    Adenomatous polyp of ascending colon     Rectal mass     Chronic congestive heart failure ([address])     ECG abnormal     Rectal bleeding 2018    Coronary artery disease due to lipid rich plaque     Status post total hip replacement, left 2018    Osteoarthritis of left hip 2018    Primary osteoarthritis of left hip 2018    Arthritis 2016    Obesity (BMI 30-39.9) 2015    Hyperglycemia 2013    Low testosterone 2011    Hyperlipidemia 2011    ED (erectile dysfunction) 2010    Idiopathic urticaria     Essential hypertension      Past Medical History:   Diagnosis Date    Angioedema     ED (erectile dysfunction) 2010    History of colon polyps     History of Aj-Barr virus infection     History of rectal polyps     Idiopathic urticaria     MI (myocardial infarction) ([address])     10/22/2017    Osteoarthritis     RA (rheumatoid arthritis) ([facility])     Tear of medial cartilage or meniscus of knee, current     Unspecified essential hypertension      Past Surgical History:   Procedure Laterality Date    APPENDECTOMY      CARDIAC SURGERY      cardiac stents x2    COLONOSCOPY N/A 2018    COLONOSCOPY WITH BIOPSY OF RECTAL MASS performed by Laura Yates MD at [address] N/A 2019    COLONOSCOPY POLYPECTOMY hot snare of transverse colon polyp and proximal rectal polyp  performed by Vane Ku MD at [address] 3/5/2020    COLONOSCOPY POLYPECTOMY ABLATION performed by Vane Ku MD at [address]  [address] [place] [address] palpitation    - Overt Evidence of Aspiration: None    FUNCTIONAL ORAL INTAKE SCALE (FOIS): The FOIS is a measure of swallow function that identifies a patient's current method and/or modifications for intake of daily nutrition. Score: 5 = Total oral intake of multiple consistencies requiring special preparation    Speech, Language, Cognitive Evaluation - Cognitive evaluation deferred this date given increased Pt's fatigue, however Pt could benefit from cognitive screener/evaluation. Prognosis:  Prognosis for improvement: Good  Barriers to reach goals: recent stroke  Consulted and agree with results and recommendations: Pt and RN [place]    Treatment:  Dysphagia Goals: The pt will be seen  2-5x/week to address the following goals:  1 - Pt will tolerate recommended diet w/o overt s/s of aspiration or respiratory decline. 2 - Pt/caregiver will understand and demonstrate comprehension of recommendations/aspiration precautions. Speech Goals: TBD    Education  - Pt demonstrated understanding of results/recommendations by restating information provided. - Patient was educated on aspiration precautions (listed above) and has demonstrated ability to follow through. Pt goal: \"to eat real food\"  Pt discharge goal: \"home:    Total treatment time: 25 minutes    Plan:  Follow-up from this service is indicated:  - Will f/u over meal(s) to verify diet tolerance. - Will determine candidacy for further diet upgrade. Recommendations:  - Diet texture: Soft and Bite Sized  - Liquid:  Thin  - Straws OK  - Medication administration: Pt preference  - Oral care  - Aspiration precautions:  - Patient requires feeding assistance  - Position upright as high as possible when eating/drinking.  - Remain upright for at least 30 minutes following meals.         - Small, individual sips/bites          - Hold PO if patient appears SOB, insufficiently alert, or exhibits other signs of diet intolerance    Discharge Recommendation:

## 2023-09-03 NOTE — CONSULTS
ICU [address] Day: 1  ICU Day: 1                                                         Code:Full Code  Admit Date: 9/2/2023  PCP: FATIMAH Tello CNP                                  CC: Left-sided hemiparesis, left facial droop    HISTORY OF PRESENT ILLNESS:   Rashel ugarte is a 75 yo male with PMH right embolic CVA on 76/15/0395, right internal carotid artery stenosis, HTN, HLD, CAD with MI and PCI in 2017, prostate cancer s/p radiation therapy currently on Leupron hormonal therapy, former smoker presented to [facility] ED with left-sided hemiparesis and left facial droop. Patient was recently admitted at [facility] for left hemiparesis and found to have an acute right MCA ischemic stroke. Discharged with medical management and possible plans for outpatient TCAR. Per the patient and chart review, he has residual left-sided deficits from his previous stroke in August but is able to ambulate with a cane. Earlier today around 20:00 he was sitting in his chair at home and was unable to rise from a seated position due to acute increase in left-sided weakness. He also noticed new left facial weakness and inability to see or look to the left. He presented to [facility] emergency department where a code stroke was called. He had a CT head that showed new calcification in the M1 branch of the right MCA, new focal cortical hyperdensity in the right parietal lobe with faint associated white matter hypoattenuation. CTA head and neck confirmed a new calcification in the M1 segment of the right MCA with severe stenosis, unchanged severe stenosis of the posterior M3 segment of right MCA, fibrocalcific plaque involving the proximal right internal carotid artery causing 40% stenosis, previously present plaque in the proximal right internal carotid artery is no longer evident, incidental finding of heterogenous enlargement of the thyroid isthmus and left lobe.  EKG showed sinus

## 2023-09-03 NOTE — CONSULTS
Smokeless tobacco: Never   Vaping Use    Vaping Use: Never used   Substance Use Topics    Alcohol use: Not Currently    Drug use: No         Allergies & Outpatient Medications   ALLERGIES:  Allergies   Allergen Reactions    Azithromycin Anaphylaxis     Patient states that he is allergic to pretty much every antibiotic. He either breaks out in hives or develops anaphylaxis. Patient tolerates metronidazole (Flagyl). Brilinta [Ticagrelor] Shortness Of Breath    Sulfa Antibiotics Anaphylaxis    Clindamycin/Lincomycin Hives    Diovan [Valsartan] Hives    Influenza Vaccines      Got flu    Lisinopril Swelling     angioedema    Macrolides And Ketolides     Penicillins      PT advised that he is unable to take any type of ABX, chronic hives. Atorvastatin Hives and Rash     May 2017: broke out in hives around groin and developed yeast infection     HOME MEDICATIONS:  Current Discharge Medication List        CONTINUE these medications which have NOT CHANGED    Details   tamsulosin (FLOMAX) 0.4 MG capsule Take 1 capsule by mouth daily      montelukast (SINGULAIR) 10 MG tablet Take 1 tablet by mouth nightly      lidocaine 1 % injection 4 mLs      verapamil (CALAN SR) 240 MG extended release tablet TAKE 1 TABLET TWICE DAILY (NEED MD APPOINTMENT)  Qty: 60 tablet, Refills: 0      aspirin 81 MG tablet Take 1 tablet by mouth daily      TURMERIC PO Take by mouth      Cetirizine HCl 10 MG CAPS Take 1 capsule every day by oral route.       nitroGLYCERIN (NITROSTAT) 0.4 MG SL tablet Place 1 tablet under the tongue as needed               Physical Exam   PHYSICAL EXAM:  Vitals:    09/02/23 2339 09/02/23 2340 09/02/23 2341 09/02/23 2342   BP:       Pulse: (!) 104 (!) 103 100 (!) 113   Resp: 23 25 20 25   Temp:       TempSrc:       SpO2: 97% 98% 97% 96%         General: Alert, no distress, well-nourished  Neurologic  Mental status:   Orientation: to person, place, time, situation   Attention: intact as able to attend well to the stroke team and Dr. Kendall Lei, no need for intervention at this time. Concern for extension of R MCA territory infarcts seen previously on MRI from [facility].     RECOMMENDATIONS:  Imaging / Labs:  -Stat MRI of the brain w/o contrast to eval for stroke  -Check lipid panel and hemoglobin A1C if not already completed     Medications:  -High-intensity statin   -Continue DAPT  -SCDs for DVT prophylaxis    Consults / [address] [place] [address] elevated to help prevent aspiration  [facility] Neurologic Exams & Vitals  -NIHSS per guidelines   -Telemetry   -PT/OT: eval and treat  -PMR consult if rehab recommended   -ST: eval and treat and dysphagia screen  -Nursing bedside swallow prior to any PO intake   -Blood Pressure Management   -SBP goal 160-200mmHg    Follow up / Discharge Recommendations:  -If no obvious source of stroke identified will need 30 day event monitor arranged at discharge  -Stroke Education at Discharge  -Follow up w/ Neurology in 3 months       Management and plan discussed with:   Bedside nurse  Dr. Kendall Lei  ICU team    FATIMAH Diaz Si - CNP   Neurology & Neurocritical Care   9/3/2023 12:07 AM    ICU Patients:   [facility]:  [address]  Floor / PCU Patients:  [facility]: [facility] Neurology

## 2023-09-03 NOTE — H&P
ICU H&P      Hospital Day: 1  ICU Day: 1                                                         Code:Full Code  Admit Date: 9/2/2023  PCP: FATIMAH Feliciano CNP                                  CC: Left-sided hemiparesis, left facial droop    HISTORY OF PRESENT ILLNESS:   Emi ugarte is a 73 yo male with PMH right embolic CVA on 45/41/8250, right internal carotid artery stenosis, HTN, HLD, CAD with MI and PCI in 2017, prostate cancer s/p radiation therapy currently on Leupron hormonal therapy, former smoker presented to [facility] ED with left-sided hemiparesis and left facial droop. Patient was recently admitted [place] [facility] for [place] hemiparesis and found to have an acute right MCA ischemic stroke. Discharged with medical management and possible plans for outpatient TCAR. Per the patient and chart review, he has residual left-sided deficits from his previous stroke in August but is able to ambulate with a cane. Earlier today around 20:00 he was sitting in his chair at home and was unable to rise from a seated position due to acute increase in left-sided weakness. He also noticed new left facial weakness and inability to see or look to the left. He presented to [facility] emergency department where a code stroke was called. He had a CT head that showed new calcification in the M1 branch of the right MCA, new focal cortical hyperdensity in the right parietal lobe with faint associated white matter hypoattenuation. CTA head and neck confirmed a new calcification in the M1 segment of the right MCA with severe stenosis, unchanged severe stenosis of the posterior M3 segment of right MCA, fibrocalcific plaque involving the proximal right internal carotid artery causing 40% stenosis, previously present plaque in the proximal right internal carotid artery is no longer evident, incidental finding of heterogenous enlargement of the thyroid isthmus and left lobe.  EKG showed sinus tachycardia

## 2023-09-03 NOTE — CONSULTS
NEUROSURGERY CONSULTATION NOTE    Admitting Physician:   Primary Care Physician: FATIMAH Emery - CNP    Chief Complaint: stroke    HPI:   This is a 74year old who presented to an outside [address] [place] [address] ER with R MCA syndrome. He recently presented to a [facility] hosptial about two weeks ago also with R MCA syndrome. He was found to have a right M3 branch occlusion at that time and was managed medically. He was discharged on DAPT. His wife describes to me today that since that stroke, he has had severe left sided weakness: his left arm had no function, and his left leg was weak. She said at times he could take a couple steps through the house using a walker, but not reliably. He did not have apparent facial asymmetry or visual field cut or neglect, based on my interpretation of her description of his neurologic status over the past two weeks. Neville Bach found him with new gaze deviation and facial weakness, and he was taken to [facility] ED. CTA there revealed an occlusion of a right M2/3 insular MCA branch - similar to the occlusion 2 weeks prior, but possibly having progressed slightly more proximal. Separately there was a calcified lesion in the right M1 segment, without obvious occlusion of the M1 segment. There was also a known, calcified plaque at the right ICA origin, with about 50% stenosis. In discussion with the Neuro ICU team and  stroke neurology team, we decided to treat with medical management and blood pressure augmentation. MRI done shortly after admission revealed patchy right MCA territory acute ischemia, with more severe right MCA territory FLAIR signal abnormality, consistent with some new ischemia on top of region of existing moderate right MCA subacute ischemia.         PMHx:  Past Medical History:   Diagnosis Date    Angioedema     ED (erectile dysfunction) 12/2/2010    History of colon polyps     History of Aj-Barr virus infection     History of rectal polyps

## 2023-09-03 NOTE — ED NOTES
Report called to [facility] @ [address] [place],[address] for room 0812. All questions answered at this time.      Joe Richardson RN  09/02/23 1261

## 2023-09-03 NOTE — ED NOTES
[facility] for new patient transfer to [facility]  09/02/23 4176

## 2023-09-03 NOTE — CONSULTS
Stroke Team Consult Note     The patient is a 70-year-old male presenting to the emergency department with stroke-like symptoms. The patient was recently seen in the emergency department and admitted to the hospital including a CT head and CTA head/neck on 8/17/2023. The CTA  on 8/17 showed focal occlusion stenosis in the mid M2 segment of the right MCA. The patient was admitted to [facility] [place] where he had an MRI on 8/18 which showed scattered infarcts in the right MCA territory. Currently, the patient presents to the emergency department at [facility] with the report that his stroke-like symptoms worsened at 8 PM today including worsening left arm weakness and new symptoms of facial weakness, left leg weakness, and right gaze deviation. The patient is not a candidate for IV thrombolysis considering ischemic strokes diagnosed within the past 3 months, specifically, noted on MRI on 8/18. The clinical presentation was discussed today with Dr. Bina Soler from neurointerventional, who also reviewed the imaging from today and the recent imaging at [facility] and noted the calcification today looked similar on prior imaging (from [facility]) and CTA findings today are more consistent with R MCA stenosis. Dr. Bina Soler recommended transfer of the patient to the intensive care unit at [facility]. for closer monitoring and blood pressure augmentation was also recommended considering the chronic right MCA occlusion stenosis, however the patient has been noted to have elevated blood pressure in the ED without any intervention including pressure of 183/96 without any clear correlation thus far between blood pressure and clinical stroke symptoms.     Disposition:  Transfer to the neuro ICU at [facility]..    Jolene Strong MD   Stroke Team

## 2023-09-03 NOTE — PLAN OF CARE
Problem: Skin/Tissue Integrity  Goal: Absence of new skin breakdown  Description: 1. Monitor for areas of redness and/or skin breakdown  2. Assess vascular access sites hourly  3. Every 4-6 hours minimum:  Change oxygen saturation probe site  4. Every 4-6 hours:  If on nasal continuous positive airway pressure, respiratory therapy assess nares and determine need for appliance change or resting period. Outcome: Progressing  Note: Patient unable to turn self adequately in bed, so pillow supports in use with repositioning every two hours. Blanchable redness to sacrum/buttocks, no open areas noted. Sacral Heart Mepilex in place to protect. Problem: Safety - Adult  Goal: Free from fall injury  Outcome: Progressing  Note: Fall risk protocol in place: Bed alarm on, fall risk bracelet applied, yellow indicator in hallway on, non-skid footwear in use. Patient/family educated on fall risk protocol, instructed to call for assistance when needed.

## 2023-09-03 NOTE — ED PROVIDER NOTES
[address] [address] ED  EMERGENCY DEPARTMENT ENCOUNTER        Pt Name: Sherwin Rosenthal  MRN: [id number]  [address] [facility] [facility] 1950  Date of evaluation: 9/2/2023  Provider: Codi Cole MD  PCP: FATIMAH Burrows - CNP      CHIEF COMPLAINT       Chief Complaint   Patient presents with    Cerebrovascular Accident     Pt had a stroke 2 weeks ago as per squad. With left side deficit. Now pt came in with concern of stroke again with left arm weakness. HISTORY OFPRESENT ILLNESS   (Location/Symptom, Timing/Onset, Context/Setting, Quality, Duration, Modifying Factors,Severity)  Note limiting factors. Sherwin Rosenthal is a 74 y.o. male presenting today due to concern for initially having a stroke on August 17, 2023 and being admitted to [facility] at which point MRI confirmed acute stroke but only having some weakness of the left arm that did have some improvement but then around 8 PM this evening he all of a sudden became severely weak to the left arm and left leg along with the facial droop and rightward eye gaze. This was significantly worsened compared to when he was admitted per EMS. I also spoke to his wife at the bedside and she also reported this was much worse than last time. He had been taking aspirin and Plavix. No reported falls. He cannot sense me touching the left side of his body but can sense me touching the right side and follows commands appropriately with the right side. Due to sudden change in strength to the left side, he was brought to the ED emergently by EMS for further evaluation. He denies any chest pain or shortness of breath. He does report a mild headache. No neck pain. No vomiting but he does feel nauseated. REVIEW OF SYSTEMS    (2-9 systems for level 4, 10 or more for level 5)     Review of Systems   Constitutional:  Negative for fever. Eyes:  Positive for visual disturbance. Respiratory:  Negative for shortness of breath.     Cardiovascular:  Negative

## 2023-09-03 NOTE — ED NOTES
Upon arrival Dr. Eunice Diallo at bedside, called code stroke.  To CT at this time      Chucho Garcia RN  09/02/23 9237

## 2023-09-03 NOTE — ED NOTES
The hospitalist dr Chioma Tierney at [facility] called back on the phone with dr Debi Hall now     Riana Scanlon  09/02/23 1081

## 2023-09-03 NOTE — ED NOTES
2103 Stroke to CT    2105- Placed a call to [facility] Stroke team     Kolor[employer]vijay [facility]  09/02/23 2106

## 2023-09-03 NOTE — PROGRESS NOTES
Patient admitted to room 4516. Patient is alert, oriented and answering appropriately. Patient's strength and sensation in R arm is intact. Patient has a Right sided gaze preference and L sided neglect. Pupils 4mm and equal and reactive. L leg fall to gravity, but withdraws to pain. Sensation decreased. L arm falls to gravity, but withdraws to pain. Sensation decreased. NIH 17.  Patient complaining of 10/10 pain in L hip. BP elevated to 180's. OK BP for now. BP goal 035-031 systolic. ICU residents and Neuro NP in room to eval.   Swallow screen passed. Patient was given PO Roxicodone for pain.

## 2023-09-04 LAB
ANION GAP SERPL CALCULATED.3IONS-SCNC: 13 MMOL/L (ref 3–16)
BUN SERPL-MCNC: 7 MG/DL (ref 7–20)
CALCIUM SERPL-MCNC: 9.6 MG/DL (ref 8.3–10.6)
CHLORIDE SERPL-SCNC: 105 MMOL/L (ref 99–110)
CO2 SERPL-SCNC: 21 MMOL/L (ref 21–32)
CREAT SERPL-MCNC: 0.5 MG/DL (ref 0.8–1.3)
DEPRECATED RDW RBC AUTO: 12.2 % (ref 12.4–15.4)
GFR SERPLBLD CREATININE-BSD FMLA CKD-EPI: >60 ML/MIN/{1.73_M2}
GLUCOSE SERPL-MCNC: 148 MG/DL (ref 70–99)
HCT VFR BLD AUTO: 40.2 % (ref 40.5–52.5)
HGB BLD-MCNC: 14.5 G/DL (ref 13.5–17.5)
MAGNESIUM SERPL-MCNC: 1.9 MG/DL (ref 1.8–2.4)
MCH RBC QN AUTO: 31.9 PG (ref 26–34)
MCHC RBC AUTO-ENTMCNC: 36 G/DL (ref 31–36)
MCV RBC AUTO: 88.6 FL (ref 80–100)
PLATELET # BLD AUTO: 230 K/UL (ref 135–450)
PMV BLD AUTO: 8.4 FL (ref 5–10.5)
POTASSIUM SERPL-SCNC: 3.5 MMOL/L (ref 3.5–5.1)
RBC # BLD AUTO: 4.54 M/UL (ref 4.2–5.9)
SODIUM SERPL-SCNC: 139 MMOL/L (ref 136–145)
WBC # BLD AUTO: 8.8 K/UL (ref 4–11)

## 2023-09-04 PROCEDURE — 83735 ASSAY OF MAGNESIUM: CPT

## 2023-09-04 PROCEDURE — 6370000000 HC RX 637 (ALT 250 FOR IP)

## 2023-09-04 PROCEDURE — 99291 CRITICAL CARE FIRST HOUR: CPT

## 2023-09-04 PROCEDURE — 2580000003 HC RX 258

## 2023-09-04 PROCEDURE — 2000000000 HC ICU R&B

## 2023-09-04 PROCEDURE — 36415 COLL VENOUS BLD VENIPUNCTURE: CPT

## 2023-09-04 PROCEDURE — 85027 COMPLETE CBC AUTOMATED: CPT

## 2023-09-04 PROCEDURE — 80048 BASIC METABOLIC PNL TOTAL CA: CPT

## 2023-09-04 PROCEDURE — 99233 SBSQ HOSP IP/OBS HIGH 50: CPT | Performed by: INTERNAL MEDICINE

## 2023-09-04 RX ORDER — MECOBALAMIN 5000 MCG
5 TABLET,DISINTEGRATING ORAL ONCE
Status: COMPLETED | OUTPATIENT
Start: 2023-09-04 | End: 2023-09-04

## 2023-09-04 RX ORDER — EZETIMIBE 10 MG/1
10 TABLET ORAL NIGHTLY
Status: DISCONTINUED | OUTPATIENT
Start: 2023-09-04 | End: 2023-09-08 | Stop reason: HOSPADM

## 2023-09-04 RX ADMIN — EZETIMIBE 10 MG: 10 TABLET ORAL at 21:55

## 2023-09-04 RX ADMIN — CETIRIZINE HYDROCHLORIDE 10 MG: 10 TABLET, FILM COATED ORAL at 21:55

## 2023-09-04 RX ADMIN — MONTELUKAST 10 MG: 10 TABLET, FILM COATED ORAL at 21:55

## 2023-09-04 RX ADMIN — SODIUM CHLORIDE: 9 INJECTION, SOLUTION INTRAVENOUS at 14:29

## 2023-09-04 RX ADMIN — ACETAMINOPHEN 650 MG: 325 TABLET ORAL at 09:51

## 2023-09-04 RX ADMIN — OXYCODONE HYDROCHLORIDE 5 MG: 5 TABLET ORAL at 09:51

## 2023-09-04 RX ADMIN — SODIUM CHLORIDE, PRESERVATIVE FREE 10 ML: 5 INJECTION INTRAVENOUS at 09:51

## 2023-09-04 RX ADMIN — Medication 5 MG: at 21:55

## 2023-09-04 RX ADMIN — TAMSULOSIN HYDROCHLORIDE 0.4 MG: 0.4 CAPSULE ORAL at 09:51

## 2023-09-04 RX ADMIN — CLOPIDOGREL BISULFATE 75 MG: 75 TABLET ORAL at 09:51

## 2023-09-04 RX ADMIN — ASPIRIN 81 MG: 81 TABLET, COATED ORAL at 09:51

## 2023-09-04 ASSESSMENT — ENCOUNTER SYMPTOMS
SHORTNESS OF BREATH: 0
COUGH: 0
VOMITING: 0
NAUSEA: 0
CONSTIPATION: 0
CHEST TIGHTNESS: 0
DIARRHEA: 0
ABDOMINAL PAIN: 0

## 2023-09-04 NOTE — NURSE NAVIGATOR
Patient unavailable at this time for individual Stroke education, currently  sleeping, no family at bedside. Verified educational Stroke booklet in room for patient and/or family to review. Left at bedside table, RN notified. Patient's chart reviewed for Stroke Core Measures and additional needs:    [x]   VTE prophylaxis -SCDs on   [x]   Antithrombotic (if applicable)   [x]   Swallow screen prior to PO intake   [x]   Lipids / A1C ordered or resulted - A1C sent 9/3   [x]   Therapy ordered   [x]   Care plan and Education template    Navigator to continue to follow patient while admitted, to assist with follow up and discharge planning as needed.      Nurse eSignature: Electronically signed by Salina Oshea RN on 9/4/23 at 12:23 PM EDT - Neuroscience Navigator

## 2023-09-04 NOTE — PROGRESS NOTES
NEUROLOGY / NEUROCRITICAL CARE PROGRESS NOTE       Patient Name: Annia Field YOB: 1950   Sex: Male Age: 74 yrs     CC / Reason for Consult: CVA    Interval Hx / Changes over last 24 hours:   - Oriented overnight, per nursing patient was noted to get delirious overnight. Patient kept asking where his dog was, kept pulling his clothes off, and kept trying to get out of the bed. - Lipid panel notable for , HDL 38  - Hbg A1c pending  - On 3 mcg Levophed. BP's within goal range. - States he feels very tired this morning. Does report frontal h/a 7/10 starting earlier this AM during night shift. ROS: + h/a. No dizziness/lightheadedness. No N/V. No numbness/paresthesias. HISTORY   Admission HPI:   Annia Field is a 74 y.o. y/o male with history significant for acute stroke (08/17/2023), CAD (RCA stent 11/2017), HTN, and HLD. Per my interview with the patient he was resting in his chair at home, and was unable to get out of the chair due to a sudden increase in L sided weakness at 2000 on 9/2/2023 with new L facial weakness, and R gaze deviation. Patient taken to [facility] ED where a code stroke was called. CT head showed calcification in the R M1 and cortical hypodensity in the R parietal lobe. CTA head/neck shows calcification in R M1 with severe stenosis in the R MCA likely originating from proximal R ICA plaque causing 40% stenosis.  stroke team was notified and case discussed with Dr. Jasmine Jasmine. Imaging was compared to CT head and CTA head/neck done during admission to [address] [place] [address] (8/17/23) and was noted to be similar to those images. Patient was not a TNK candidate due to history of recent stroke. Patient also deemed to be not a candidate for thrombectomy as vessels show similar stenosis as findings from 8/17 with no clear thrombus identified. Patient transferred to [facility] ICU for further evaluation and treatment.        Of note: MRI done at [address] [place] [address] shows multiple

## 2023-09-04 NOTE — PROGRESS NOTES
ICU Progress Note    Admit Date: 9/2/2023  Day: 2  Vent Day: None  IV Access:Peripheral  IV Fluids:None  Vasopressors:None                Antibiotics: None  Diet: ADULT DIET; Dysphagia - Soft and Bite Sized    CC: Left-sided hemiparesis, left facial droop    Interval history: Patient seen and evaluated at bedside. Patient attempting to remove his gown and get up from bed to go to the bathroom, despite understanding that he has left sided weakness and is wearing an external catheter. Patient continue to have 1/5 left sided strength. Reports renewed sensation of the left side this morning. HPI:  Hiwot ugarte is a 75 yo male with PMH right embolic CVA on 97/84/3639, right internal carotid artery stenosis, HTN, HLD, CAD with MI and PCI in 2017, prostate cancer s/p radiation therapy currently on Leupron hormonal therapy, former smoker presented to [facility] ED with left-sided hemiparesis and left facial droop. Patient was recently admitted at [facility] for left hemiparesis and found to have an acute right MCA ischemic stroke. Discharged with medical management and possible plans for outpatient TCAR. Per the patient and chart review, he has residual left-sided deficits from his previous stroke in August but is able to ambulate with a cane. Earlier today around 20:00 he was sitting in his chair at home and was unable to rise from a seated position due to acute increase in left-sided weakness. He also noticed new left facial weakness and inability to see or look to the left. He presented to [facility] emergency department where a code stroke was called. He had a CT head that showed new calcification in the M1 branch of the right MCA, new focal cortical hyperdensity in the right parietal lobe with faint associated white matter hypoattenuation.  CTA head and neck confirmed a new calcification in the M1 segment of the right MCA with severe stenosis, unchanged severe stenosis of the posterior M3 severe stenosis of M3 of right MCA. Fibrocalcific plaque involving. ECHO with bubble at outside facility 08/18/23 showed EF 79-35%, normal diastolic function, mild to moderate aortic regurg, no obvious cardiac source of emboli. Previous plans for possible R TARC with outpatient vascular surgery. TNK contraindicated d/t recent stroke. Kylee Muse MRI 9/3 showed Multifocal patchy, punctate acute infarcts in the right MCA distribution. -- Sylvia consulted, appreciate recs              - No need for thrombectomy at this time              - Dual antiplatelet therapy              - Q1 hour neurochecks              - Keep SBP greater than 160 and less than 200; on Levo  -- Will continue with neuro recs above  -- A1c and lipid panel ordered     Cardiovascular:  # EKG changes  New ST depressions and T wave inversions in lateral leads compared to prior EKG in August. Troponin 11. Patient denies chest pain or SOB. Repeat EKG showed no evidence of acute ischemic change. # CAD; chronic  # HTN; chronic  # HLD; chronic  Patient reports an \"allergy\" to statin medications, reporting hives and rash. -- Holding home HTN meds for permissive hypertension  -- Will discuss starting a statin     Pulmonary:  No current issues at this time     GI:  No current issues at this time  Diet: ADULT DIET; Dysphagia - Soft and Bite Sized     Renal:  No history of renal disease  -- Cr 0.8, at baseline  -- Daily BMP     :  # Prostate cancer; chronic  Patient is s/p radiation therapy and currently on leuprolide hormone therapy. - Continue home Flomax  - Monitor UOP     Endocrine:  # Thyroid mass; new  Incidental heterogenous enlarged thyroid found on CTA head neck.  Blood glucose 120 on admission.  - Recommend nonemergent thyroid ultrasound outpatient for further evaluation     Heme:  No current issue at this time     Infectious:  No suspicion for infectious process     Code Status: Full Code   PPX: SCDs  DISPO: ICU    Leoncio Mendez DO, PGY-1  Internal

## 2023-09-04 NOTE — PROGRESS NOTES
V2.0  [facility] [place]      Name:  Melida Mota /Age/Sex: 1950  (74 y.o. male)   MRN & CSN:  [id number] & [id number] Encounter Date/Time: 2023 12:03 PM EDT    Location:  [id number] PCP: Mary Ann Parham, [address] [place] [address] Day: 3    Assessment and Plan:   Melida Mota is a 74 y.o. male with pmh of right MCA stroke, known carotid artery stenosis, hypertension, hyperlipidemia who presents with Acute stroke due to ischemia [facility])      Plan:    Acute CVA  Progression of R MCA stroke in setting of recent R MCA stroke 2 weeks prior  He had residual left-sided deficits from his previous stroke and only able to ambulate with a cane  He had increased in above deficits as well as new left facial weakness and left side neglect  Neuro team on board  CTA head and neck shows new severe stenosis in M1 of right MCA, known severe stenosis of M3 of right MCA and known calcified plaque at the origin of right ICA origin with 40% stenosis  MRI of the brain completed on 9/3/2023 shows multifocal patchy, punctate acute infarcts in the right MCA distribution  No plan for intervention from neuro intervention  Plan for medical management   Plan to keep systolic -151 mm hg. On Levophed drip currently. On aspirin, Plavix and Lipitor. SLP evaluations-on dysphagia soft and bite-size diet. PT OT      History of hypertension  History of hyperlipidemia  History of prostate cancer      Diet ADULT DIET; Dysphagia - Soft and Bite Sized   DVT Prophylaxis [x] Lovenox, []  Heparin, [] SCDs, [] Ambulation,  [] Eliquis, [] Xarelto  [] Coumadin   Code Status Full Code   Disposition From:   Expected Disposition:   Estimated Date of Discharge:   Patient requires continued admission due to    Surrogate Decision Maker/ POA      Personally reviewed Lab Studies and Imaging         Subjective:     Chief Complaint: No chief complaint on file.        Melida Mota is a 74 y.o. male who presents with increased left

## 2023-09-04 NOTE — PLAN OF CARE
Problem: Skin/Tissue Integrity  Goal: Absence of new skin breakdown  Description: 1. Monitor for areas of redness and/or skin breakdown  2. Assess vascular access sites hourly  3. Every 4-6 hours minimum:  Change oxygen saturation probe site  4. Every 4-6 hours:  If on nasal continuous positive airway pressure, respiratory therapy assess nares and determine need for appliance change or resting period. Outcome: Progressing  Note: Patient unable to turn self adequately in bed, so pillow supports in use with repositioning every two hours. Sacral Heart Mepilex in place to protect, skin intact underneath. Problem: Safety - Adult  Goal: Free from fall injury  Outcome: Progressing  Note: Fall risk protocol in place: Bed alarm on, fall risk bracelet applied, yellow indicator in hallway on, non-skid footwear in use. Patient/family educated on fall risk protocol, instructed to call for assistance when needed.

## 2023-09-05 PROBLEM — I63.511 ACUTE RIGHT MCA STROKE (HCC): Status: ACTIVE | Noted: 2023-09-02

## 2023-09-05 LAB
ANION GAP SERPL CALCULATED.3IONS-SCNC: 11 MMOL/L (ref 3–16)
BUN SERPL-MCNC: 8 MG/DL (ref 7–20)
CALCIUM SERPL-MCNC: 9.5 MG/DL (ref 8.3–10.6)
CHLORIDE SERPL-SCNC: 107 MMOL/L (ref 99–110)
CO2 SERPL-SCNC: 24 MMOL/L (ref 21–32)
CREAT SERPL-MCNC: 0.6 MG/DL (ref 0.8–1.3)
DEPRECATED RDW RBC AUTO: 12.6 % (ref 12.4–15.4)
EKG ATRIAL RATE: 79 BPM
EKG DIAGNOSIS: NORMAL
EKG P AXIS: 44 DEGREES
EKG P-R INTERVAL: 162 MS
EKG Q-T INTERVAL: 434 MS
EKG QRS DURATION: 88 MS
EKG QTC CALCULATION (BAZETT): 497 MS
EKG R AXIS: -14 DEGREES
EKG T AXIS: 5 DEGREES
EKG VENTRICULAR RATE: 79 BPM
GFR SERPLBLD CREATININE-BSD FMLA CKD-EPI: >60 ML/MIN/{1.73_M2}
GLUCOSE SERPL-MCNC: 119 MG/DL (ref 70–99)
HCT VFR BLD AUTO: 41.9 % (ref 40.5–52.5)
HGB BLD-MCNC: 15 G/DL (ref 13.5–17.5)
MAGNESIUM SERPL-MCNC: 1.8 MG/DL (ref 1.8–2.4)
MCH RBC QN AUTO: 31.5 PG (ref 26–34)
MCHC RBC AUTO-ENTMCNC: 35.8 G/DL (ref 31–36)
MCV RBC AUTO: 88 FL (ref 80–100)
PLATELET # BLD AUTO: 197 K/UL (ref 135–450)
PMV BLD AUTO: 8.4 FL (ref 5–10.5)
POTASSIUM SERPL-SCNC: 3.3 MMOL/L (ref 3.5–5.1)
RBC # BLD AUTO: 4.76 M/UL (ref 4.2–5.9)
SODIUM SERPL-SCNC: 142 MMOL/L (ref 136–145)
WBC # BLD AUTO: 7.4 K/UL (ref 4–11)

## 2023-09-05 PROCEDURE — 80048 BASIC METABOLIC PNL TOTAL CA: CPT

## 2023-09-05 PROCEDURE — 97162 PT EVAL MOD COMPLEX 30 MIN: CPT

## 2023-09-05 PROCEDURE — 2580000003 HC RX 258

## 2023-09-05 PROCEDURE — 99232 SBSQ HOSP IP/OBS MODERATE 35: CPT | Performed by: NURSE PRACTITIONER

## 2023-09-05 PROCEDURE — 97530 THERAPEUTIC ACTIVITIES: CPT

## 2023-09-05 PROCEDURE — 93010 ELECTROCARDIOGRAM REPORT: CPT | Performed by: INTERNAL MEDICINE

## 2023-09-05 PROCEDURE — 6370000000 HC RX 637 (ALT 250 FOR IP)

## 2023-09-05 PROCEDURE — 6370000000 HC RX 637 (ALT 250 FOR IP): Performed by: INTERNAL MEDICINE

## 2023-09-05 PROCEDURE — 83735 ASSAY OF MAGNESIUM: CPT

## 2023-09-05 PROCEDURE — 97167 OT EVAL HIGH COMPLEX 60 MIN: CPT

## 2023-09-05 PROCEDURE — 36415 COLL VENOUS BLD VENIPUNCTURE: CPT

## 2023-09-05 PROCEDURE — 85027 COMPLETE CBC AUTOMATED: CPT

## 2023-09-05 PROCEDURE — 6370000000 HC RX 637 (ALT 250 FOR IP): Performed by: STUDENT IN AN ORGANIZED HEALTH CARE EDUCATION/TRAINING PROGRAM

## 2023-09-05 PROCEDURE — 2060000000 HC ICU INTERMEDIATE R&B

## 2023-09-05 PROCEDURE — 6370000000 HC RX 637 (ALT 250 FOR IP): Performed by: NURSE PRACTITIONER

## 2023-09-05 PROCEDURE — 99233 SBSQ HOSP IP/OBS HIGH 50: CPT | Performed by: INTERNAL MEDICINE

## 2023-09-05 RX ORDER — MECOBALAMIN 5000 MCG
5 TABLET,DISINTEGRATING ORAL NIGHTLY
Status: DISCONTINUED | OUTPATIENT
Start: 2023-09-05 | End: 2023-09-08 | Stop reason: HOSPADM

## 2023-09-05 RX ORDER — VERAPAMIL HYDROCHLORIDE 240 MG/1
240 TABLET, FILM COATED, EXTENDED RELEASE ORAL NIGHTLY
Status: DISCONTINUED | OUTPATIENT
Start: 2023-09-05 | End: 2023-09-08 | Stop reason: HOSPADM

## 2023-09-05 RX ADMIN — MONTELUKAST 10 MG: 10 TABLET, FILM COATED ORAL at 20:25

## 2023-09-05 RX ADMIN — VERAPAMIL HYDROCHLORIDE 240 MG: 240 TABLET, FILM COATED, EXTENDED RELEASE ORAL at 20:25

## 2023-09-05 RX ADMIN — ASPIRIN 81 MG: 81 TABLET, COATED ORAL at 08:39

## 2023-09-05 RX ADMIN — ACETAMINOPHEN 650 MG: 325 TABLET ORAL at 08:56

## 2023-09-05 RX ADMIN — CLOPIDOGREL BISULFATE 75 MG: 75 TABLET ORAL at 08:39

## 2023-09-05 RX ADMIN — POTASSIUM BICARBONATE 40 MEQ: 782 TABLET, EFFERVESCENT ORAL at 16:24

## 2023-09-05 RX ADMIN — CETIRIZINE HYDROCHLORIDE 10 MG: 10 TABLET, FILM COATED ORAL at 20:25

## 2023-09-05 RX ADMIN — SODIUM CHLORIDE: 9 INJECTION, SOLUTION INTRAVENOUS at 11:33

## 2023-09-05 RX ADMIN — SODIUM CHLORIDE, PRESERVATIVE FREE 10 ML: 5 INJECTION INTRAVENOUS at 08:00

## 2023-09-05 RX ADMIN — EZETIMIBE 10 MG: 10 TABLET ORAL at 20:25

## 2023-09-05 RX ADMIN — TAMSULOSIN HYDROCHLORIDE 0.4 MG: 0.4 CAPSULE ORAL at 08:39

## 2023-09-05 RX ADMIN — Medication 5 MG: at 20:25

## 2023-09-05 RX ADMIN — SODIUM CHLORIDE: 9 INJECTION, SOLUTION INTRAVENOUS at 20:24

## 2023-09-05 ASSESSMENT — PAIN SCALES - GENERAL
PAINLEVEL_OUTOF10: 0
PAINLEVEL_OUTOF10: 5
PAINLEVEL_OUTOF10: 0

## 2023-09-05 ASSESSMENT — ENCOUNTER SYMPTOMS
CHEST TIGHTNESS: 0
SORE THROAT: 0
PHOTOPHOBIA: 0
VOMITING: 0
VOICE CHANGE: 0
NAUSEA: 0
DIARRHEA: 0
CONSTIPATION: 0
SHORTNESS OF BREATH: 0
ABDOMINAL PAIN: 0
EYE PAIN: 0
TROUBLE SWALLOWING: 1
COUGH: 0

## 2023-09-05 ASSESSMENT — PAIN DESCRIPTION - LOCATION: LOCATION: HEAD

## 2023-09-05 ASSESSMENT — PAIN DESCRIPTION - DESCRIPTORS: DESCRIPTORS: ACHING

## 2023-09-05 ASSESSMENT — PAIN DESCRIPTION - ORIENTATION: ORIENTATION: RIGHT;POSTERIOR;LOWER

## 2023-09-05 NOTE — NURSE NAVIGATOR
Patient unavailable at this time for individual Stroke education, currently resting - not appropriate for visitors at this time. No family noted to be at bedside att. Verified educational Stroke booklet in room for patient and/or family to review. Patients personalized risk factors specific to stroke include: Prior CVA, CAD, HLD, HTN     Patient's chart reviewed for Stroke Core Measures and additional needs:    [x]   VTE prophylaxis   [x]   Antithrombotic (if applicable)   [x]   Swallow screen prior to PO intake   [x]   Lipids / A1C ordered or resulted   [x]   Therapy ordered   [x]   Care plan and Education template    Navigator to continue to follow patient while admitted, to assist with follow up and discharge planning as needed.      Nurse eSignature: Electronically signed by Morales Banegas RN on 9/5/23 at 12:49 PM EDT - Neuroscience Navigator

## 2023-09-05 NOTE — PROGRESS NOTES
ICU Progress Note    Admit Date: 9/2/2023  Day: 3  Vent Day: None  IV Access:Peripheral  IV Fluids:None  Vasopressors:None                Antibiotics: None  Diet: ADULT DIET; Dysphagia - Soft and Bite Sized  ADULT ORAL NUTRITION SUPPLEMENT; Breakfast, Lunch, Dinner; Standard High Calorie/High Protein Oral Supplement    CC: Left-sided hemiparesis, left facial droop    Interval history: Patient seen and evaluated at bedside. Patient states that he feel sa little better today but is still experiencing a headache on the right base of his skull that he has been having for the past few days. He also reports a new headache across his eyebrows. Patient has not been sleeping and wonders if he could have anything to help him sleep. HPI:   \"Grady ugarte is a 75 yo male with PMH right embolic CVA on 67/62/3217, right internal carotid artery stenosis, HTN, HLD, CAD with MI and PCI in 2017, prostate cancer s/p radiation therapy currently on Leupron hormonal therapy, former smoker presented to [facility] ED with left-sided hemiparesis and left facial droop. Patient was recently admitted at [facility] for left hemiparesis and found to have an acute right MCA ischemic stroke. Discharged with medical management and possible plans for outpatient TCAR. Per the patient and chart review, he has residual left-sided deficits from his previous stroke in August but is able to ambulate with a cane. Earlier today around 20:00 he was sitting in his chair at home and was unable to rise from a seated position due to acute increase in left-sided weakness. He also noticed new left facial weakness and inability to see or look to the left. He presented to [facility] emergency department where a code stroke was called. He had a CT head that showed new calcification in the M1 branch of the right MCA, new focal cortical hyperdensity in the right parietal lobe with faint associated white matter hypoattenuation.  CTA head and changes  New ST depressions and T wave inversions in lateral leads compared to prior EKG in August. Troponin 11. Patient denies chest pain or SOB. Repeat EKG showed no evidence of acute ischemic change. # CAD; chronic  # HTN; chronic  # HLD; chronic  Patient reports an \"allergy\" to statin medications, reporting hives and rash. -- Holding home HTN meds for permissive hypertension  -- Will discuss starting a statin     Pulmonary:  No current issues at this time     GI:  No current issues at this time  Diet: ADULT DIET; Dysphagia - Soft and Bite Sized     Renal:  No history of renal disease  -- Cr 0.8, at baseline  -- Daily BMP     :  # Prostate cancer; chronic  Patient is s/p radiation therapy and currently on leuprolide hormone therapy. - Continue home Flomax  - Monitor UOP     Endocrine:  # Thyroid mass; new  Incidental heterogenous enlarged thyroid found on CTA head neck. Blood glucose 120 on admission.  - Recommend nonemergent thyroid ultrasound outpatient for further evaluation     Heme:  No current issue at this time     Infectious:  No suspicion for infectious process     Code Status: Full Code   PPX: SCDs  DISPO: ICU    [facility] Dwayne, PGY-1  Internal Medicine Resident  Contact via [address] [place]  09/05/23  8:28 AM    This patient has been staffed and discussed with Gene Woods MD.     Patient reviewed, findings as discussed with Dr. Mar Patricia. Agree with assessment and plan. Increased neurologic deficit appears to be extension of prior stroke. Blood pressure control is adequate, allowing autoregulation. We will gradually decrease BP. Focus on OT/PT to improve mobility.

## 2023-09-05 NOTE — PROGRESS NOTES
Physical Therapy  Facility/Department: [address] [place] [address]  Physical Therapy Initial Assessment and treatment    Name: Wilbert Rodriguez  : 1950  MRN: [id number]  Date of Service: 2023    Discharge Recommendations:    Wilbert Rodriguez scored a 6/24 on the AM-PAC short mobility form. Current research shows that an AM-PAC score of 17 or less is typically not associated with a discharge to the patient's home setting. Based on the patient's AM-PAC score and their current functional mobility deficits, it is recommended that the patient have 5-7 sessions per week of Physical Therapy at d/c to increase the patient's independence. At this time, this patient demonstrates complex nursing, medical, and rehabilitative needs, and would benefit from intensive rehabilitation services upon discharge from the Inpatient setting. This patient demonstrates the ability to participate in and benefit from an intensive therapy program with a coordinated interdisciplinary team approach to foster frequent, structured, and documented communication among disciplines, who will work together to establish, prioritize, and achieve treatment goals. Please see assessment section for further patient specific details. PT Equipment Recommendations  Equipment Needed: No (defer to next level of care)      Patient Diagnosis(es): There were no encounter diagnoses. Past Medical History:  has a past medical history of Angioedema, ED (erectile dysfunction), History of colon polyps, History of Aj-Barr virus infection, History of rectal polyps, Idiopathic urticaria, MI (myocardial infarction) ([address]), Osteoarthritis, RA (rheumatoid arthritis) ([address]), Tear of medial cartilage or meniscus of knee, current, and Unspecified essential hypertension.   Past Surgical History:  has a past surgical history that includes Cardiac surgery; other surgical history (2018); hip surgery; Coronary angioplasty with stent (); pr egd transoral biopsy single/multiple Orientation  Overall Orientation Status: Within Functional Limits  Cognition  Overall Cognitive Status: WFL     Objective           AROM RLE (degrees)  RLE AROM: WFL  PROM LLE (degrees)  LLE PROM: Exceptions  LLE General PROM: PROM WFL, no AROM noted  Strength RLE  Strength RLE: WFL  Strength LLE  Comment: 0/5 throughout           Bed mobility  Rolling to Left: Dependent/Total  Supine to Sit: Dependent/Total;2 Person assistance (head of bed flat, assist with LEs and trunk)  Sit to Supine: Dependent/Total;2 Person assistance (head of bed flat, assist with LEs and trunk)  Scooting: Dependent/Total;2 Person assistance (total assist x 2 to scoot toward edge of bed and for positioning in supine)  Transfers  Comment: unable to safely attempt due to deficits with static sitting balance; recommend out of bed to chair via maxi move and RN staff as tolerated        Balance  Sitting - Static: Poor (dependent to sit EOB x 7-8 minutes due to posterior and left sided trunk lean)  Comments: cues for upright posture and to correct left sided trunk lean but unable to correct           OutComes Score                                                  AM-PAC Score  -PAC Inpatient Mobility Raw Score : 6 (09/05/23 1251)  -PAC Inpatient T-Scale Score : 23.55 (09/05/23 1251)  Mobility Inpatient CMS 0-100% Score: 100 (09/05/23 1251)  Mobility Inpatient CMS G-Code Modifier : CN (09/05/23 1251)          Tinneti Score       Goals  Short Term Goals  Time Frame for Short Term Goals: discharge  Short Term Goal 1: patient will perform bed mobility with maximal assistance  Short Term Goal 2: patient will sit EOB x 5 minutes with minimal assistance  Short Term Goal 3: patient will perform 10 reps of LE exercises, A/AAROM  Patient Goals   Patient Goals : none stated       Education  Patient Education  Education Given To: Patient  Education Provided: Role of Therapy;Plan of Care  Education Method: Verbal  Barriers to Learning: None  Education

## 2023-09-05 NOTE — DISCHARGE INSTRUCTIONS
[facility] Stroke Program Survey  The [facility] values your feedback related to your recent hospital visit and admission. We strive to improve our Neuroscience program to promote better outcomes and recoveries for all our patients. The anonymous survey below consists of a few questions that are related to your stay and around your Stroke diagnosis, treatment, and recovery. It is anonymous and has only a few questions. The estimated length of time needed to complete this survey is 3 minutes or less. Thank you for completing this survey!

## 2023-09-05 NOTE — PLAN OF CARE
Problem: Discharge Planning  Goal: Discharge to home or other facility with appropriate resources  Outcome: Progressing  Flowsheets (Taken 9/5/2023 0800)  Discharge to home or other facility with appropriate resources:   Identify barriers to discharge with patient and caregiver   Arrange for needed discharge resources and transportation as appropriate   Identify discharge learning needs (meds, wound care, etc)     Problem: Pain  Goal: Verbalizes/displays adequate comfort level or baseline comfort level  Outcome: Progressing  Flowsheets (Taken 9/5/2023 0800)  Verbalizes/displays adequate comfort level or baseline comfort level:   Encourage patient to monitor pain and request assistance   Assess pain using appropriate pain scale   Administer analgesics based on type and severity of pain and evaluate response   Implement non-pharmacological measures as appropriate and evaluate response     Problem: Skin/Tissue Integrity  Goal: Absence of new skin breakdown  Description: 1. Monitor for areas of redness and/or skin breakdown  2. Assess vascular access sites hourly  3. Every 4-6 hours minimum:  Change oxygen saturation probe site  4. Every 4-6 hours:  If on nasal continuous positive airway pressure, respiratory therapy assess nares and determine need for appliance change or resting period.   Outcome: Progressing     Problem: Safety - Adult  Goal: Free from fall injury  Outcome: Progressing     Problem: ABCDS Injury Assessment  Goal: Absence of physical injury  Outcome: Progressing     Problem: Chronic Conditions and Co-morbidities  Goal: Patient's chronic conditions and co-morbidity symptoms are monitored and maintained or improved  Outcome: Progressing  Flowsheets (Taken 9/5/2023 0800)  Care Plan - Patient's Chronic Conditions and Co-Morbidity Symptoms are Monitored and Maintained or Improved:   Monitor and assess patient's chronic conditions and comorbid symptoms for stability, deterioration, or improvement

## 2023-09-05 NOTE — PROGRESS NOTES
Occupational Therapy  Facility/Department: [facility] ICU  Occupational Therapy Initial Assessment/tx    Name: Geovanny Bagley  : 1950  MRN: [id number]  Date of Service: 2023    Discharge Recommendations:  Geovanny Bagley scored a 8/24 on the AM-PAC ADL Inpatient form. Current research shows that an AM-PAC score of 17 or less is typically not associated with a discharge to the patient's home setting. Based on the patient's AM-PAC score and their current ADL deficits, it is recommended that the patient have 5-7 sessions per week of Occupational Therapy at d/c to increase the patient's independence. At this time, this patient demonstrates complex nursing, medical, and rehabilitative needs, and would benefit from intensive rehabilitation services upon discharge from the Inpatient setting. This patient demonstrates the ability to participate in and benefit from an intensive therapy program with a coordinated interdisciplinary team approach to foster frequent, structured, and documented communication among disciplines, who will work together to establish, prioritize, and achieve treatment goals. Please see assessment section for further patient specific details. If patient discharges prior to next session this note will serve as a discharge summary. Please see below for the latest assessment towards goals. OT Equipment Recommendations  Other: defer to next level of care       Patient Diagnosis(es): There were no encounter diagnoses. Past Medical History:  has a past medical history of Angioedema, ED (erectile dysfunction), History of colon polyps, History of Aj-Barr virus infection, History of rectal polyps, Idiopathic urticaria, MI (myocardial infarction) ([address]), Osteoarthritis, RA (rheumatoid arthritis) ([address]), Tear of medial cartilage or meniscus of knee, current, and Unspecified essential hypertension.   Past Surgical History:  has a past surgical history that includes Cardiac surgery; other surgical history (06/12/2018); hip surgery; Coronary angioplasty with stent (2017); pr egd transoral biopsy single/multiple (N/A, 7/25/2018); Colonoscopy (N/A, 7/25/2018); Appendectomy; pr dstrj lesion anus simple surg excision (N/A, 10/9/2018); pr colonoscopy flx dx w/collj spec when pfrmd (N/A, 10/9/2018); Colonoscopy (N/A, 2/6/2019); Knee arthroscopy (Bilateral); and Colonoscopy (N/A, 3/5/2020). Treatment Diagnosis: impaired ADLs and mobility      Assessment   Performance deficits / Impairments: Decreased functional mobility ; Decreased ADL status; Decreased endurance;Decreased ROM; Decreased strength;Decreased vision/visual deficit; Decreased balance;Decreased sensation  Assessment: Pt admitted with diagnosis of acute ischemic stroke. He is functioning well below baseline level:  dep of 2 for bed mobility, dep of 1 for static sitting, generally dep with all ADLs. He has no L UE AROM, eyes deviated to R and poor static sitting balance. Prior to admission, pt resided with wife and was indep with ADLs, indep with mobility with use of cane. Recommend intensive inpatient OT at discharge to maximize functional status.   Treatment Diagnosis: impaired ADLs and mobility  Prognosis: Good  Decision Making: High Complexity  REQUIRES OT FOLLOW-UP: Yes  Activity Tolerance  Activity Tolerance: Patient limited by fatigue        Plan   Occupational Therapy Plan  Times Per Week: 5-7  Current Treatment Recommendations: Balance training, ROM, Strengthening, Functional mobility training, Endurance training, Safety education & training, Self-Care / ADL     Restrictions  Position Activity Restriction  Other position/activity restrictions: up as tolerated    Subjective   General  Chart Reviewed: Yes  Patient assessed for rehabilitation services?: Yes  Additional Pertinent Hx: PMH:  CVA 8/17/23, HTN, HLD, CAD with MI and PCI 2017, RA, prostate cancer, Aj-Barr virus  Family / Caregiver Present: No  Referring Practitioner: Linnea Garzon

## 2023-09-05 NOTE — PROGRESS NOTES
Pt's daughter @ bedside. Head to toe assessment completed; neuro unchanged. Pt given PRN tylenol for headache. VSS.

## 2023-09-05 NOTE — PROGRESS NOTES
Pt becoming confused, wants to get out of bed, states he is at his house, does not understand what is going on. Pt reoriented, no other changes to neuro assessment. Neuro NP to bedside, assessed. No further orders at this time, delirium prevention measures in place.

## 2023-09-05 NOTE — CARE COORDINATION
Case Management Assessment  Initial Evaluation    Date/Time of Evaluation: 9/5/2023 4:42 PM  Assessment Completed by: Alejandra Matt RN    If patient is discharged prior to next notation, then this note serves as note for discharge by case management. Patient Name: Vivian Noguera                   YOB: 1950  Diagnosis: Acute CVA (cerebrovascular accident) [facility]) [I63.9]                   Date / Time: 9/2/2023 11:38 PM    Patient Admission Status: Inpatient   Readmission Risk (Low < 19, Mod (19-27), High > 27): Readmission Risk Score: 17.3    Current PCP: FATIMAH Durant CNP  PCP verified by CM? Yes    Chart Reviewed: Yes      History Provided by: Patient, Spouse  Patient Orientation: Alert and Oriented    Patient Cognition: Alert    Hospitalization in the last 30 days (Readmission):  No    If yes, Readmission Assessment in CM Navigator will be completed. Advance Directives:      Code Status: Full Code   Patient's Primary Decision Maker is: Legal Next of Kin      Discharge Planning:    Patient lives with: Spouse/Significant Other Type of Home: House  Primary Care Giver: Self  Patient Support Systems include: Spouse/Significant Other, Children, Family Members   Current Financial resources: Medicare  Current community resources: None  Current services prior to admission: None            Current DME:              Type of Home Care services:  OT, PT, Skilled Therapy    ADLS  Prior functional level: Independent in ADLs/IADLs  Current functional level: Independent in ADLs/IADLs    PT AM-PAC: 6 /24  OT AM-PAC: 8 /24    Family can provide assistance at DC: Yes  Would you like Case Management to discuss the discharge plan with any other family members/significant others, and if so, who? Yes  Plans to Return to Present Housing: Yes  Other Identified Issues/Barriers to RETURNING to current housing: yes-after rehab  Potential Assistance needed at discharge:  Outpatient PT/OT, Home Care, Durable Medical

## 2023-09-05 NOTE — PROGRESS NOTES
cerebral arteries are normal in caliber. There is no aneurysm. POSTERIOR CIRCULATION: No significant stenosis of the vertebral, basilar, or posterior cerebral arteries. No aneurysm. OTHER: No dural venous sinus thrombosis on this non-dedicated study. BRAIN: No mass effect or midline shift. No extra-axial fluid collection. The gray-white differentiation is maintained. 1. New calcification in the M1 segment right middle cerebral artery causing severe stenosis with unchanged severe stenosis of a posterior M3 segment of the right middle cerebral artery. This calcification is likely to have originated from the proximal right internal carotid artery at the site of previously present pedunculated plaque. 2. Fibrocalcific plaque involving the proximal right internal carotid artery causing 40% stenosis. Previously present pedunculated thrombus or noncalcified plaque in the proximal right internal carotid artery is no longer evident. 3. Heterogeneous enlargement of the thyroid isthmus and left lobe. Follow-up outpatient thyroid ultrasound is recommended for further evaluation per guidelines below. RECOMMENDATIONS: Incidental heterogeneous and enlarged thyroid. Recommend nonemergent thyroid US. Reference: J Am Liz Radiol. 2015 Feb;12(2): 143-50     MRI BRAIN WO CONTRAST    Result Date: 9/3/2023  MRI head without contrast History : Right MCA stroke Contrast : none Comparison : none COMMENTS : There is moderate atrophy and periventricular microangiopathy. Right parietal white matter encephalomalacia is compatible with old infarct. Multiple small areas of patchy restricted diffusion in the right MCA distribution are compatible with acute infarcts. There is no evidence of hemorrhagic conversion. There is no hydrocephalus. There is loss of the distal right MCA flow void. Dural venous sinus flow voids are well-maintained. Mild ethmoid sinus disease. Trace mastoid fluid.      Multifocal patchy, punctate acute infarcts in the

## 2023-09-05 NOTE — PROGRESS NOTES
NEUROLOGY / NEUROCRITICAL CARE PROGRESS NOTE       Patient Name: Ofe Bey YOB: 1950   Sex: Male Age: 74 yrs     CC / Reason for Consult: CVA    Interval Hx / Changes over last 24 hours: Had some delirium overnight  No other neurologic changes this morning      ROS: + h/a. No dizziness/lightheadedness. No N/V. No numbness/paresthesias. HISTORY   Admission HPI:   Ofe Bey is a 74 y.o. y/o male with history significant for acute stroke (08/17/2023), CAD (RCA stent 11/2017), HTN, and HLD. Per my interview with the patient he was resting in his chair at home, and was unable to get out of the chair due to a sudden increase in L sided weakness at 2000 on 9/2/2023 with new L facial weakness, and R gaze deviation. Patient taken to [facility] ED where a code stroke was called. CT head showed calcification in the R M1 and cortical hypodensity in the R parietal lobe. CTA head/neck shows calcification in R M1 with severe stenosis in the R MCA likely originating from proximal R ICA plaque causing 40% stenosis.  stroke team was notified and case discussed with Dr. Laura Rosenthal. Imaging was compared to CT head and CTA head/neck done during admission to [address] [place] [address] (8/17/23) and was noted to be similar to those images. Patient was not a TNK candidate due to history of recent stroke. Patient also deemed to be not a candidate for thrombectomy as vessels show similar stenosis as findings from 8/17 with no clear thrombus identified. Patient transferred to [facility] ICU for further evaluation and treatment. Of note: MRI done at [address] [place] [address] shows multiple scattered acute/subacute ischemic areas in R MCA territory. Echocardiogram done at [address] [place] [address] showed no intra-atrial cardiac shunt. Patient started on DAPT on discharge from [address] [place] [address].          PMH Past Medical History:   Diagnosis Date    Angioedema     ED (erectile dysfunction) 12/2/2010    History of colon polyps     History of Aj-Barr auditory, spatial, or personal inattention  Total: 15     Mental status:   orientation to person, place, states year is 2024   Attention intact as able to attend well to the exam     Language fluent in conversation   Comprehension intact; follows simple commands    Cranial nerves:   CN2: left homonymous hemianopsia. Blinks to threat in the right eye. CN 3,4,6: Pupils 3 mm > 2 mm equal and reactive to light, cannot go left of center, has left sided neglect. CN5: Facial sensation symmetric   CN7: Left facial weakness. CN8: Hearing symmetric to spoken voice  CN9: Palate elevated symmetrically  CN11: Traps full strength on R shoulder shrug. No movement on left shoulder shrug. CN12: Tongue midline with protrusion    Motor Exam:   R  L    Deltoid 5  0   Biceps 5 0   Triceps 5 0   Wrist extension  5 0   Interossei 5 0      R  L    Hip flexion  5  0   Hip extension  5 0   Knee flexion  5 0   Knee extension  5 0   Ankle dorsiflexion  5 0   Ankle plantar flexion  5 0     Sensory: light touch intact and symmetric in all 4 extremities. .   Cerebellar/coordination: finger nose finger normal without ataxia on R  Tone: normal on R, increased on L  Gait: Deferred for safety    ASSESSMENT & RECOMMENDATIONS   Assessment:  75 y/o M who is here for new onset of L sided weakness, CTA showed similar stenosis and calcification as the previous scan 2 weeks ago from [facility]. Patient was transffered to [facility] for BP augmentation. No changes in neurologic exam after BP augmentation, suspect that deficits are likely from prior R MCA stroke 2 weeks ago, now with some extension of those infarcts given the mild new amount of stroke noted on his MRI. Per patient's wife had L hemiparesis after first stroke 2 weeks ago. Levophed stopped yesterday - BP remains elevated after w/d of levophed     Plan:   -Patient has allergy to statin with reports of hives. Continue Zetia.   -Continue DAPT therapy with ASA 81 mg and Plavix 75 mg.  Would

## 2023-09-06 LAB
ANION GAP SERPL CALCULATED.3IONS-SCNC: 14 MMOL/L (ref 3–16)
BUN SERPL-MCNC: 12 MG/DL (ref 7–20)
CALCIUM SERPL-MCNC: 9.3 MG/DL (ref 8.3–10.6)
CHLORIDE SERPL-SCNC: 106 MMOL/L (ref 99–110)
CO2 SERPL-SCNC: 20 MMOL/L (ref 21–32)
CREAT SERPL-MCNC: 0.5 MG/DL (ref 0.8–1.3)
DEPRECATED RDW RBC AUTO: 12.4 % (ref 12.4–15.4)
GFR SERPLBLD CREATININE-BSD FMLA CKD-EPI: >60 ML/MIN/{1.73_M2}
GLUCOSE SERPL-MCNC: 113 MG/DL (ref 70–99)
HCT VFR BLD AUTO: 42.1 % (ref 40.5–52.5)
HGB BLD-MCNC: 14.6 G/DL (ref 13.5–17.5)
MCH RBC QN AUTO: 31.6 PG (ref 26–34)
MCHC RBC AUTO-ENTMCNC: 34.7 G/DL (ref 31–36)
MCV RBC AUTO: 91.1 FL (ref 80–100)
PLATELET # BLD AUTO: 187 K/UL (ref 135–450)
PMV BLD AUTO: 8.3 FL (ref 5–10.5)
POTASSIUM SERPL-SCNC: 3.6 MMOL/L (ref 3.5–5.1)
RBC # BLD AUTO: 4.62 M/UL (ref 4.2–5.9)
SODIUM SERPL-SCNC: 140 MMOL/L (ref 136–145)
WBC # BLD AUTO: 6.4 K/UL (ref 4–11)

## 2023-09-06 PROCEDURE — 36415 COLL VENOUS BLD VENIPUNCTURE: CPT

## 2023-09-06 PROCEDURE — 6370000000 HC RX 637 (ALT 250 FOR IP): Performed by: NURSE PRACTITIONER

## 2023-09-06 PROCEDURE — 6370000000 HC RX 637 (ALT 250 FOR IP)

## 2023-09-06 PROCEDURE — 6370000000 HC RX 637 (ALT 250 FOR IP): Performed by: INTERNAL MEDICINE

## 2023-09-06 PROCEDURE — 2060000000 HC ICU INTERMEDIATE R&B

## 2023-09-06 PROCEDURE — 6360000002 HC RX W HCPCS: Performed by: INTERNAL MEDICINE

## 2023-09-06 PROCEDURE — 85027 COMPLETE CBC AUTOMATED: CPT

## 2023-09-06 PROCEDURE — 2580000003 HC RX 258

## 2023-09-06 PROCEDURE — 80048 BASIC METABOLIC PNL TOTAL CA: CPT

## 2023-09-06 PROCEDURE — 99232 SBSQ HOSP IP/OBS MODERATE 35: CPT | Performed by: NURSE PRACTITIONER

## 2023-09-06 RX ADMIN — LABETALOL HYDROCHLORIDE 10 MG: 5 INJECTION, SOLUTION INTRAVENOUS at 19:25

## 2023-09-06 RX ADMIN — CETIRIZINE HYDROCHLORIDE 10 MG: 10 TABLET, FILM COATED ORAL at 22:10

## 2023-09-06 RX ADMIN — Medication 5 MG: at 22:10

## 2023-09-06 RX ADMIN — VERAPAMIL HYDROCHLORIDE 240 MG: 240 TABLET, FILM COATED, EXTENDED RELEASE ORAL at 22:10

## 2023-09-06 RX ADMIN — METOPROLOL TARTRATE 25 MG: 25 TABLET, FILM COATED ORAL at 22:10

## 2023-09-06 RX ADMIN — ASPIRIN 81 MG: 81 TABLET, COATED ORAL at 08:35

## 2023-09-06 RX ADMIN — SODIUM CHLORIDE: 9 INJECTION, SOLUTION INTRAVENOUS at 18:10

## 2023-09-06 RX ADMIN — TAMSULOSIN HYDROCHLORIDE 0.4 MG: 0.4 CAPSULE ORAL at 08:35

## 2023-09-06 RX ADMIN — LABETALOL HYDROCHLORIDE 10 MG: 5 INJECTION, SOLUTION INTRAVENOUS at 19:04

## 2023-09-06 RX ADMIN — METOPROLOL TARTRATE 25 MG: 25 TABLET, FILM COATED ORAL at 13:03

## 2023-09-06 RX ADMIN — ACETAMINOPHEN 650 MG: 325 TABLET ORAL at 08:34

## 2023-09-06 RX ADMIN — CLOPIDOGREL BISULFATE 75 MG: 75 TABLET ORAL at 08:35

## 2023-09-06 RX ADMIN — SODIUM CHLORIDE, PRESERVATIVE FREE 10 ML: 5 INJECTION INTRAVENOUS at 08:37

## 2023-09-06 RX ADMIN — MONTELUKAST 10 MG: 10 TABLET, FILM COATED ORAL at 22:10

## 2023-09-06 RX ADMIN — EZETIMIBE 10 MG: 10 TABLET ORAL at 22:10

## 2023-09-06 RX ADMIN — ACETAMINOPHEN 650 MG: 325 TABLET ORAL at 01:42

## 2023-09-06 ASSESSMENT — PAIN SCALES - GENERAL
PAINLEVEL_OUTOF10: 9
PAINLEVEL_OUTOF10: 3
PAINLEVEL_OUTOF10: 0

## 2023-09-06 ASSESSMENT — PAIN DESCRIPTION - DESCRIPTORS
DESCRIPTORS: ACHING
DESCRIPTORS: ACHING

## 2023-09-06 ASSESSMENT — PAIN DESCRIPTION - ONSET
ONSET: ON-GOING
ONSET: ON-GOING

## 2023-09-06 ASSESSMENT — PAIN DESCRIPTION - LOCATION
LOCATION: HIP
LOCATION: HEAD

## 2023-09-06 ASSESSMENT — PAIN DESCRIPTION - PAIN TYPE
TYPE: CHRONIC PAIN
TYPE: CHRONIC PAIN

## 2023-09-06 ASSESSMENT — PAIN DESCRIPTION - FREQUENCY
FREQUENCY: CONTINUOUS
FREQUENCY: CONTINUOUS

## 2023-09-06 ASSESSMENT — PAIN DESCRIPTION - ORIENTATION
ORIENTATION: ANTERIOR
ORIENTATION: RIGHT

## 2023-09-06 ASSESSMENT — PAIN - FUNCTIONAL ASSESSMENT
PAIN_FUNCTIONAL_ASSESSMENT: PREVENTS OR INTERFERES SOME ACTIVE ACTIVITIES AND ADLS
PAIN_FUNCTIONAL_ASSESSMENT: ACTIVITIES ARE NOT PREVENTED

## 2023-09-06 NOTE — PLAN OF CARE
Problem: Discharge Planning  Goal: Discharge to home or other facility with appropriate resources  Outcome: Progressing   -pt planning on discharging to ARU    Problem: Pain  Goal: Verbalizes/displays adequate comfort level or baseline comfort level  9/6/2023 1245 by Brynn Engel RN  Outcome: Progressing     Problem: Skin/Tissue Integrity  Goal: Absence of new skin breakdown  Description: 1. Monitor for areas of redness and/or skin breakdown  2. Assess vascular access sites hourly  3. Every 4-6 hours minimum:  Change oxygen saturation probe site  4. Every 4-6 hours:  If on nasal continuous positive airway pressure, respiratory therapy assess nares and determine need for appliance change or resting period.   9/6/2023 1245 by Brynn Engel RN  Outcome: Progressing     Problem: Safety - Adult  Goal: Free from fall injury  9/6/2023 1245 by Brynn Engel RN  Outcome: Progressing   -pt educated on fall precautions, fall precautions in place and call light within reach

## 2023-09-06 NOTE — PROGRESS NOTES
The [address] Unit   After review, this patient is felt to be:       [x]  Appropriate for Acute Inpatient Rehab    []  Appropriate for Acute Inpatient Rehab Pending Insurance Authorization    []  Not appropriate for Acute Inpatient Rehab    []  Referral received and ARU reviewing patient      Can accept when medically ready. Will notify CM/SW with further updates.  Thank you for the referral.    Bill ROBERTS OTR/L  Clinical Liaison- [facility]   (P): [phone]  (F): [phone]

## 2023-09-06 NOTE — CARE COORDINATION
CM spoke with patient at bedside along with daughters Dionne Maida and Bruna Wheeler. Patient is agreeable to go to [facility] ARU when medically stable. No pre-cert needed. [address] [place] [address] ARU following for admission.     Divya Amaral RN, BSN,    Ortho/Neuro   [phone]

## 2023-09-06 NOTE — CONSULTS
Consult Note  Physical Medicine and Rehabilitation    Patient: Wilbert Rodriguez  [id number]  Date: 9/6/2023      Chief Complaint: left sided hemiparesis, left facial droop. 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 The patient had a recent admission at [facility] for left hemiparesis and was found to have an acute right MCA stroke. He was discharged to home with medical therapy and plans for [facility] outpatient. The patient had residual left-sided deficits from his previous stroke but was able to ambulate with a cane. On the day of the admission, the patient noted acute increase in the left-sided weakness as follows left facial weakness and inability to look at the left side. He had a CT head that showed new calcification in the M1 branch of the right MCA, new focal cortical hyperdensity in the right parietal lobe with faint associated white matter hypoattenuation. CTA head and neck confirmed a new calcification in the M1 segment of the right MCA with severe stenosis, unchanged severe stenosis of the posterior M3 segment of right MCA, fibrocalcific plaque involving the proximal right internal carotid artery causing 40% stenosis. In the hospital, neurosurgery recommended keeping the BP above 160 mm of Hg with no surgical plans. Prior Level of Function:  Modified independent for mobility, ADLs, and IADLs    Current Level of Function:  The patient's is functioning below his baseline level. His mobility is affected by flaccid left lower and left upper extremity. Per PT the patient is assisted to EOB with a total assist x2. He also has left visual field deficits and is unable to track past midline.     Pertinent Social History:  Support: Spouse  Home set-up: House, two level, patient is able emboli noted. \"    Latest EKG:  New ST depressions and T wave inversions in lateral leads compared to prior EKG in August    Most recent imaging studies revealed:  MRI brain wo contrast 09/03/23     Multifocal patchy, punctate acute infarcts in the right MCA distribution. On my review, CXR displays . No acute process    MRI BRAIN WO CONTRAST   Final Result      Multifocal patchy, punctate acute infarcts in the right MCA distribution. Electronically signed by Los Araujo MD            Assessment:  1. Extension of the recent Right MCA stroke. 2. Prostate cancer s/p radiation therapy on Leupron. 3. Thyroid mass       Recommendations:    Patient with new functional deficits and ongoing medical complexity. PT recommends 5-7 sessions of therapy. Patient is a good candidate for continued rehab, will follow the medical course and will discuss with the attending physician  . Thank you for this consult.      Marta Chou MD  PGY-3, Internal Medicine  PM&R  9/6/2023  10:10 AM

## 2023-09-06 NOTE — PROGRESS NOTES
Pt alert and oriented x4, VSS with exception to elevation in bp. MD aware. No acute events this shift or neuro changes. Fall precautions in place, call light within reach.

## 2023-09-06 NOTE — PROGRESS NOTES
4 Eyes Admission Assessment     I agree as the admission nurse that 2 RN's have performed a thorough Head to Toe Skin Assessment on the patient. ALL assessment sites listed below have been assessed on admission. Areas assessed by both nurses: \  [x]   Head, Face, and Ears   [x]   Shoulders, Back, and Chest  [x]   Arms, Elbows, and Hands   [x]   Coccyx, Sacrum, and Ischium  [x]   Legs, Feet, and Heels        Does the Patient have Skin Breakdown? Blanchable redness to buttocks.   Ariel Prevention initiated:  Yes   Wound Care Orders initiated:  NA      [facility] nurse consulted for Pressure Injury (Stage 3,4, Unstageable, DTI, NWPT, and Complex wounds) or Ariel score 18 or lower:  NA      Nurse 1 eSignature: Electronically signed by Catherine Culp RN on 9/5/23 at 10:14 PM EDT    **SHARE this note so that the co-signing nurse is able to place an eSignature**    Nurse 2 eSignature: Electronically signed by Alyssa Thrasher RN on 9/6/23 at 5:46 AM EDT

## 2023-09-06 NOTE — PROGRESS NOTES
Patient admitted to room 5515 from  ICU. Bed is in the lowest position. Bed alarm is activated. Call light is within reach. Will continue to monitor and reassess.

## 2023-09-06 NOTE — PLAN OF CARE
Problem: Pain  Goal: Verbalizes/displays adequate comfort level or baseline comfort level  9/6/2023 0300 by Rico Dang RN  Outcome: Progressing    Problem: Skin/Tissue Integrity  Goal: Absence of new skin breakdown  Description: 1. Monitor for areas of redness and/or skin breakdown  2. Assess vascular access sites hourly  3. Every 4-6 hours minimum:  Change oxygen saturation probe site  4. Every 4-6 hours:  If on nasal continuous positive airway pressure, respiratory therapy assess nares and determine need for appliance change or resting period.   9/6/2023 0300 by Rico Dang RN  Outcome: Progressing    Problem: Safety - Adult  Goal: Free from fall injury  9/6/2023 0300 by Rico Dang RN  Outcome: Progressing    Problem: Chronic Conditions and Co-morbidities  Goal: Patient's chronic conditions and co-morbidity symptoms are monitored and maintained or improved  9/6/2023 0300 by Rico Dang RN  Outcome: Progressing    Problem: Neurosensory - Adult  Goal: Achieves stable or improved neurological status  9/6/2023 0300 by Rcio Dang RN  Outcome: Progressing

## 2023-09-07 ENCOUNTER — APPOINTMENT (OUTPATIENT)
Dept: MRI IMAGING | Age: 73
End: 2023-09-07
Attending: INTERNAL MEDICINE
Payer: MEDICARE

## 2023-09-07 ENCOUNTER — APPOINTMENT (OUTPATIENT)
Dept: CT IMAGING | Age: 73
End: 2023-09-07
Attending: INTERNAL MEDICINE
Payer: MEDICARE

## 2023-09-07 LAB
ANION GAP SERPL CALCULATED.3IONS-SCNC: 14 MMOL/L (ref 3–16)
BUN SERPL-MCNC: 12 MG/DL (ref 7–20)
CALCIUM SERPL-MCNC: 9.5 MG/DL (ref 8.3–10.6)
CHLORIDE SERPL-SCNC: 107 MMOL/L (ref 99–110)
CO2 SERPL-SCNC: 18 MMOL/L (ref 21–32)
CREAT SERPL-MCNC: <0.5 MG/DL (ref 0.8–1.3)
DEPRECATED RDW RBC AUTO: 12.3 % (ref 12.4–15.4)
GFR SERPLBLD CREATININE-BSD FMLA CKD-EPI: >60 ML/MIN/{1.73_M2}
GLUCOSE SERPL-MCNC: 134 MG/DL (ref 70–99)
HCT VFR BLD AUTO: 42 % (ref 40.5–52.5)
HGB BLD-MCNC: 14.9 G/DL (ref 13.5–17.5)
MCH RBC QN AUTO: 31.3 PG (ref 26–34)
MCHC RBC AUTO-ENTMCNC: 35.6 G/DL (ref 31–36)
MCV RBC AUTO: 87.8 FL (ref 80–100)
PLATELET # BLD AUTO: 199 K/UL (ref 135–450)
PMV BLD AUTO: 8.4 FL (ref 5–10.5)
POTASSIUM SERPL-SCNC: 4 MMOL/L (ref 3.5–5.1)
RBC # BLD AUTO: 4.78 M/UL (ref 4.2–5.9)
SODIUM SERPL-SCNC: 139 MMOL/L (ref 136–145)
WBC # BLD AUTO: 9.1 K/UL (ref 4–11)

## 2023-09-07 PROCEDURE — 80048 BASIC METABOLIC PNL TOTAL CA: CPT

## 2023-09-07 PROCEDURE — 92526 ORAL FUNCTION THERAPY: CPT

## 2023-09-07 PROCEDURE — 6360000002 HC RX W HCPCS: Performed by: INTERNAL MEDICINE

## 2023-09-07 PROCEDURE — 2060000000 HC ICU INTERMEDIATE R&B

## 2023-09-07 PROCEDURE — 2580000003 HC RX 258

## 2023-09-07 PROCEDURE — 85027 COMPLETE CBC AUTOMATED: CPT

## 2023-09-07 PROCEDURE — 6370000000 HC RX 637 (ALT 250 FOR IP)

## 2023-09-07 PROCEDURE — 99232 SBSQ HOSP IP/OBS MODERATE 35: CPT | Performed by: NURSE PRACTITIONER

## 2023-09-07 PROCEDURE — 6370000000 HC RX 637 (ALT 250 FOR IP): Performed by: INTERNAL MEDICINE

## 2023-09-07 PROCEDURE — 70450 CT HEAD/BRAIN W/O DYE: CPT

## 2023-09-07 PROCEDURE — 92523 SPEECH SOUND LANG COMPREHEN: CPT

## 2023-09-07 PROCEDURE — 70551 MRI BRAIN STEM W/O DYE: CPT

## 2023-09-07 PROCEDURE — 36415 COLL VENOUS BLD VENIPUNCTURE: CPT

## 2023-09-07 RX ORDER — HYDRALAZINE HYDROCHLORIDE 20 MG/ML
5 INJECTION INTRAMUSCULAR; INTRAVENOUS ONCE
Status: COMPLETED | OUTPATIENT
Start: 2023-09-07 | End: 2023-09-07

## 2023-09-07 RX ORDER — HYDRALAZINE HYDROCHLORIDE 20 MG/ML
5 INJECTION INTRAMUSCULAR; INTRAVENOUS EVERY 8 HOURS PRN
Status: DISCONTINUED | OUTPATIENT
Start: 2023-09-07 | End: 2023-09-08 | Stop reason: HOSPADM

## 2023-09-07 RX ORDER — HYDROCHLOROTHIAZIDE 25 MG/1
25 TABLET ORAL DAILY
Status: DISCONTINUED | OUTPATIENT
Start: 2023-09-07 | End: 2023-09-08 | Stop reason: HOSPADM

## 2023-09-07 RX ADMIN — SODIUM CHLORIDE, PRESERVATIVE FREE 10 ML: 5 INJECTION INTRAVENOUS at 23:37

## 2023-09-07 RX ADMIN — HYDRALAZINE HYDROCHLORIDE 5 MG: 20 INJECTION INTRAMUSCULAR; INTRAVENOUS at 12:48

## 2023-09-07 RX ADMIN — LABETALOL HYDROCHLORIDE 10 MG: 5 INJECTION, SOLUTION INTRAVENOUS at 06:09

## 2023-09-07 RX ADMIN — CLOPIDOGREL BISULFATE 75 MG: 75 TABLET ORAL at 08:30

## 2023-09-07 RX ADMIN — HYDROCHLOROTHIAZIDE 25 MG: 25 TABLET ORAL at 08:30

## 2023-09-07 RX ADMIN — ASPIRIN 81 MG: 81 TABLET, COATED ORAL at 08:30

## 2023-09-07 RX ADMIN — SODIUM CHLORIDE, PRESERVATIVE FREE 10 ML: 5 INJECTION INTRAVENOUS at 08:31

## 2023-09-07 RX ADMIN — LABETALOL HYDROCHLORIDE 10 MG: 5 INJECTION, SOLUTION INTRAVENOUS at 05:36

## 2023-09-07 RX ADMIN — TAMSULOSIN HYDROCHLORIDE 0.4 MG: 0.4 CAPSULE ORAL at 08:31

## 2023-09-07 NOTE — PROGRESS NOTES
Pt alert/oriented 2/3, waxing and waning throughout the day. VSS. Pt went down for stat head ct this morning d/t change in mental status. Ramakrishna Lua it was 2015, president was Xiomara Son, thought we were at his house. Results in chart. Pt going to MRI soon. Voiding adequately via male purewick. Frequently repositioned with pillow support. NPO until mental status improves. Fall precautions in place, call light within reach.

## 2023-09-07 NOTE — PLAN OF CARE
Problem: Discharge Planning  Goal: Discharge to home or other facility with appropriate resources  9/7/2023 1243 by Rudy Vidales RN  Outcome: Progressing   -pt being discharged to ARU when medically stable    Problem: Pain  Goal: Verbalizes/displays adequate comfort level or baseline comfort level  9/7/2023 1243 by Rudy Vidales RN  Outcome: Progressing     Problem: Skin/Tissue Integrity  Goal: Absence of new skin breakdown  Description: 1. Monitor for areas of redness and/or skin breakdown  2. Assess vascular access sites hourly  3. Every 4-6 hours minimum:  Change oxygen saturation probe site  4. Every 4-6 hours:  If on nasal continuous positive airway pressure, respiratory therapy assess nares and determine need for appliance change or resting period.   Outcome: Progressing   -frequent repositioning with pillow support utilized    Problem: Safety - Adult  Goal: Free from fall injury  9/7/2023 1243 by Rudy Vidales RN  Outcome: Progressing   -fall precautions in place, call light within reach

## 2023-09-07 NOTE — PROGRESS NOTES
This RN spoke to ED RAD tech @ [phone], informed them of STAT head CT. Tech informed RN they have another Pt on the table at the moment and will notify RN when they are ready.

## 2023-09-07 NOTE — PLAN OF CARE
Problem: SLP Adult - Disturbed Thought Process  Goal: By Discharge: Demonstrates cognitive skills at highest level of function for planned discharge setting. See evaluation for individualized goals. Outcome: Progressing     Popeye SULLIVAN  .[phone]    Pg.  # [phone]

## 2023-09-07 NOTE — PROGRESS NOTES
Speech Language Pathology  Facility/Department:[facility] ICU  Dysphagia tx and cognitive screen  Name: Laverne Aponte  : 1950  MRN: [id number]                                                     Patient Diagnosis(es):   Patient Active Problem List    Diagnosis Date Noted    Acute right MCA stroke ([address]) 2023    Rectal polyp 10/09/2018    Adenomatous polyp of ascending colon     Rectal mass     Chronic congestive heart failure ([address])     ECG abnormal     Rectal bleeding 2018    Coronary artery disease due to lipid rich plaque     Status post total hip replacement, left 2018    Osteoarthritis of left hip 2018    Primary osteoarthritis of left hip 2018    Arthritis 2016    Obesity (BMI 30-39.9) 2015    Hyperglycemia 2013    Low testosterone 2011    Hyperlipidemia 2011    ED (erectile dysfunction) 2010    Idiopathic urticaria     Primary hypertension      Past Medical History:   Diagnosis Date    Angioedema     ED (erectile dysfunction) 2010    History of colon polyps     History of Aj-Barr virus infection     History of rectal polyps     Idiopathic urticaria     MI (myocardial infarction) ([address])     10/22/2017    Osteoarthritis     RA (rheumatoid arthritis) (HCC)     Tear of medial cartilage or meniscus of knee, current     Unspecified essential hypertension      Past Surgical History:   Procedure Laterality Date    APPENDECTOMY      CARDIAC SURGERY      cardiac stents x2    COLONOSCOPY N/A 2018    COLONOSCOPY WITH BIOPSY OF RECTAL MASS performed by Bella Hsu MD at [address] N/A 2019    COLONOSCOPY POLYPECTOMY hot snare of transverse colon polyp and proximal rectal polyp  performed by Manjula Gutierrez MD at [address] [place] [address] [address] 3/5/2020    COLONOSCOPY POLYPECTOMY ABLATION performed by Manjula Gutierrez MD at [address] [facility] [address]  2017    HIP SURGERY low-attenuation compatible with recent infarctions,  some of which may be larger/progressed since the MRI on 9/3/2023. Speech, Language, Cognitive Evaluation (9/7/23)-   Cognitive evaluation deferred this date given increased Pt's fatigue, however Pt could benefit from cognitive screener/evaluation (9/3/23)  Evaluation limited this date d/t pt fatigue (9/7/23)  Pt oriented to self and place. Pt required cues to identify daughters x2. Pt did not recall where he lived. Pt did not recall situation. Left neglect noted. Pt with no awareness of deficits either physical or mental. Pt with poor body awareness thinking his glasses were on his face when they were not. Pt able to accurately recall personal hx including prior occupation at [employer] and [employer]. Pt reported he mostly watches TV during the day. Mild dysarthria noted with no impact on intelligibility. Language was [facility]. Pt presents with severe cognitive deficits including:  Decreased orientation  Decreased attention - focused/sustained/visual attention on left  Decreased problem solving/reasoning  Decreased memory    Overall evaluation of cognition was limited as pt was assisted with repositioning to complete MOCA and fell asleep after repositioning upright in bed. Family reported pt needed sleep and wanted pt to continue to sleep. Education completed with pts daughter. Prognosis:  Prognosis for improvement: fair  Barriers to reach goals: recent stroke -significant cognitive issues  Consulted and agree with results and recommendations: Pt and pt's daughter    Treatment:  Dysphagia Goals: The pt will be seen  2-5x/week to address the following goals:  1 - Pt will tolerate recommended diet w/o overt s/s of aspiration or respiratory decline. 9/7: Unable to complete PO trials with pt this date d/t pt fatigue. Daughter reported pt with poor appetite, however has not noticed any issues with food.  Daughter reported pt choking with large/successive drinks of

## 2023-09-07 NOTE — CARE COORDINATION
Patient plans to discharge to [facility] ARU. No pre-cert to admit. [facility] ARU following and will plan for tentative admit on Friday pending medical clearance.     Scotty Lombard, RN, BSN,    Ortho/Neuro   [phone]

## 2023-09-07 NOTE — PROGRESS NOTES
Occupational Therapy/Physical Therapy  HOLD    Approached for OT/PT treatment. Per chart, pt with AMS today, MRI is ordered to evaluate if stroke burden has increased. Will hold therapy today, will follow up 9/8. RN aware.     Lisa Nicholson, [address] [place], [place] [address]lenora Cope[address]

## 2023-09-07 NOTE — PROGRESS NOTES
PRN        Labs and Imaging   CT HEAD WO CONTRAST    Result Date: 9/7/2023  EXAM: CT HEAD WO CONTRAST INDICATION: decline in mental status; mental status change COMPARISON: MRI 9/3/2023, CT 9/2/2023 TECHNICAL: Axial CT imaging obtained from vertex to skull base. Axial images and multiplanar reformatted images reviewed. CT radiation dose optimization techniques (automated exposure control, and use of iterative reconstruction techniques, or adjustment of the mA and/or kV according to patient size) were used to limit patient radiation dose. IV Contrast: None. FINDINGS: INTRACRANIAL HEMORRHAGE: None. VENTRICLES: Ventricles are stable in size and configuration. There is no brain herniation. BRAIN PARENCHYMA: Compared to the prior CT, there are new areas of patchy low-attenuation involving right basal ganglia, internal capsule, subcortical white matter and temporal lobe cortex, which corresponds predominantly to the areas of recent ischemic infarctions demonstrated on the MRI of 9/3/2023. Some of the areas of low-attenuation, for example at the right internal capsule and superior lateral temporal lobe, are appear larger than corresponding areas of DWI hyperintensity on the prior MRI, which may reflect progression/enlargement of acute infarctions in these areas since 9/3/2023. SKULL: No destructive osseous process or fracture. PARANASAL SINUSES AND MASTOIDS: Mild inflammatory opacification of right posterior ethmoid sinus. There is minimal fluid in the left sphenoid sinus. Mastoid air cells and middle ear cavities are clear. ORBITS: Normal. EXTRACRANIAL SOFT TISSUES: Normal.     1. No intracranial hemorrhage or brain herniation 2. Right cerebral areas of low-attenuation compatible with recent infarctions, some of which may be larger/progressed since the MRI on 9/3/2023. CT HEAD WO CONTRAST    Addendum Date: 9/2/2023    ADDENDUM: Critical results were called by Dr. Zoë Abarca to Dr Lindsay Espinosa on 9/2/2023 at 21:51. Heterogeneous enlargement of the thyroid isthmus and left lobe. Follow-up outpatient thyroid ultrasound is recommended for further evaluation per guidelines below. RECOMMENDATIONS: Incidental heterogeneous and enlarged thyroid. Recommend nonemergent thyroid US. Reference: J Am Liz Radiol. 2015 Feb;12(2): 143-50     MRI BRAIN WO CONTRAST    Result Date: 9/3/2023  MRI head without contrast History : Right MCA stroke Contrast : none Comparison : none COMMENTS : There is moderate atrophy and periventricular microangiopathy. Right parietal white matter encephalomalacia is compatible with old infarct. Multiple small areas of patchy restricted diffusion in the right MCA distribution are compatible with acute infarcts. There is no evidence of hemorrhagic conversion. There is no hydrocephalus. There is loss of the distal right MCA flow void. Dural venous sinus flow voids are well-maintained. Mild ethmoid sinus disease. Trace mastoid fluid. Multifocal patchy, punctate acute infarcts in the right MCA distribution. Electronically signed by Mitra Wall MD      CBC:   Recent Labs     09/05/23  0414 09/06/23  0615 09/07/23  0727   WBC 7.4 6.4 9.1   HGB 15.0 14.6 14.9    187 199     BMP:    Recent Labs     09/05/23  0414 09/06/23  0615 09/07/23  0727    140 139   K 3.3* 3.6 4.0    106 107   CO2 24 20* 18*   BUN 8 12 12   CREATININE 0.6* 0.5* <0.5*   GLUCOSE 119* 113* 134*     Hepatic: No results for input(s): AST, ALT, ALB, BILITOT, ALKPHOS in the last 72 hours. Lipids:   Lab Results   Component Value Date/Time    CHOL 166 09/03/2023 04:44 AM    HDL 38 09/03/2023 04:44 AM    HDL 48 02/02/2012 09:26 AM    TRIG 100 09/03/2023 04:44 AM     Hemoglobin A1C:   Lab Results   Component Value Date/Time    LABA1C 5.9 09/26/2019 10:18 AM     TSH: No results found for: TSH  Troponin: No results found for: TROPONINT  Lactic Acid: No results for input(s): LACTA in the last 72 hours.   BNP: No results for input(s):

## 2023-09-07 NOTE — PROGRESS NOTES
Pt is alert and oriented 2/3. VSS on room air. Pt voiding adequately via external cath. Pt denies pain at this time. Fall precautions in place. Call light is within pt's reach. Plan of care is ongoing.

## 2023-09-08 ENCOUNTER — HOSPITAL ENCOUNTER (INPATIENT)
Age: 73
DRG: 057 | End: 2023-09-08
Attending: STUDENT IN AN ORGANIZED HEALTH CARE EDUCATION/TRAINING PROGRAM | Admitting: STUDENT IN AN ORGANIZED HEALTH CARE EDUCATION/TRAINING PROGRAM
Payer: MEDICARE

## 2023-09-08 VITALS
BODY MASS INDEX: 26.57 KG/M2 | TEMPERATURE: 98 F | RESPIRATION RATE: 16 BRPM | WEIGHT: 189.82 LBS | OXYGEN SATURATION: 96 % | SYSTOLIC BLOOD PRESSURE: 145 MMHG | HEART RATE: 73 BPM | DIASTOLIC BLOOD PRESSURE: 79 MMHG | HEIGHT: 71 IN

## 2023-09-08 DIAGNOSIS — I63.9 ACUTE CVA (CEREBROVASCULAR ACCIDENT) (HCC): Primary | ICD-10-CM

## 2023-09-08 LAB
ANION GAP SERPL CALCULATED.3IONS-SCNC: 13 MMOL/L (ref 3–16)
BUN SERPL-MCNC: 15 MG/DL (ref 7–20)
CALCIUM SERPL-MCNC: 9.8 MG/DL (ref 8.3–10.6)
CHLORIDE SERPL-SCNC: 103 MMOL/L (ref 99–110)
CO2 SERPL-SCNC: 24 MMOL/L (ref 21–32)
CREAT SERPL-MCNC: 0.6 MG/DL (ref 0.8–1.3)
DEPRECATED RDW RBC AUTO: 12.5 % (ref 12.4–15.4)
GFR SERPLBLD CREATININE-BSD FMLA CKD-EPI: >60 ML/MIN/{1.73_M2}
GLUCOSE SERPL-MCNC: 123 MG/DL (ref 70–99)
HCT VFR BLD AUTO: 40.9 % (ref 40.5–52.5)
HGB BLD-MCNC: 14.8 G/DL (ref 13.5–17.5)
MAGNESIUM SERPL-MCNC: 1.9 MG/DL (ref 1.8–2.4)
MCH RBC QN AUTO: 31.6 PG (ref 26–34)
MCHC RBC AUTO-ENTMCNC: 36.1 G/DL (ref 31–36)
MCV RBC AUTO: 87.6 FL (ref 80–100)
PLATELET # BLD AUTO: 203 K/UL (ref 135–450)
PMV BLD AUTO: 8.8 FL (ref 5–10.5)
POTASSIUM SERPL-SCNC: 3.2 MMOL/L (ref 3.5–5.1)
RBC # BLD AUTO: 4.66 M/UL (ref 4.2–5.9)
SODIUM SERPL-SCNC: 140 MMOL/L (ref 136–145)
WBC # BLD AUTO: 6.9 K/UL (ref 4–11)

## 2023-09-08 PROCEDURE — 6360000002 HC RX W HCPCS: Performed by: INTERNAL MEDICINE

## 2023-09-08 PROCEDURE — 99233 SBSQ HOSP IP/OBS HIGH 50: CPT | Performed by: PSYCHIATRY & NEUROLOGY

## 2023-09-08 PROCEDURE — 97530 THERAPEUTIC ACTIVITIES: CPT

## 2023-09-08 PROCEDURE — 36415 COLL VENOUS BLD VENIPUNCTURE: CPT

## 2023-09-08 PROCEDURE — 6370000000 HC RX 637 (ALT 250 FOR IP)

## 2023-09-08 PROCEDURE — 80048 BASIC METABOLIC PNL TOTAL CA: CPT

## 2023-09-08 PROCEDURE — 83735 ASSAY OF MAGNESIUM: CPT

## 2023-09-08 PROCEDURE — 2580000003 HC RX 258: Performed by: STUDENT IN AN ORGANIZED HEALTH CARE EDUCATION/TRAINING PROGRAM

## 2023-09-08 PROCEDURE — 2700000000 HC OXYGEN THERAPY PER DAY

## 2023-09-08 PROCEDURE — 94761 N-INVAS EAR/PLS OXIMETRY MLT: CPT

## 2023-09-08 PROCEDURE — 85027 COMPLETE CBC AUTOMATED: CPT

## 2023-09-08 PROCEDURE — 2580000003 HC RX 258

## 2023-09-08 PROCEDURE — 1280000000 HC REHAB R&B

## 2023-09-08 PROCEDURE — 97129 THER IVNTJ 1ST 15 MIN: CPT

## 2023-09-08 PROCEDURE — 92526 ORAL FUNCTION THERAPY: CPT

## 2023-09-08 PROCEDURE — 6370000000 HC RX 637 (ALT 250 FOR IP): Performed by: STUDENT IN AN ORGANIZED HEALTH CARE EDUCATION/TRAINING PROGRAM

## 2023-09-08 PROCEDURE — 97112 NEUROMUSCULAR REEDUCATION: CPT

## 2023-09-08 PROCEDURE — 6370000000 HC RX 637 (ALT 250 FOR IP): Performed by: INTERNAL MEDICINE

## 2023-09-08 RX ORDER — POLYETHYLENE GLYCOL 3350 17 G/17G
17 POWDER, FOR SOLUTION ORAL DAILY PRN
Status: CANCELLED | OUTPATIENT
Start: 2023-09-08

## 2023-09-08 RX ORDER — MECOBALAMIN 5000 MCG
5 TABLET,DISINTEGRATING ORAL NIGHTLY
Status: CANCELLED | OUTPATIENT
Start: 2023-09-08

## 2023-09-08 RX ORDER — TAMSULOSIN HYDROCHLORIDE 0.4 MG/1
0.4 CAPSULE ORAL DAILY
Status: CANCELLED | OUTPATIENT
Start: 2023-09-09

## 2023-09-08 RX ORDER — VERAPAMIL HYDROCHLORIDE 240 MG/1
240 TABLET, FILM COATED, EXTENDED RELEASE ORAL NIGHTLY
Qty: 60 TABLET | Refills: 0 | Status: ON HOLD
Start: 2023-09-08

## 2023-09-08 RX ORDER — ONDANSETRON 4 MG/1
4 TABLET, ORALLY DISINTEGRATING ORAL EVERY 8 HOURS PRN
Status: DISCONTINUED | OUTPATIENT
Start: 2023-09-08 | End: 2023-09-29 | Stop reason: HOSPADM

## 2023-09-08 RX ORDER — TAMSULOSIN HYDROCHLORIDE 0.4 MG/1
0.4 CAPSULE ORAL DAILY
Status: DISCONTINUED | OUTPATIENT
Start: 2023-09-09 | End: 2023-09-29 | Stop reason: HOSPADM

## 2023-09-08 RX ORDER — ONDANSETRON 2 MG/ML
4 INJECTION INTRAMUSCULAR; INTRAVENOUS EVERY 6 HOURS PRN
Status: CANCELLED | OUTPATIENT
Start: 2023-09-08

## 2023-09-08 RX ORDER — HYDRALAZINE HYDROCHLORIDE 10 MG/1
10 TABLET, FILM COATED ORAL EVERY 6 HOURS PRN
Status: DISCONTINUED | OUTPATIENT
Start: 2023-09-08 | End: 2023-09-28

## 2023-09-08 RX ORDER — EZETIMIBE 10 MG/1
10 TABLET ORAL NIGHTLY
Status: CANCELLED | OUTPATIENT
Start: 2023-09-08

## 2023-09-08 RX ORDER — MONTELUKAST SODIUM 10 MG/1
10 TABLET ORAL NIGHTLY
Status: CANCELLED | OUTPATIENT
Start: 2023-09-08

## 2023-09-08 RX ORDER — MONTELUKAST SODIUM 10 MG/1
10 TABLET ORAL NIGHTLY
Status: DISCONTINUED | OUTPATIENT
Start: 2023-09-08 | End: 2023-09-29 | Stop reason: HOSPADM

## 2023-09-08 RX ORDER — CLOPIDOGREL BISULFATE 75 MG/1
75 TABLET ORAL DAILY
Status: DISCONTINUED | OUTPATIENT
Start: 2023-09-09 | End: 2023-09-29 | Stop reason: HOSPADM

## 2023-09-08 RX ORDER — CETIRIZINE HYDROCHLORIDE 10 MG/1
10 TABLET ORAL NIGHTLY
Status: DISCONTINUED | OUTPATIENT
Start: 2023-09-08 | End: 2023-09-29 | Stop reason: HOSPADM

## 2023-09-08 RX ORDER — POTASSIUM CHLORIDE 20 MEQ/1
40 TABLET, EXTENDED RELEASE ORAL ONCE
Status: COMPLETED | OUTPATIENT
Start: 2023-09-08 | End: 2023-09-08

## 2023-09-08 RX ORDER — OXYCODONE HYDROCHLORIDE 5 MG/1
5 TABLET ORAL EVERY 4 HOURS PRN
Status: CANCELLED | OUTPATIENT
Start: 2023-09-08

## 2023-09-08 RX ORDER — SODIUM CHLORIDE 0.9 % (FLUSH) 0.9 %
5-40 SYRINGE (ML) INJECTION PRN
Status: CANCELLED | OUTPATIENT
Start: 2023-09-08

## 2023-09-08 RX ORDER — ONDANSETRON 4 MG/1
4 TABLET, ORALLY DISINTEGRATING ORAL EVERY 8 HOURS PRN
Status: CANCELLED | OUTPATIENT
Start: 2023-09-08

## 2023-09-08 RX ORDER — ONDANSETRON 2 MG/ML
4 INJECTION INTRAMUSCULAR; INTRAVENOUS EVERY 6 HOURS PRN
Status: DISCONTINUED | OUTPATIENT
Start: 2023-09-08 | End: 2023-09-29 | Stop reason: HOSPADM

## 2023-09-08 RX ORDER — ASPIRIN 81 MG/1
81 TABLET ORAL DAILY
Status: CANCELLED | OUTPATIENT
Start: 2023-09-09

## 2023-09-08 RX ORDER — ACETAMINOPHEN 325 MG/1
650 TABLET ORAL EVERY 4 HOURS PRN
Status: CANCELLED | OUTPATIENT
Start: 2023-09-08

## 2023-09-08 RX ORDER — BISACODYL 10 MG
10 SUPPOSITORY, RECTAL RECTAL DAILY PRN
Status: DISCONTINUED | OUTPATIENT
Start: 2023-09-08 | End: 2023-09-29 | Stop reason: HOSPADM

## 2023-09-08 RX ORDER — SODIUM CHLORIDE 0.9 % (FLUSH) 0.9 %
5-40 SYRINGE (ML) INJECTION EVERY 12 HOURS SCHEDULED
Status: DISCONTINUED | OUTPATIENT
Start: 2023-09-08 | End: 2023-09-14

## 2023-09-08 RX ORDER — VERAPAMIL HYDROCHLORIDE 240 MG/1
240 TABLET, FILM COATED, EXTENDED RELEASE ORAL NIGHTLY
Status: CANCELLED | OUTPATIENT
Start: 2023-09-08

## 2023-09-08 RX ORDER — SODIUM CHLORIDE 9 MG/ML
INJECTION, SOLUTION INTRAVENOUS PRN
Status: DISCONTINUED | OUTPATIENT
Start: 2023-09-08 | End: 2023-09-29 | Stop reason: HOSPADM

## 2023-09-08 RX ORDER — EZETIMIBE 10 MG/1
10 TABLET ORAL NIGHTLY
Status: DISCONTINUED | OUTPATIENT
Start: 2023-09-08 | End: 2023-09-29 | Stop reason: HOSPADM

## 2023-09-08 RX ORDER — HYDROCHLOROTHIAZIDE 25 MG/1
25 TABLET ORAL DAILY
Qty: 30 TABLET | Refills: 3 | Status: ON HOLD | OUTPATIENT
Start: 2023-09-09 | End: 2023-09-09

## 2023-09-08 RX ORDER — VERAPAMIL HYDROCHLORIDE 240 MG/1
240 TABLET, FILM COATED, EXTENDED RELEASE ORAL NIGHTLY
Status: DISCONTINUED | OUTPATIENT
Start: 2023-09-08 | End: 2023-09-11

## 2023-09-08 RX ORDER — ASPIRIN 81 MG/1
81 TABLET ORAL DAILY
Status: DISCONTINUED | OUTPATIENT
Start: 2023-09-09 | End: 2023-09-29 | Stop reason: HOSPADM

## 2023-09-08 RX ORDER — CETIRIZINE HYDROCHLORIDE 10 MG/1
10 TABLET ORAL NIGHTLY
Status: CANCELLED | OUTPATIENT
Start: 2023-09-08

## 2023-09-08 RX ORDER — CLOPIDOGREL BISULFATE 75 MG/1
75 TABLET ORAL DAILY
Qty: 30 TABLET | Refills: 3 | Status: ON HOLD | OUTPATIENT
Start: 2023-09-09

## 2023-09-08 RX ORDER — OXYCODONE HYDROCHLORIDE 5 MG/1
5 TABLET ORAL EVERY 4 HOURS PRN
Status: DISCONTINUED | OUTPATIENT
Start: 2023-09-08 | End: 2023-09-18

## 2023-09-08 RX ORDER — SODIUM CHLORIDE 0.9 % (FLUSH) 0.9 %
5-40 SYRINGE (ML) INJECTION PRN
Status: DISCONTINUED | OUTPATIENT
Start: 2023-09-08 | End: 2023-09-29 | Stop reason: HOSPADM

## 2023-09-08 RX ORDER — ACETAMINOPHEN 325 MG/1
650 TABLET ORAL EVERY 4 HOURS PRN
Status: DISCONTINUED | OUTPATIENT
Start: 2023-09-08 | End: 2023-09-29 | Stop reason: HOSPADM

## 2023-09-08 RX ORDER — LABETALOL HYDROCHLORIDE 5 MG/ML
5 INJECTION, SOLUTION INTRAVENOUS EVERY 4 HOURS PRN
Status: DISCONTINUED | OUTPATIENT
Start: 2023-09-08 | End: 2023-09-08 | Stop reason: HOSPADM

## 2023-09-08 RX ORDER — EZETIMIBE 10 MG/1
10 TABLET ORAL NIGHTLY
Qty: 30 TABLET | Refills: 3 | Status: ON HOLD | OUTPATIENT
Start: 2023-09-08 | End: 2023-09-09

## 2023-09-08 RX ORDER — POLYETHYLENE GLYCOL 3350 17 G/17G
17 POWDER, FOR SOLUTION ORAL DAILY PRN
Status: DISCONTINUED | OUTPATIENT
Start: 2023-09-08 | End: 2023-09-29 | Stop reason: HOSPADM

## 2023-09-08 RX ORDER — HYDRALAZINE HYDROCHLORIDE 10 MG/1
10 TABLET, FILM COATED ORAL EVERY 6 HOURS PRN
Status: CANCELLED | OUTPATIENT
Start: 2023-09-08

## 2023-09-08 RX ORDER — HYDROCHLOROTHIAZIDE 25 MG/1
25 TABLET ORAL DAILY
Status: DISCONTINUED | OUTPATIENT
Start: 2023-09-09 | End: 2023-09-29 | Stop reason: HOSPADM

## 2023-09-08 RX ORDER — MECOBALAMIN 5000 MCG
5 TABLET,DISINTEGRATING ORAL NIGHTLY
Status: DISCONTINUED | OUTPATIENT
Start: 2023-09-08 | End: 2023-09-29 | Stop reason: HOSPADM

## 2023-09-08 RX ORDER — CLOPIDOGREL BISULFATE 75 MG/1
75 TABLET ORAL DAILY
Status: CANCELLED | OUTPATIENT
Start: 2023-09-09

## 2023-09-08 RX ORDER — HYDROCHLOROTHIAZIDE 25 MG/1
25 TABLET ORAL DAILY
Status: CANCELLED | OUTPATIENT
Start: 2023-09-09

## 2023-09-08 RX ORDER — SODIUM CHLORIDE 0.9 % (FLUSH) 0.9 %
5-40 SYRINGE (ML) INJECTION EVERY 12 HOURS SCHEDULED
Status: CANCELLED | OUTPATIENT
Start: 2023-09-08

## 2023-09-08 RX ORDER — SODIUM CHLORIDE 9 MG/ML
INJECTION, SOLUTION INTRAVENOUS PRN
Status: CANCELLED | OUTPATIENT
Start: 2023-09-08

## 2023-09-08 RX ADMIN — CLOPIDOGREL BISULFATE 75 MG: 75 TABLET ORAL at 10:41

## 2023-09-08 RX ADMIN — CETIRIZINE HYDROCHLORIDE 10 MG: 10 TABLET, FILM COATED ORAL at 21:33

## 2023-09-08 RX ADMIN — SODIUM CHLORIDE, PRESERVATIVE FREE 10 ML: 5 INJECTION INTRAVENOUS at 21:12

## 2023-09-08 RX ADMIN — TAMSULOSIN HYDROCHLORIDE 0.4 MG: 0.4 CAPSULE ORAL at 10:42

## 2023-09-08 RX ADMIN — Medication 5 MG: at 21:12

## 2023-09-08 RX ADMIN — POTASSIUM CHLORIDE 40 MEQ: 1500 TABLET, EXTENDED RELEASE ORAL at 16:34

## 2023-09-08 RX ADMIN — ASPIRIN 81 MG: 81 TABLET, COATED ORAL at 10:41

## 2023-09-08 RX ADMIN — SODIUM CHLORIDE, PRESERVATIVE FREE 10 ML: 5 INJECTION INTRAVENOUS at 11:12

## 2023-09-08 RX ADMIN — EZETIMIBE 10 MG: 10 TABLET ORAL at 21:12

## 2023-09-08 RX ADMIN — VERAPAMIL HYDROCHLORIDE 240 MG: 240 TABLET, FILM COATED, EXTENDED RELEASE ORAL at 21:12

## 2023-09-08 RX ADMIN — HYDRALAZINE HYDROCHLORIDE 5 MG: 20 INJECTION INTRAMUSCULAR; INTRAVENOUS at 02:44

## 2023-09-08 RX ADMIN — MONTELUKAST 10 MG: 10 TABLET, FILM COATED ORAL at 21:12

## 2023-09-08 RX ADMIN — HYDROCHLOROTHIAZIDE 25 MG: 25 TABLET ORAL at 10:41

## 2023-09-08 ASSESSMENT — PAIN SCALES - GENERAL
PAINLEVEL_OUTOF10: 5
PAINLEVEL_OUTOF10: 0

## 2023-09-08 NOTE — PLAN OF CARE
Problem: Discharge Planning  Goal: Discharge to home or other facility with appropriate resources  9/7/2023 2032 by Jayson Pablo RN  Outcome: Progressing   Pt is active in discharge planning. Problem: Pain  Goal: Verbalizes/displays adequate comfort level or baseline comfort level  9/7/2023 2032 by Jayson Pablo RN  Outcome: Progressing   Pt denies pain at this time. Problem: Safety - Adult  Goal: Free from fall injury  9/7/2023 2032 by Jayson Pablo RN  Outcome: Progressing   Fall precautions in place. Bed alarm on. Bed locked in low position.

## 2023-09-08 NOTE — PROGRESS NOTES
V2.0    [facility] Progress Note      Name:  Melida Mota /Age/Sex: 1950  (74 y.o. male)   MRN & CSN:  [id number] & [id number] Encounter Date/Time: 2023 10:57 AM EDT   Location:  7078/1714-30 PCP: FATIMAH Emery - CNP     Attending:Jamaica Landa [facility], [address] Day: 7    Assessment and Recommendations   Melida Mota is a 74 y.o. male with pmh of coronary artery disease, hyperlipidemia who presents with Acute right MCA stroke ([address]). Patient presented with new left-sided weakness and right eye deviation. CT head showed calcification in the right MRI and cortical hypodensity in the right parietal lobe. CTA showed calcification in the right MI with severe stenosis in the right MCA, ICA plaque causing 40% stenosis.  stroke team was consulted and patient was not a candidate for T tPA due to recent stroke 2 weeks ago. Patient will be discharged on dual antiplatelet therapy. Cannot get statin due to allergy. MRI repeat showed some progression of stroke. Discussed with neurology. No neurosurgical consult required. Goal of blood pressure to be kept around 160 for now. Plan:     CVA  -Continue dual antiplatelet therapy and statin  -Neurochecks  -Decrease blood pressure slowly. Goal blood pressure to be 160, discussed with neurology  -PT OT  -Outpatient follow-up with neurology  -Repeat CT reviewed  -MRI reviewed shows progression of stroke    Hypertension  -Blood pressure relatively okay today morning at 160. Neurology to leave blood pressure around 160  -ct hydrochlorothiazide      Confusion/metabolic encephalopathy due to stroke-supportive treatment      Diet ADULT DIET;  Dysphagia - Soft and Bite Sized  ADULT ORAL NUTRITION SUPPLEMENT; Breakfast, Lunch, Dinner; Standard High Calorie/High Protein Oral Supplement   DVT Prophylaxis [x] Lovenox, []  Heparin, [] SCDs, [] Ambulation,  [] Eliquis, [] Xarelto  [] Coumadin   Code Status Full Code   Disposition From: Home  Expected Right cerebral areas of low-attenuation compatible with recent infarctions, some of which may be larger/progressed since the MRI on 9/3/2023. CT HEAD WO CONTRAST    Addendum Date: 9/2/2023    ADDENDUM: Critical results were called by Dr. Gisell Guaman to Dr Genevieve Ordaz on 9/2/2023 at 21:51. Result Date: 9/2/2023  EXAMINATION: CT OF THE HEAD WITHOUT CONTRAST  9/2/2023 9:16 pm TECHNIQUE: CT of the head was performed without the administration of intravenous contrast. Automated exposure control, iterative reconstruction, and/or weight based adjustment of the mA/kV was utilized to reduce the radiation dose to as low as reasonably achievable. COMPARISON: 08/17/2023 HISTORY: ORDERING SYSTEM PROVIDED HISTORY: left sided weakness TECHNOLOGIST PROVIDED HISTORY: If patient is on cardiac monitor and/or pulse ox, they may be taken off cardiac monitor and pulse ox, left on O2 if currently on. All monitors reattached when patient returns to room. Has a \"code stroke\" or \"stroke alert\" been called? ->Yes Reason for exam:->left sided weakness Decision Support Exception - unselect if not a suspected or confirmed emergency medical condition->Emergency Medical Condition (MA) Reason for Exam: stroke protocol FINDINGS: BRAIN/VENTRICLES: There is no mass effect or midline shift. No abnormal extra-axial fluid collection. New focal cortical hyperdensity in the right parietal lobe with faint associated white matter hypoattenuation. There is no evidence of hydrocephalus. New calcification in M1 branch of right MCA. ORBITS: The visualized portion of the orbits demonstrate no acute abnormality. SINUSES: The visualized paranasal sinuses and mastoid air cells demonstrate no acute abnormality. SOFT TISSUES/SKULL:  No acute abnormality of the visualized skull or soft tissues. 1. New calcification in the M1 branch of the right MCA, likely acute embolus from cardiac or carotid origin causing occlusion or severe stenosis.  2. New focal

## 2023-09-08 NOTE — DISCHARGE SUMMARY
abnormality of the visualized skull or soft tissues. 1. New calcification in the M1 branch of the right MCA, likely acute embolus from cardiac or carotid origin causing occlusion or severe stenosis. 2. New focal cortical hyperdensity in the right parietal lobe with faint associated white matter hypoattenuation. This is most likely laminar necrosis from interval infarct but cannot exclude hemorrhagic transformation. XR CHEST PORTABLE    Result Date: 9/2/2023  EXAMINATION: ONE XRAY VIEW OF THE CHEST 9/2/2023 9:16 pm COMPARISON: 08/17/2023 HISTORY: ORDERING SYSTEM PROVIDED HISTORY: left sided weakness TECHNOLOGIST PROVIDED HISTORY: Reason for exam:->left sided weakness Reason for Exam: stroke FINDINGS: The lungs are without acute focal process. There is no effusion or pneumothorax. The cardiomediastinal silhouette is without acute process. The osseous structures are without acute process. No acute process. CTA HEAD NECK W CONTRAST    Result Date: 9/2/2023  EXAMINATION: CTA OF THE HEAD AND NECK WITH CONTRAST 9/2/2023 9:19 pm: TECHNIQUE: CTA of the head and neck was performed with the administration of intravenous contrast. Multiplanar reformatted images are provided for review. MIP images are provided for review. Stenosis of the internal carotid arteries measured using NASCET criteria. Automated exposure control, iterative reconstruction, and/or weight based adjustment of the mA/kV was utilized to reduce the radiation dose to as low as reasonably achievable. This scan was analyzed using Viz. ai contact LVO. Identification of suspected findings is not for diagnostic use beyond notification. Viz LVO is limited to analysis of imaging data and should not be used in-lieu of full patient evaluation or relied upon to make or confirm diagnosis.  COMPARISON: 08/17/2023 HISTORY: ORDERING SYSTEM PROVIDED HISTORY: left sided weakness TECHNOLOGIST PROVIDED HISTORY: Reason for exam:->left sided weakness Has a \"code stroke\" or \"stroke alert\" been called? ->Yes Decision Support Exception - unselect if not a suspected or confirmed emergency medical condition->Emergency Medical Condition (MA) Reason for Exam: left side weakness, stroke protocol FINDINGS: CTA NECK: AORTIC ARCH/ARCH VESSELS: No dissection or arterial injury. No significant stenosis of the brachiocephalic or subclavian arteries. CAROTID ARTERIES: The common carotid arteries are normal in caliber. Fibrocalcific plaque at the carotid bulbs causes 40% stenosis of the proximal right and 20% stenosis of the proximal left internal carotid arteries. Previously present pedunculated plaque or adherent thrombus in the proximal right internal carotid artery is no longer evident. The external carotids are patent. No dissection. VERTEBRAL ARTERIES: No dissection, arterial injury, or significant stenosis. SOFT TISSUES: The lung apices are clear. No cervical or superior mediastinal lymphadenopathy. The larynx and pharynx are unremarkable. No acute abnormality of the salivary and thyroid glands. There is mild heterogeneous enlargement of the thyroid isthmus and left lobe. BONES: No acute osseous abnormality. CTA HEAD: ANTERIOR CIRCULATION: The internal carotid arteries are normal in caliber. There is new calcification in the M1 segment of the right middle cerebral artery causing severe luminal stenosis. There is also unchanged severe stenosis of a posterior M3 segment of the right middle cerebral artery. The bilateral anterior and left middle cerebral arteries are normal in caliber. There is no aneurysm. POSTERIOR CIRCULATION: No significant stenosis of the vertebral, basilar, or posterior cerebral arteries. No aneurysm. OTHER: No dural venous sinus thrombosis on this non-dedicated study. BRAIN: No mass effect or midline shift. No extra-axial fluid collection. The gray-white differentiation is maintained.      1. New calcification in the M1 segment right middle cerebral

## 2023-09-08 NOTE — PROGRESS NOTES
Physical Therapy  Facility/Department: [address] [place] [address]   Physical Therapy Treatment    Name: Tanisha See  : 1950  MRN: [id number]  Date of Service: 2023    Discharge Recommendations:    Tanisha See scored a / on the AM-PAC short mobility form. Current research shows that an AM-PAC score of 17 or less is typically not associated with a discharge to the patient's home setting. Based on the patient's AM-PAC score and their current functional mobility deficits, it is recommended that the patient have 3-5 sessions per week of Physical Therapy at d/c to increase the patient's independence. Please see assessment section for further patient specific details. If patient discharges prior to next session this note will serve as a discharge summary. Please see below for the latest assessment towards goals. PT Equipment Recommendations  Equipment Needed: No (defer)      Patient Diagnosis(es): There were no encounter diagnoses. Past Medical History:  has a past medical history of Angioedema, ED (erectile dysfunction), History of colon polyps, History of Aj-Barr virus infection, History of rectal polyps, Idiopathic urticaria, MI (myocardial infarction) ([address]), Osteoarthritis, RA (rheumatoid arthritis) ([address]), Tear of medial cartilage or meniscus of knee, current, and Unspecified essential hypertension. Past Surgical History:  has a past surgical history that includes Cardiac surgery; other surgical history (2018); hip surgery; Coronary angioplasty with stent (); pr egd transoral biopsy single/multiple (N/A, 2018); Colonoscopy (N/A, 2018); Appendectomy; pr dstrj lesion anus simple surg excision (N/A, 10/9/2018); pr colonoscopy flx dx w/collj spec when pfrmd (N/A, 10/9/2018); Colonoscopy (N/A, 2019); Knee arthroscopy (Bilateral); and Colonoscopy (N/A, 3/5/2020).     Assessment   Body Structures, Functions, Activity Limitations Requiring Skilled Therapeutic Intervention: Decreased functional mobility ; Decreased balance;Decreased strength;Decreased safe awareness;Decreased posture  Assessment: Pt requiring Ax 1-2 for all mobility this date including bed mobility, sitting balance and standing to stedy. Pt continues to be limited by decreased strengh, balance, and endurance. Pt remains well below his baseline and would benefit from further skilled PT to maximize safety and independence with functional mobility. Will continue to follow. Treatment Diagnosis: impaired mobility associated with acute CVA  Activity Tolerance  Activity Tolerance: Patient limited by fatigue;Patient limited by endurance; Patient tolerated treatment well     Plan   Physcial Therapy Plan  General Plan: 5-7 times per week  Current Treatment Recommendations: Strengthening, Balance training, Functional mobility training, Transfer training, Endurance training, Safety education & training, Therapeutic activities, Patient/Caregiver education & training  Safety Devices  Type of Devices: Left in bed, Call light within reach, Nurse notified, Bed alarm in place, Gait belt     Restrictions  Position Activity Restriction  Other position/activity restrictions: up as tolerated     Subjective   General  Chart Reviewed: Yes  Additional Pertinent Hx: Patient is a 73 y/o male admitted 9/2 with LUE/LLE weakness, facial droop. Patient with recent CVA on 8/17 with mild left sided deficits (seen at [facility] and discharged home). MRI brain (+) for Multifocal patchy, punctate acute infarcts in the right MCA distribution. CT head (+) for 1. New calcification in the M1 branch of the right MCA, likely acute embolus  from cardiac or carotid origin causing occlusion or severe stenosis. 2. New focal cortical hyperdensity in the right parietal lobe with faint  associated white matter hypoattenuation. This is most likely laminar  necrosis from interval infarct but cannot exclude hemorrhagic transformation.   Family / Caregiver

## 2023-09-08 NOTE — DISCHARGE INSTR - COC
Continuity of Care Form    Patient Name: Jason Zabala   :  1950  MRN:  [id number]    [address] [place] [address] Ave date:  2023  Discharge date:  ***    Code Status Order: Full Code   Advance Directives:     Admitting Physician:  Rubén Mitchell MD  PCP: Jacquelyn Mota, FATIMAH Singh CNP    Discharging Nurse: [facility] Unit/Room#: 0774/2699-82  Discharging Unit Phone Number: ***    Emergency Contact:   Extended Emergency Contact Information  Primary Emergency Contact: [facility]  Address: [address] [place] [address], [address] [place] [address] of [address] [place] Phone: [phone]  Mobile Phone: [phone]  Relation: Spouse  Secondary Emergency Contact: Carson Thao of [address] Phone: [phone]  Mobile Phone: [phone]  Relation: Child    Past Surgical History:  Past Surgical History:   Procedure Laterality Date    APPENDECTOMY      CARDIAC SURGERY      cardiac stents x2    COLONOSCOPY N/A 2018    COLONOSCOPY WITH BIOPSY OF RECTAL MASS performed by Raheem Desouza MD at [address] N/A 2019    COLONOSCOPY POLYPECTOMY hot snare of transverse colon polyp and proximal rectal polyp  performed by Artur Salcido MD at [address] N/A 3/5/2020    COLONOSCOPY POLYPECTOMY ABLATION performed by Artur Salcido MD at [address] [facility] [address]  2017    HIP SURGERY      KNEE ARTHROSCOPY Bilateral     OTHER SURGICAL HISTORY  2018    LEFT ANTERIOR HIP ARTHROPLASTY MAKOPLASTY    SD COLONOSCOPY FLX DX W/COLLJ SPEC WHEN PFRMD N/A 10/9/2018    COLONOSCOPY WITH COLD FORCEP POLYPECTOMY performed by Brandy Aguilar MD at [address] N/A 10/9/2018    TRANSANAL EXCISION OF RECTAL POLYP, TRANSANAL MINIMALLY INVASIVE SURGERY (TAMIS); EXCISION RECTAL MASS [facility] [address] [facility] EGD TRANSORAL BIOPSY SINGLE/MULTIPLE N/A 2018    EGD the continuing treatment of the diagnosis listed and that he requires {Admit to Appropriate Level of Care:88231} for {GREATER/LESS:510831442} 30 days.      Update Admission H&P: {CHP DME Changes in OXZK}    PHYSICIAN SIGNATURE:  {Esignature:[id number]}

## 2023-09-08 NOTE — PROGRESS NOTES
Occupational Therapy  Facility/Department: [address] [place] [address]   Occupational Therapy Treatment    Name: Bruna Alves  : 1950  MRN: [id number]  Date of Service: 2023    Discharge Recommendations:  Bruna Alves scored a 7/24 on the AM-PAC ADL Inpatient form. Current research shows that an AM-PAC score of 17 or less is typically not associated with a discharge to the patient's home setting. Based on the patient's AM-PAC score and their current ADL deficits, it is recommended that the patient have 5-7 sessions per week of Occupational Therapy at d/c to increase the patient's independence. At this time, this patient demonstrates complex nursing, medical, and rehabilitative needs, and would benefit from intensive rehabilitation services upon discharge from the Inpatient setting. This patient demonstrates the ability to participate in and benefit from an intensive therapy program with a coordinated interdisciplinary team approach to foster frequent, structured, and documented communication among disciplines, who will work together to establish, prioritize, and achieve treatment goals. Please see assessment section for further patient specific details. If patient discharges prior to next session this note will serve as a discharge summary. Please see below for the latest assessment towards goals. OT Equipment Recommendations  Other: defer to next level of care       Patient Diagnosis(es): There were no encounter diagnoses. Past Medical History:  has a past medical history of Angioedema, ED (erectile dysfunction), History of colon polyps, History of Aj-Barr virus infection, History of rectal polyps, Idiopathic urticaria, MI (myocardial infarction) ([address]), Osteoarthritis, RA (rheumatoid arthritis) ([address]), Tear of medial cartilage or meniscus of knee, current, and Unspecified essential hypertension.   Past Surgical History:  has a past surgical history that includes Cardiac surgery; other surgical

## 2023-09-08 NOTE — PLAN OF CARE
hospitalization   [x] Complete education with patient/family with understanding demonstrated for: ambulation/transfers and toileting  [] Adjustment   [] Other:     Nursing Interventions will include:  [x] bowel/bladder training   [x] education for medical assistive devices   [x] medication education   [x] O2 saturation management   [x] energy conservation   [x] stress management techniques   [x] fall prevention   [x] alarms protocol   [x] seating and positioning   [] skin/wound care   [x] pressure relief instruction   [] dressing changes     [] infection protection   [x] DVT prophylaxis  [x] assistance with in room safety with transfers to bed, toilet, wheelchair, shower   [x] bathroom activities and hygiene  [] Other:    Patient/Caregiver Education for:  [x] Disease/sustained injury/management     [x] Medication Use  [] Surgical intervention  [x] Safety  [x] Body mechanics and or joint protection  [x] Health maintenance     [] Other:     PHYSICAL THERAPY:  Goals:                  Short Term Goals  Time Frame for Short Term Goals: 14 days  Short Term Goal 1: Pt will be able to perform bed mobility with max assist of 1 person  Short Term Goal 2: Pt will be able to sit EOB with proper midline positioning for 5 minutes with min A  Short Term Goal 3: Pt will be able to perform a sit pivot to the R via max A  Short Term Goal 4: Pt will be able to propel wheelchair with Mod A with ISAIAH and YOVANA 20ft            Long Term Goals  Time Frame for Long Term Goals : 21 days  Long Term Goal 1: Pt will be able to perform bed mobility with Min A assist of 1 person  Long Term Goal 2: Pt will be able to perform a sit pivot to the R via Min A  Long Term Goal 3: Pt will be able to perform a sit pivot to the R via Min A  Long Term Goal 4: Pt will be able to propel wheelchair with supervision with ISAIAH and YOVANA 100ft  These goals were reviewed with this patient at the time of assessment and Serjio Florian is in agreement.      Plan of moderate cues. Goal 4: Pt will complete L sided visual scanning/attention activities with 80% accuracy. Goal 5: Pt will participate in ongoing cognitive-linguistic assessment with additional goals to be established as appropriate. Plan of Care:  Pt to be seen 5 out of 7 days per week per ARU protocol (60 minutes with SLP)    Therapy Treatments will include:  [x]  therapeutic exercises    [x]  gait training     [x]  neuromuscular re-ed                            [x]  transfer training             [] community reintegration    [x] bed mobility                          [x]  w/c mobility and training  [x]  self care    []home mgmt    [x]  cognitive training            [x]  energy conservation        [x]  dysphagia tx    []  speech/language/communication therapy   []  group therapy    [x]  patient/family education    [] Other:    CASE MANAGEMENT:  Goals: Assist patient/family with discharge planning, patient/family counseling, and coordination with insurance during ARU stay. Yvette Cordero will be seen a minimum of 3 hours of therapy per day, a minimum of 5 out of 7 days per week (please see above for specific treatment plan per PT/OT/SLP). [] In this rare instance due to the nature of this patient's medical involvement, this patient will be seen 15 hours per week (900 minutes within a 7day period). In addition, dietician/nutritionist may monitor calorie count as well as intake and collaboratively work with SLP on dietary upgrades. Neuropsychology/Psychology may evaluate and provide necessary support.     Medical issues being managed closely and that require 24hour availability of a physician:  [] Swallowing Precautions  [x] Bowel/Bladder Fx  [] Weight bearing precautions  [] Wound Care    [x] Pain Mgmt   [x] Infection Protection  [x] DVT Prophylaxis   [x] Fall Precautions  [x] Fluid/Electrolyte/Nutrition Balance  [] Voice Protection   [] Respiratory  [] Other:    Medical Prognosis: [x]

## 2023-09-08 NOTE — PROGRESS NOTES
ARU Admission Assessment    Ethnicity  \"Are you of , /a, or Macedonian origin? \"  Check all that apply:  [x] A. No, not of , /a, or [place] Origin  [] B.  Yes, [place] American, Chicano/a  [] C.  Yes, [facility]  [] D.  Yes, [place]  [] E.  Yes, another , , or [place]onian origin  [] X. Patient unable to respond  [] Y. Patient declines to respond    Race  \"What is your race? \"  Check all that apply:  [x] A. White  [] B. Black or   [] C. American Jose or [place] Native  [] D.  Jose  [] E. [place]  [] F. [place]  [] G. [place]  [] Madhav Distad  [] I. [place]  [] J.  Other   [] K.   [] L. British or Jennifer  [] M. Macedonian  [] N. Other -[address] [place] [address]  [] X. Patient unable to respond  [] Y. Patient declines to respond  [] Z. None of the above    Language  A. \"What is your preferred language? \"   English    B. \"Do you need or want an  to communicate with a doctor or health care staff? \"  Check only one:  [x] 0. No  [] 1. Yes  [] 9. Unable to determine    Transportation  \"Has lack of transportation kept you from medical appointments, meetings, work, or from getting things needed for daily living? \"Check all that apply:  [] A.  Yes, it has kept me from medical appointments or from getting my medications  [] B.  Yes, it has kept me from non-medical meetings, appointments, work, or from getting things that I need  [x] C.  No  [] X. Patient unable to respond  [] Y. Patient declines to respond    Hearing  Ability to hear (with hearing aid or hearing appliances if normally used)  []  0. Adequate - no difficulty in normal conversation, social interaction, listening to TV  [x]  1. Minimal difficulty - difficulty in some environments (e.g. when person speaks softly or setting is noisy)  []  2. Moderate difficulty - speaker has to increase volume and speak distinctly   []  3.   Highly impaired - absence of the above    High Risk Drug Classes:  Use and Indication    Is taking: Check if the pt is taking any medications by pharmacological classification, not how it is used, in the following classes  Indication noted: If column 1 is checked, check if there is an indication noted for all meds in the drug class Is taking  (check all that apply) Indication noted (check all that apply)   Antipsychotic [] []   Anticoagulant [] []   Antibiotic [] []   Opioid [x] [x]   Antiplatelet [x] [x]   Hypoglycemic (including insulin) [] []   None of the above []     Special Treatments, Procedures, and Programs    Check all of the following treatments, procedures, and programs that apply on admission. On admission (check all that apply)   Cancer Treatments   A1. Chemotherapy []           A2. IV []           A3. Oral []           A10. Other []   B1. Radiation []   Respiratory Therapies   C1. Oxygen Therapy [x]           C2. Continuous (continuously for at least 14 hours per day) []           C3. Intermittent [x]           C4. High-concentration []   D1. Suctioning (Does not include oral suctioning) []           D2. Scheduled []           D3. As needed []   E1. Tracheostomy Care []   F1. Invasive Mechanical Ventilator (ventilator or respirator) []   G1. Non-invasive Mechanical Ventilator []           G2. BiPAP []           G3. CPAP []   Other   H1. IV Medications (Do not include sub Q pumps, flushes, Dextrose 50% or lactated ringers) []           H2. Vasoactive medications []           H3. Antibiotics []           H4. Anticoagulation []           H10. Other [x]   I1. Transfusions []   J1. Dialysis []           J2. Hemodialysis []           J3. Peritoneal dialysis []   O1. IV access (including a catheter in a vein) []           O2. Peripheral [x]           O3. Midline []           O4.  Central (PICC, tunneled, port) []      None of the above (select if no Cancer, Respiratory, or Other boxes are checked) []     The above items have been

## 2023-09-08 NOTE — PROGRESS NOTES
Neurology Progress Note    Patient: Dany Euceda MRN: [id number]    YOB: 1950  Age: 74 y.o. Sex: male   Unit: [facility] Room/Bed: 6545/4617-49 Location: [address] [place] [address]    Today's Date: 9/8/2023  Date of Admission: 9/2/2023 11:38 PM  Admitting Physician: Calixto Willett    Primary Care Physician: Brady Turpin, FATIMAH - CNP          LOS: 6 days      ASSESSMENT & RECOMMENDATIONS     Assessment  75yo man with right MCA stroke in the context of severe right MCA stenosis and mild right ICA stenosis who had sig extension of stroke since admission  Unfortunately, given that source of stroke is right MCA, which is not amenable to any neurovascular intervention, there is little that could be done to avoid this stroke extension  Furthermore, he is still at risk for further stroke as well  Only option is best medical management with dual antiplatelets and cholesterol/LDL management  BP is also a factor in his stroke but for the time being needs to continue to maintain relatively elevated BP in order to promote good perfusion   His SBP has been running in the 160s and would continue to maintain this for now    Recommendations  Continue to allow SBP to run in 150s-160s for at least then next 2 weeks and then slowly bring down  Continue dual antiplatelets with ASA 40GB and Plavix 75mg daily indefinitely (severity of intracranial atherosclerosis outweighs longterm risk of hemorrhage)  Continue LDL control as best able given his statin allergy (rash) with goal LDL < 70  A1c < 7.0  OK for transfer to acute rehab today from neuro perspective  Close f/u with Neurology within next 3 months    SUBJECTIVE     Chart reviewed, events noted, pt seen & examined. No acute events overnight. Afebrile. Left arm is flaccid. Review of Systems  No changes since pt last seen other than those noted above. PFSH  No change since the original history and note.      OBJECTIVE     Patient Vitals for

## 2023-09-08 NOTE — PROGRESS NOTES
Speech Language Pathology  Facility/Department:[facility] ICU  Dysphagia/Cognitive Treatment  Name: Dany Euceda  : 1950  MRN: [id number]                                                     Patient Diagnosis(es):   Patient Active Problem List    Diagnosis Date Noted    Acute right MCA stroke ([address]) 2023    Rectal polyp 10/09/2018    Adenomatous polyp of ascending colon     Rectal mass     Chronic congestive heart failure ([address])     ECG abnormal     Rectal bleeding 2018    Coronary artery disease due to lipid rich plaque     Status post total hip replacement, left 2018    Osteoarthritis of left hip 2018    Primary osteoarthritis of left hip 2018    Arthritis 2016    Obesity (BMI 30-39.9) 2015    Hyperglycemia 2013    Low testosterone 2011    Hyperlipidemia 2011    ED (erectile dysfunction) 2010    Idiopathic urticaria     Primary hypertension      Past Medical History:   Diagnosis Date    Angioedema     ED (erectile dysfunction) 2010    History of colon polyps     History of Aj-Barr virus infection     History of rectal polyps     Idiopathic urticaria     MI (myocardial infarction) ([address])     10/22/2017    Osteoarthritis     RA (rheumatoid arthritis) (HCC)     Tear of medial cartilage or meniscus of knee, current     Unspecified essential hypertension      Past Surgical History:   Procedure Laterality Date    APPENDECTOMY      CARDIAC SURGERY      cardiac stents x2    COLONOSCOPY N/A 2018    COLONOSCOPY WITH BIOPSY OF RECTAL MASS performed by Low Silver MD at [address] N/A 2019    COLONOSCOPY POLYPECTOMY hot snare of transverse colon polyp and proximal rectal polyp  performed by Yodit Mireles MD at [address] [place] [address] [address] 3/5/2020    COLONOSCOPY POLYPECTOMY ABLATION performed by Yodit Mireles MD at [address] [facility] [address]  2017    HIP SURGERY will tolerate recommended diet w/o overt s/s of aspiration or respiratory decline. 9/7: Unable to complete PO trials with pt this date d/t pt fatigue. Daughter reported pt with poor appetite, however has not noticed any issues with food. Daughter reported pt choking with large/successive drinks of thin liquid on occasion. Continue Goal  9/8: Pt repositioned to be seated upright in bed. Pt readily accepted trials of ice chips, thin, puree and regular. Pt demonstrated slowed, yet functional mastication of solids. Trace-min residue remaining along lingual surface, cleared with provided liquid wash. Pt appeared to swallow all PO with no overt s/s aspiration. No wet vocal quality and no globus sensation reported from pt. Based on assessment SLP recommends continue current diet of Soft and Bite Sized Solids and Thin Liquids. May need set up/feed assist intermittently. Cont. 2 - Pt/caregiver will understand and demonstrate comprehension of recommendations/aspiration precautions. 9/7: Education completed with pts daughter regarding swallow precautions including small sips. Supervision is recommended during meals with cues for single sips. Daughter also educated on pinching off straw to limit intake. RN contacted regarding pt's need for supervision. 9/8: Discussed rationale for follow up treatment diet level to re-initiate (was held 2/2 MRI results, however cleared for diet from NP this AM). Limited comprehension from pt and no family present. Cont. Speech Goals created on 9/7/23: The pt will be seen  3-5x/week to address the following goals:  Pt will be oriented to person, place and situation with min cues  9/8: Oriented to person, type of place, month, etiology for hospitalization, and year with independence. Mod cues to determine name of hospital and max cues to determine city/state. Pt expressing \"somewhere in [place]\". Cont.      Pt will recall personal information with 80% accuracy and min cues  9/8:

## 2023-09-08 NOTE — PROGRESS NOTES
NURSING ASSESSMENT: ARU ADMISSION  [facility] --[facility] ([facility])    Patient:Grady Toscano     Rehab Dx/Hx: Acute CVA (cerebrovascular accident) [facility]) [I63.9]   ZIZ:5/1/9901  AWL:[id number]  Date of Admit: 9/8/2023  Room #: 3109/3109-01    Subjective:   Patient admitted to room 3109 from room 5515 via a bed. Alert and oriented x4. Oriented to room and call light system. Oriented to rehab routine and therapy schedules. Informed about care conferences and ordering of meals. Drug / Medication Review:   Medications were reviewed by RN at time of admission  [x]  No potential or actual clinically significant medication issues were noted.      []   Yes, a clinically significant medication issue was identified                 []  Adverse Drug Event:                  []  Allergy:                  []  Side Effect:                  []  Ineffective Therapy:                  []  Drug Interaction:                 []  Duplicated Therapy:                 []  Untreated Indication:                  []  Non-adherence:                 []  Other:                  Nursing/Pharmacy contacted the physician:     Date:              Time:                  Actions recommended by physician were completed:   Date :            Time:    4 Eyes Skin Assessment   The patient is being assessed for: Admission     I agree that 2 RN's have performed a thorough Head to Toe Skin Assessment on the patient. ALL assessment sites listed below have been assessed. Areas assessed by both nurses:   [x]   Head, Face, and Ears   [x]   Shoulders, Back, and Chest, Abdomen  [x]   Arms, Elbows, and Hands   [x]   Coccyx, Sacrum, and Ischium  [x]   Legs, Feet, and Heel     Does the Patient have Skin Breakdown? Pt has scattered bruising to his BUE. He has moist reddened groin folds. The shaft of his penis is red and irritated. The Pt's sacrum/coccyx is red and blanchable. He also has reddened bony prominences of his medial metatarsal bones of his feet. Ariel Prevention initiated:  Yes   Wound Care Orders initiated:  Not Applicable      [facility] nurse consulted for Pressure Injury (Stage 3,4, Unstageable, DTI, NWPT, Complex wounds)and New or Established Ostomies:  Not Applicable    Primary Nurse eSignature: Andrew Duran RN 9/8/23 @ 2012  Co-signer eSignature:   Fabio Alejandro

## 2023-09-08 NOTE — PLAN OF CARE
Problem: Safety - Adult  Goal: Free from fall injury  Outcome: Progressing  Note: Pt educated to use his call light when he needs assistance. Pt also educated not to get up unassisted at this time. Bed alarm on when Pt in bed and chair alarm on when Pt up in chair. Non skid socks on and call light placed within reach. RN will continue to monitor Pt. Problem: Safety - Adult  Goal: Free from fall injury  Outcome: Progressing  Note: Pt educated to use his call light when he needs assistance. Pt also educated not to get up unassisted at this time. Bed alarm on when Pt in bed and chair alarm on when Pt up in chair. Non skid socks on and call light placed within reach. RN will continue to monitor Pt.

## 2023-09-08 NOTE — PROGRESS NOTES
Pt to ARU without complication. All belongings with patient and family. IV in place. Denies needs at time of transfer.

## 2023-09-08 NOTE — PLAN OF CARE
Pt denies pain at this time. Problem: Pain  Goal: Verbalizes/displays adequate comfort level or baseline comfort level  9/8/2023 0747 by Dusty Wang RN  Outcome: Progressing    Problem: Safety - Adult  Goal: Free from fall injury  9/8/2023 0750 by Dusty Wang RN  Note: Standard safety measures in place. Low bed, bed alarm armed. Pt needs reinforcement. 9/8/2023 0747 by Dusty Wang RN  Outcome: Progressing    Problem: Skin/Tissue Integrity  Goal: Absence of new skin breakdown  Description: 1. Monitor for areas of redness and/or skin breakdown  2. Assess vascular access sites hourly  3. Every 4-6 hours minimum:  Change oxygen saturation probe site  4. Every 4-6 hours:  If on nasal continuous positive airway pressure, respiratory therapy assess nares and determine need for appliance change or resting period. Outcome: Progressing  Note: Pt on specialty bed. Turned and repositioned by Rns. Preventative dressings placed to bony areas. WCTM.

## 2023-09-08 NOTE — CARE COORDINATION
Case Management Assessment            Discharge Note                    Date / Time of Note: 9/8/2023 1:28 PM                  Discharge Note Completed by: Rd Disla RN    Patient Name: Edyth Lennox   YOB: 1950  Diagnosis: Acute CVA (cerebrovascular accident) [facility]) [I63.9]   Date / Time: 9/2/2023 11:38 PM    Current PCP: Maggi Mojica, [facility] [address] [place] patient: No    Hospitalization in the last 30 days: No       Advance Directives:  Code Status: Full Code  [place] DNR form completed and on chart: No    Financial:  Payor: MEDICARE / Plan: MEDICARE PART A AND B / Product Type: *No Product type* /      Pharmacy:    [facility], [address]  [address] [address]  Phone: [phone] Fax: [phone]    [address] [place] [address]  [address] [place] [address]  Phone: [phone] Fax: [phone]      Assistance purchasing medications?: Potential Assistance Purchasing Medications: Yes  Assistance provided by Case Management: None at this time    Does patient want to participate in local refill/ meds to beds program?: No    Meds To Beds General Rules:  1. Can ONLY be done Monday- Friday between 8:30am-5pm  2. Prescription(s) must be in pharmacy by 3pm to be filled same day  3. Copy of patient's insurance/ prescription drug card and patient face sheet must be sent along with the prescription(s)  4. Cost of Rx cannot be added to hospital bill. If financial assistance is needed, please contact unit  or ;  or  CANNOT provide pharmacy voucher for patients co-pays  5.  Patients can then  the prescription on their way out of the hospital at discharge, or pharmacy can deliver to the bedside if staff is available. (payment due at time of pick-up or delivery - cash, check, or

## 2023-09-08 NOTE — PROGRESS NOTES
Pt is alert and oriented x2/3. VSS on room air with exception to elevated BP managed per MAR. Pt voiding adequately via external cath. Pt remains NPO. Fall precautions in place. Call light is within pt's reach. Plan of care is ongoing.

## 2023-09-09 ENCOUNTER — APPOINTMENT (OUTPATIENT)
Dept: CT IMAGING | Age: 73
DRG: 057 | End: 2023-09-09
Attending: STUDENT IN AN ORGANIZED HEALTH CARE EDUCATION/TRAINING PROGRAM
Payer: MEDICARE

## 2023-09-09 LAB
ANION GAP SERPL CALCULATED.3IONS-SCNC: 16 MMOL/L (ref 3–16)
BASOPHILS # BLD: 0.1 K/UL (ref 0–0.2)
BASOPHILS NFR BLD: 1.1 %
BILIRUB UR QL STRIP.AUTO: NEGATIVE
BUN SERPL-MCNC: 16 MG/DL (ref 7–20)
CALCIUM SERPL-MCNC: 9.8 MG/DL (ref 8.3–10.6)
CHLORIDE SERPL-SCNC: 102 MMOL/L (ref 99–110)
CLARITY UR: CLEAR
CO2 SERPL-SCNC: 22 MMOL/L (ref 21–32)
COLOR UR: YELLOW
CREAT SERPL-MCNC: 0.6 MG/DL (ref 0.8–1.3)
DEPRECATED RDW RBC AUTO: 13 % (ref 12.4–15.4)
EOSINOPHIL # BLD: 0.2 K/UL (ref 0–0.6)
EOSINOPHIL NFR BLD: 2.4 %
GFR SERPLBLD CREATININE-BSD FMLA CKD-EPI: >60 ML/MIN/{1.73_M2}
GLUCOSE SERPL-MCNC: 141 MG/DL (ref 70–99)
GLUCOSE UR STRIP.AUTO-MCNC: NEGATIVE MG/DL
HCT VFR BLD AUTO: 43 % (ref 40.5–52.5)
HGB BLD-MCNC: 14.5 G/DL (ref 13.5–17.5)
HGB UR QL STRIP.AUTO: NEGATIVE
KETONES UR STRIP.AUTO-MCNC: ABNORMAL MG/DL
LEUKOCYTE ESTERASE UR QL STRIP.AUTO: NEGATIVE
LYMPHOCYTES # BLD: 0.8 K/UL (ref 1–5.1)
LYMPHOCYTES NFR BLD: 10.5 %
MAGNESIUM SERPL-MCNC: 1.9 MG/DL (ref 1.8–2.4)
MCH RBC QN AUTO: 31.4 PG (ref 26–34)
MCHC RBC AUTO-ENTMCNC: 33.7 G/DL (ref 31–36)
MCV RBC AUTO: 93.1 FL (ref 80–100)
MONOCYTES # BLD: 0.8 K/UL (ref 0–1.3)
MONOCYTES NFR BLD: 10.6 %
NEUTROPHILS # BLD: 5.5 K/UL (ref 1.7–7.7)
NEUTROPHILS NFR BLD: 75.4 %
NITRITE UR QL STRIP.AUTO: NEGATIVE
PH UR STRIP.AUTO: 6 [PH] (ref 5–8)
PLATELET # BLD AUTO: 212 K/UL (ref 135–450)
PMV BLD AUTO: 8.9 FL (ref 5–10.5)
POTASSIUM SERPL-SCNC: 3.6 MMOL/L (ref 3.5–5.1)
PROT UR STRIP.AUTO-MCNC: NEGATIVE MG/DL
RBC # BLD AUTO: 4.63 M/UL (ref 4.2–5.9)
SODIUM SERPL-SCNC: 140 MMOL/L (ref 136–145)
SP GR UR STRIP.AUTO: >=1.03 (ref 1–1.03)
UA COMPLETE W REFLEX CULTURE PNL UR: ABNORMAL
UA DIPSTICK W REFLEX MICRO PNL UR: ABNORMAL
URN SPEC COLLECT METH UR: ABNORMAL
UROBILINOGEN UR STRIP-ACNC: 0.2 E.U./DL
WBC # BLD AUTO: 7.3 K/UL (ref 4–11)

## 2023-09-09 PROCEDURE — 97535 SELF CARE MNGMENT TRAINING: CPT

## 2023-09-09 PROCEDURE — 97530 THERAPEUTIC ACTIVITIES: CPT

## 2023-09-09 PROCEDURE — 97167 OT EVAL HIGH COMPLEX 60 MIN: CPT

## 2023-09-09 PROCEDURE — 1280000000 HC REHAB R&B

## 2023-09-09 PROCEDURE — 2580000003 HC RX 258: Performed by: STUDENT IN AN ORGANIZED HEALTH CARE EDUCATION/TRAINING PROGRAM

## 2023-09-09 PROCEDURE — 84443 ASSAY THYROID STIM HORMONE: CPT

## 2023-09-09 PROCEDURE — 83735 ASSAY OF MAGNESIUM: CPT

## 2023-09-09 PROCEDURE — 80048 BASIC METABOLIC PNL TOTAL CA: CPT

## 2023-09-09 PROCEDURE — 85025 COMPLETE CBC W/AUTO DIFF WBC: CPT

## 2023-09-09 PROCEDURE — 81003 URINALYSIS AUTO W/O SCOPE: CPT

## 2023-09-09 PROCEDURE — 70450 CT HEAD/BRAIN W/O DYE: CPT

## 2023-09-09 PROCEDURE — 6370000000 HC RX 637 (ALT 250 FOR IP): Performed by: INTERNAL MEDICINE

## 2023-09-09 PROCEDURE — 2580000003 HC RX 258: Performed by: INTERNAL MEDICINE

## 2023-09-09 PROCEDURE — 36415 COLL VENOUS BLD VENIPUNCTURE: CPT

## 2023-09-09 PROCEDURE — 97163 PT EVAL HIGH COMPLEX 45 MIN: CPT

## 2023-09-09 PROCEDURE — 99223 1ST HOSP IP/OBS HIGH 75: CPT | Performed by: PSYCHIATRY & NEUROLOGY

## 2023-09-09 PROCEDURE — 6370000000 HC RX 637 (ALT 250 FOR IP): Performed by: STUDENT IN AN ORGANIZED HEALTH CARE EDUCATION/TRAINING PROGRAM

## 2023-09-09 RX ORDER — SODIUM CHLORIDE 9 MG/ML
INJECTION, SOLUTION INTRAVENOUS CONTINUOUS
Status: ACTIVE | OUTPATIENT
Start: 2023-09-09 | End: 2023-09-10

## 2023-09-09 RX ORDER — LANOLIN ALCOHOL/MO/W.PET/CERES
3 CREAM (GRAM) TOPICAL NIGHTLY PRN
COMMUNITY

## 2023-09-09 RX ORDER — GAUZE BANDAGE 2" X 2"
100 BANDAGE TOPICAL DAILY
Status: DISCONTINUED | OUTPATIENT
Start: 2023-09-09 | End: 2023-09-29 | Stop reason: HOSPADM

## 2023-09-09 RX ADMIN — Medication 100 MG: at 21:36

## 2023-09-09 RX ADMIN — MONTELUKAST 10 MG: 10 TABLET, FILM COATED ORAL at 21:37

## 2023-09-09 RX ADMIN — SODIUM CHLORIDE, PRESERVATIVE FREE 10 ML: 5 INJECTION INTRAVENOUS at 09:31

## 2023-09-09 RX ADMIN — CLOPIDOGREL BISULFATE 75 MG: 75 TABLET ORAL at 09:30

## 2023-09-09 RX ADMIN — ASPIRIN 81 MG: 81 TABLET, COATED ORAL at 09:30

## 2023-09-09 RX ADMIN — OXYCODONE HYDROCHLORIDE 5 MG: 5 TABLET ORAL at 04:30

## 2023-09-09 RX ADMIN — TAMSULOSIN HYDROCHLORIDE 0.4 MG: 0.4 CAPSULE ORAL at 09:30

## 2023-09-09 RX ADMIN — SODIUM CHLORIDE: 9 INJECTION, SOLUTION INTRAVENOUS at 21:07

## 2023-09-09 RX ADMIN — CETIRIZINE HYDROCHLORIDE 10 MG: 10 TABLET, FILM COATED ORAL at 21:36

## 2023-09-09 RX ADMIN — EZETIMIBE 10 MG: 10 TABLET ORAL at 21:36

## 2023-09-09 RX ADMIN — ACETAMINOPHEN 650 MG: 325 TABLET ORAL at 21:36

## 2023-09-09 RX ADMIN — SODIUM CHLORIDE, PRESERVATIVE FREE 10 ML: 5 INJECTION INTRAVENOUS at 21:37

## 2023-09-09 RX ADMIN — HYDROCHLOROTHIAZIDE 25 MG: 25 TABLET ORAL at 09:30

## 2023-09-09 RX ADMIN — Medication 5 MG: at 21:38

## 2023-09-09 RX ADMIN — VERAPAMIL HYDROCHLORIDE 240 MG: 240 TABLET, FILM COATED, EXTENDED RELEASE ORAL at 21:40

## 2023-09-09 ASSESSMENT — PAIN DESCRIPTION - ONSET
ONSET: GRADUAL
ONSET: GRADUAL

## 2023-09-09 ASSESSMENT — PAIN DESCRIPTION - PAIN TYPE
TYPE: CHRONIC PAIN
TYPE: CHRONIC PAIN;ACUTE PAIN

## 2023-09-09 ASSESSMENT — PAIN DESCRIPTION - DIRECTION: RADIATING_TOWARDS: GENERALIZED

## 2023-09-09 ASSESSMENT — PAIN SCALES - GENERAL
PAINLEVEL_OUTOF10: 3
PAINLEVEL_OUTOF10: 1
PAINLEVEL_OUTOF10: 8

## 2023-09-09 ASSESSMENT — PAIN DESCRIPTION - LOCATION
LOCATION: BACK
LOCATION: GENERALIZED

## 2023-09-09 ASSESSMENT — PAIN - FUNCTIONAL ASSESSMENT
PAIN_FUNCTIONAL_ASSESSMENT: ACTIVITIES ARE NOT PREVENTED
PAIN_FUNCTIONAL_ASSESSMENT: ACTIVITIES ARE NOT PREVENTED

## 2023-09-09 ASSESSMENT — PAIN SCALES - WONG BAKER
WONGBAKER_NUMERICALRESPONSE: 4
WONGBAKER_NUMERICALRESPONSE: 0

## 2023-09-09 ASSESSMENT — PAIN DESCRIPTION - FREQUENCY
FREQUENCY: INTERMITTENT
FREQUENCY: INTERMITTENT

## 2023-09-09 ASSESSMENT — PAIN DESCRIPTION - ORIENTATION
ORIENTATION: LEFT;LOWER
ORIENTATION: RIGHT;LEFT;ANTERIOR

## 2023-09-09 ASSESSMENT — PAIN DESCRIPTION - DESCRIPTORS
DESCRIPTORS: ACHING;SORE
DESCRIPTORS: ACHING;DISCOMFORT

## 2023-09-09 NOTE — PROGRESS NOTES
SLP ALL NOTES  Facility/Department: [facility] ACUTE REHAB UNIT   CLINICAL BEDSIDE SWALLOW EVALUATION    NAME: Leah Gaston  : 1950  MRN: [id number]    ADMISSION DATE: 2023  ADMITTING DIAGNOSIS: has Idiopathic urticaria; Primary hypertension; ED (erectile dysfunction); Hyperlipidemia; Low testosterone; Hyperglycemia; Obesity (BMI 30-39.9); Arthritis; Primary osteoarthritis of left hip; Osteoarthritis of left hip; Status post total hip replacement, left; Rectal bleeding; Coronary artery disease due to lipid rich plaque; Chronic congestive heart failure ([address]); ECG abnormal; Rectal mass; Rectal polyp; Adenomatous polyp of ascending colon; Acute right MCA stroke ([address]); and Acute CVA (cerebrovascular accident) ([address]) on their problem list.  ONSET DATE: 9/3/23    Recent Chest Xray: 23  FINDINGS:  The lungs are without acute focal process. There is no effusion or  pneumothorax. The cardiomediastinal silhouette is without acute process. The  osseous structures are without acute process. IMPRESSION:  No acute process. Date of Eval: 2023  Evaluating Therapist: RIMA Stokes    Current Diet level:  Current Diet : Soft and Bite-Sized  Current Liquid Diet : Thin    Primary Complaint  Pt appeared unaware of deficits and there said \"No complaints\" when asked.     Pain:  Pain Assessment  Pain Assessment: None - Denies Pain  Pain Level: 8  Reece-Baker Pain Rating: No hurt  Patient's Stated Pain Goal: 5  Pain Location: Back  Pain Orientation: Left, Lower  Pain Descriptors: Aching, Sore  Functional Pain Assessment: Activities are not prevented  Pain Type: Chronic pain  Pain Radiating Towards: na  Pain Frequency: Intermittent  Pain Onset: Gradual  Non-Pharmaceutical Pain Intervention(s): None - no pain observed  Response to Pain Intervention: Other (comment) (sleeping)  Side Effects: No reported side effects  Multiple Pain Sites: No    Reason for Referral  Leah Gaston was referred for a bedside swallow

## 2023-09-09 NOTE — CONSULTS
Neurology Consultation Note      Patient: Jose Moss MRN: [id number]    YOB: 1950  Age: 74 y.o.   Sex: male   Unit: [facility] ACUTE REHAB UNIT Room/Bed: 3109/3109-01 Location: [address] [place] [address]    Date of Consultation: 9/9/2023  Date of Admission: 9/8/2023  4:56 PM ( LOS: 1 day )  Admitting Physician: Brett Spencer    Primary Care Physician: FATIMAH Trujillo - NIEVES   Consult Requested By: Brett Spencer MD     Reason for Consult: \"Right MCA CVA, new petechial hemorrhagic. 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 who transferred to acute rehab yesterday and today has been sleepy, difficult to arouse, and requiring maximal assist to get out of bed with repeat head CT that shows a very small amount of new petechial hemorrhage in the known stroke bed  Do not think that this represents new stroke  Additional stroke would have to be extensive to lead to such encephalopathy and would not expect him to improve as he has if this were the case  With that said, he is at high risk for more stroke overall given his severe intracranial stenosis but, again, his current presentation and the description of how he was today is inconsistent with new stroke anyway  The petechial hemorrhage is just that, petechial  It is clinically insignificant (see images below with yellow arrows pointing out the bits of hemorrhage)  Very unlikely that this represents a postictal state because acute/subacute stroke is very unlikely to seize and overall clinical picture does not support this  Especially in light of a somewhat fluctuating exam, overall clinical picture is most consistent with delirium  He did not sleep at all last night and he was given oxycodone at 04:30 this morning  Although he has only been on ARU for one day, he has already been hospitalized for several days and so he is at high risk for delirium    Recommendations  Would monitor for now and try to optimize his sleep for tonight, as best able  DO NOT give benzos, narcs, antihistamines as sleep aids or for behavior control  If necessary (for delirium) would consider quetiapine or olanzapine if no EKG contraindications  Continue to allow SBP to run in 150s-160s given intracranial stenosis for the next two weeks; this may mean lowering some of his current anti-HTN regimen  If exam does not improve, re-contact us and we can consider repeat MRI or EEG, otherwise will sign off for now      SUBJECTIVE     Chief Complaint:   \"I am tired of all this. \"    History of Present Illness:  Saray Mendoza is a 74 y.o. man known to me from his recent admission to [facility] for large right MCA stroke with residual left hemiplegia. He just came to rehab yesterday. At that time, per my conversation with Dr. Raul Smith, he was very appropriate and talkative. However, according to nursing, today he has been very sleepy all day and required significant assistance to get up and move. He was essentially unable to do therapy because of his inability to cooperate and his sleepiness. Because of this, a head CT was obtained which shows stable stroke but a new area of very small petechial hemorrhage (see images below). Over concern that this was significant, Neurology was reconsulted. At the time of my arrival, nursing reported that pt was a bit more awake than he had been. In my conversation with him, he is awake and conversant. Some of what he says does not immediately make sense but with further questioning he is able to elaborate somewhat and he is most focused on that he is \"tired of all of this. \"     Nursing also reports to me that he did not sleep at all last night. He was up the whole night. He received melatonin at 21:00 but also oxycodone at 04:30 this morning.      Past Medical History:   has a past medical history of Angioedema, ED (erectile dysfunction), History of

## 2023-09-09 NOTE — PLAN OF CARE
Problem: Discharge Planning  Goal: Discharge to home or other facility with appropriate resources  Outcome: Progressing     Problem: Safety - Adult  Goal: Free from fall injury  Outcome: Progressing     Problem: Skin/Tissue Integrity  Goal: Absence of new skin breakdown  Description: 1. Monitor for areas of redness and/or skin breakdown  2. Assess vascular access sites hourly  3. Every 4-6 hours minimum:  Change oxygen saturation probe site  4. Every 4-6 hours:  If on nasal continuous positive airway pressure, respiratory therapy assess nares and determine need for appliance change or resting period. Outcome: Progressing  Note: Red areas to buttock remain blanchable, no new areas of skin breakdown     Problem: ABCDS Injury Assessment  Goal: Absence of physical injury  Outcome: Progressing     Problem: Pain  Goal: Verbalizes/displays adequate comfort level or baseline comfort level  Outcome: Progressing  Note: Patient remains free from pain.

## 2023-09-09 NOTE — PROGRESS NOTES
SLP ALL NOTES  Facility/Department: [facility] ACUTE REHAB UNIT  Initial Speech/Language/Cognitive Assessment    NAME: Wilbert Rodriguez  : 1950   MRN: [id number]  ADMISSION DATE: 2023  ADMITTING DIAGNOSIS: has Idiopathic urticaria; Primary hypertension; ED (erectile dysfunction); Hyperlipidemia; Low testosterone; Hyperglycemia; Obesity (BMI 30-39.9); Arthritis; Primary osteoarthritis of left hip; Osteoarthritis of left hip; Status post total hip replacement, left; Rectal bleeding; Coronary artery disease due to lipid rich plaque; Chronic congestive heart failure ([address]); ECG abnormal; Rectal mass; Rectal polyp; Adenomatous polyp of ascending colon; Acute right MCA stroke ([address]); and Acute CVA (cerebrovascular accident) [facility]) on their problem list.  DATE ONSET: 9/3/23    Date of Eval: 2023   Evaluating Therapist: RIMA Talbot    RECENT RESULTS OF MRI: 23  IMPRESSION:     1.  Multiple new/larger areas of restricted diffusion in the right MCA territory compared to MR brain 9/3/2023. Findings likely reflect evolution/progression of right MCA infarct. 2.  No acute hemorrhage or mass effect. Primary Complaint: None indicated however pt was disoriented and appeared unaware of current deficits. Vision/ Hearing  Vision  Vision: Impaired  Vision Exceptions: Wears glasses for reading;Visual field cut (Severe left visual field cut. Unable to see past midline.)  Hearing  Hearing: Within functional limits    Assessment:  Cognitive Diagnosis: Pt presents with mod-severe cognitive-lingustic impairment. Diagnosis: Pt presents with mod-severe cognitive-lingustic impairment characterized by significantly decreased insight and increased impulsivity. Pt was disoriented to date (stating 1985) as well as to location (stated [facility]) and situation. Pt appeared confused and was observed to give different answers to the same questions asked again 10 minutes later.  Severe L sided neglect (visual field cut) see past midline.)  Hearing  Hearing: Within functional limits           Objective:     Oral Motor   Labial: Left droop  Dentition: Intact  Lingual: No impairment  Velum: No Impairment  Mandible: No impairment    Motor Speech  Apraxic Characteristics: None  Dysarthric Characteristics: Imprecise  Intelligibility: No impairment  Overall Impairment Severity: Minimal  Compensatory Strategies for Motor Speech: Over articulation in instances of communication breakdown. No communication breakdowns noted during this evaluation. Auditory Comprehension  Comprehension: Within Functional Limits  One Step Commands: WFL  Two Step Commands: WFL  Common Objects: WFL    Reading Comprehension  Reading Status: Unable to assess    Expression  Primary Mode of Expression: Verbal    Verbal Expression  Verbal Expression: Within functional limits    Written Expression  Written Expression: Unable to assess      Pragmatics/Social Functioning  Pragmatics: Exceptions to [facility]  Affect: Mild  Topic Maintenance: Mild    Cognition:      Orientation  Overall Orientation Status: Impaired  Orientation Level: Oriented to person;Disoriented to place; Disoriented to time  Attention  Attention: Exceptions to [facility]  Alternating Attention: Moderate  Sustained Attention: Moderate  Memory  Memory: Exceptions to [facility]  People Encountered: Moderate  Short-term Memory: Moderate  Problem Solving  Problem Solving: Exceptions to [facility]  Executive Function Skills: Moderate (Unable to complete clock drawing and unaware of deficits.)  Safety/Judgment  Safety/Judgment: Exceptions to [facility] (Talked about standing up to get something multiple times throughout session.  SLP immediately educated pt to call-light proceedure and explicitly told him that it was unsafe to stand up without assistance right now.)  Insight: Moderate    Impulsive: Mild    Prognosis:  Speech Therapy Prognosis  Prognosis: Good  Individuals consulted  Consulted and agree with results and recommendations:

## 2023-09-09 NOTE — PROGRESS NOTES
Occupational Therapy  Facility/Department: [facility] ACUTE REHAB UNIT  Occupational Therapy Initial Assessment    Name: Alfredo Hodgkin  : 1950  MRN: [id number]  Date of Service: 2023    Discharge Recommendations:  Continue to assess pending progress, 24 hour supervision or assist, [address]          Patient Diagnosis(es): There were no encounter diagnoses. Past Medical History:  has a past medical history of Angioedema, ED (erectile dysfunction), History of colon polyps, History of Aj-Barr virus infection, History of rectal polyps, Idiopathic urticaria, MI (myocardial infarction) ([address]), Osteoarthritis, RA (rheumatoid arthritis) ([address]), Tear of medial cartilage or meniscus of knee, current, and Unspecified essential hypertension. Past Surgical History:  has a past surgical history that includes Cardiac surgery; other surgical history (2018); hip surgery; Coronary angioplasty with stent (); pr egd transoral biopsy single/multiple (N/A, 2018); Colonoscopy (N/A, 2018); Appendectomy; pr dstrj lesion anus simple surg excision (N/A, 10/9/2018); pr colonoscopy flx dx w/collj spec when pfrmd (N/A, 10/9/2018); Colonoscopy (N/A, 2019); Knee arthroscopy (Bilateral); and Colonoscopy (N/A, 3/5/2020). Treatment Diagnosis: impaired ADLs and mobility      Assessment   Performance deficits / Impairments: Decreased functional mobility ; Decreased ADL status; Decreased endurance;Decreased ROM; Decreased strength;Decreased balance;Decreased sensation;Decreased cognition;Decreased safe awareness;Decreased coordination;Decreased high-level IADLs;Decreased fine motor control;Decreased posture;Decreased vision/visual deficit  Assessment: Pt is a 74year old male, previously independent w/ PMH significant for CVA, although able to manage at home w/ cane living w/ spouse. Pt admitted to ARU on 23 d/t new CVA.  Pt presents w/ no AROM in LUE/LLE, significant L visual and body

## 2023-09-09 NOTE — CONSULTS
Hospitalist Consultation Note    Patient's PCP: Dr. Clark Feldman, APRN - CNP     Requesting Physician: Dr. Key Nieves MD    Reason for Consultation: Assistance with medical management, mentals tatus changes, abnormal head CT. HISTORY OF PRESENT ILLNESS:    This is a very pleasant 74 y.o. male with hx of embolic CVA 11/11/94, HTN, HLD, CAD, prostate cancer s/p radiation therapy on Leupron hormonal therapy, former smoker,  who was recently admitted to the [address] [place] [address],[address] ICU as a transfer from [facility] ED 9/2/2023-9/8/2023 for what appears to be a large R. MCA stroke, we have been asked to see for mental status changes and abnormal head CT. [facility] hospital stay characterized by a large right MCA stroke in the context of severe right MCA stenosis and mild right ICA stenosis and significant extension of stroke during the admission. He was followed by Neurology and was felt that \". ..unfortunately, given that source of stroke is right MCA, which is not amenable to any neurovascular intervention, there is little that could be done to avoid this stroke extension\". Was noted that he remained at high risk for further stroke, but only option was best medical management with dual antiplatelets and cholesterol/LDL management as well as  some permissive HTN, to maintain relatively elevated BP in order to promote good perfusion   With SBP in the 160s. Appears in the ARU, was noted to be lethargic, and had a CT head done today, with finding of small petechial hemorrhages, new on account of which there was concern and Neurology and GIM ws consulted to evaluate, for need for higher level of care. Per RN, pxt has had poor sleep, especially last night. There has been no fevers, no chest pain, no dyspnea, or new focl neurologic deficits. I have reviewed chart, data and images independently and discussed case with nursing staff.        Past Medical / SBP goals as set by Neurology  - Continue Neuro-checks and repeat CT if ne deficits or concern for progression of the current petechial hemorrhages. - Dysphagia - Soft and Bite Sized  - PT/OT    Mental status changes  - Possibly sec to cerebral hypoperfusion from hypotension/soft Bps (was in the 100s earlier);  metabolic encephalopathy from undetermined etiology or delirium precipitated by poor sleep and/or narcotic (got some Oxy night prior). - Update CBC with diff, CMP  - Hold HCTZ, and place hold parameter on CCB. Will give some fluid to boost BP , as with his MCA  stenosis and ICA stenosis, cerebral perfusion dependent on adequate BP. Avoid hypotension.  - Check UA, TSH michele with recent thyroid mass  - Delirium precautions  - Monitor mental stats  - Fall and Delirium precautions    Prostate cancer; chronic  BPH with chronic urinary retention  Patient is s/p radiation therapy and currently on leuprolide hormone therapy. - Bladder scan to r/o acute retention  - Continue home Flomax    Thyroid mass; new  Incidental heterogenous enlarged thyroid found on CTA head neck. - Recommend nonemergent thyroid ultrasound outpatient for further evaluation     CAD; appears stable    Essential HTN  -  Avoid hypotension. BP goal as discussed above. Avoid hypotension. Hyperlipidemia: Patient reports an \"allergy\" to statin medications, reporting hives and rash. - Ezetemibe  - Look into coverage for PSK-9 inhib      Thank you Dr. Rajni Gómez MD for the opportunity to be involved in this patients care. If you have any questions or concerns please feel free to contact me at [phone]. Please notify on-call provider if any further concerns, further decline or new issue, so we can consider re-admiting him if this is desired or indicated.          Leyla Francisco MD

## 2023-09-09 NOTE — PROGRESS NOTES
Shift assessment complete. VSS. Decrease in mental status today, alert to only self. Difficulty eating, would not swallow. Needing max assist x3 with stedy with transfers. Dr Charlene Spain aware of altered mental status changes, ordered CT Scan and consults for neurology and internal med. Both consulted physicians believe its safe for patient to stay on rehab. Orders for CBC, UA, continuous fluids, PO thiamine, and Q4 Neuro checks and Q4 vitals. OK to give melatonin for sleep. Fall precautions in place. Denies pain or discomforts. Call light in reach. Will continue to monitor.

## 2023-09-09 NOTE — PROGRESS NOTES
Dr Toney Pavon was notified of STAT head CT results. New orders to consult internal med and neurology. Dr Toney Pavon would like patient sent to higher acuity unit.

## 2023-09-09 NOTE — PLAN OF CARE
Problem: Discharge Planning  Goal: Discharge to home or other facility with appropriate resources  Outcome: Progressing     Problem: Safety - Adult  Goal: Free from fall injury  9/8/2023 2129 by Sherlyn Negro RN  Outcome: Progressing     Problem: Skin/Tissue Integrity  Goal: Absence of new skin breakdown  Description: 1. Monitor for areas of redness and/or skin breakdown  2. Assess vascular access sites hourly  3. Every 4-6 hours minimum:  Change oxygen saturation probe site  4. Every 4-6 hours:  If on nasal continuous positive airway pressure, respiratory therapy assess nares and determine need for appliance change or resting period.   Outcome: Progressing     Problem: ABCDS Injury Assessment  Goal: Absence of physical injury  Outcome: Progressing

## 2023-09-10 LAB — TSH SERPL DL<=0.005 MIU/L-ACNC: 0.94 UIU/ML (ref 0.27–4.2)

## 2023-09-10 PROCEDURE — 6370000000 HC RX 637 (ALT 250 FOR IP): Performed by: INTERNAL MEDICINE

## 2023-09-10 PROCEDURE — 6370000000 HC RX 637 (ALT 250 FOR IP): Performed by: STUDENT IN AN ORGANIZED HEALTH CARE EDUCATION/TRAINING PROGRAM

## 2023-09-10 PROCEDURE — 2580000003 HC RX 258: Performed by: STUDENT IN AN ORGANIZED HEALTH CARE EDUCATION/TRAINING PROGRAM

## 2023-09-10 PROCEDURE — 1280000000 HC REHAB R&B

## 2023-09-10 PROCEDURE — 51798 US URINE CAPACITY MEASURE: CPT

## 2023-09-10 RX ORDER — SODIUM CHLORIDE 9 MG/ML
INJECTION, SOLUTION INTRAVENOUS
Status: COMPLETED | OUTPATIENT
Start: 2023-09-10 | End: 2023-09-11

## 2023-09-10 RX ADMIN — CETIRIZINE HYDROCHLORIDE 10 MG: 10 TABLET, FILM COATED ORAL at 20:53

## 2023-09-10 RX ADMIN — ACETAMINOPHEN 650 MG: 325 TABLET ORAL at 20:53

## 2023-09-10 RX ADMIN — MONTELUKAST 10 MG: 10 TABLET, FILM COATED ORAL at 20:53

## 2023-09-10 RX ADMIN — TAMSULOSIN HYDROCHLORIDE 0.4 MG: 0.4 CAPSULE ORAL at 09:14

## 2023-09-10 RX ADMIN — Medication 5 MG: at 20:53

## 2023-09-10 RX ADMIN — SODIUM CHLORIDE, PRESERVATIVE FREE 10 ML: 5 INJECTION INTRAVENOUS at 20:54

## 2023-09-10 RX ADMIN — Medication 100 MG: at 09:13

## 2023-09-10 RX ADMIN — SODIUM CHLORIDE, PRESERVATIVE FREE 10 ML: 5 INJECTION INTRAVENOUS at 09:18

## 2023-09-10 RX ADMIN — VERAPAMIL HYDROCHLORIDE 240 MG: 240 TABLET, FILM COATED, EXTENDED RELEASE ORAL at 20:54

## 2023-09-10 RX ADMIN — EZETIMIBE 10 MG: 10 TABLET ORAL at 20:53

## 2023-09-10 ASSESSMENT — PAIN SCALES - GENERAL
PAINLEVEL_OUTOF10: 0
PAINLEVEL_OUTOF10: 0
PAINLEVEL_OUTOF10: 1
PAINLEVEL_OUTOF10: 3

## 2023-09-10 ASSESSMENT — PAIN DESCRIPTION - PAIN TYPE: TYPE: ACUTE PAIN;CHRONIC PAIN

## 2023-09-10 ASSESSMENT — PAIN DESCRIPTION - DIRECTION: RADIATING_TOWARDS: GENERALIZED

## 2023-09-10 ASSESSMENT — PAIN - FUNCTIONAL ASSESSMENT: PAIN_FUNCTIONAL_ASSESSMENT: ACTIVITIES ARE NOT PREVENTED

## 2023-09-10 ASSESSMENT — PAIN DESCRIPTION - ONSET: ONSET: GRADUAL

## 2023-09-10 ASSESSMENT — PAIN DESCRIPTION - LOCATION: LOCATION: GENERALIZED

## 2023-09-10 ASSESSMENT — PAIN SCALES - WONG BAKER
WONGBAKER_NUMERICALRESPONSE: 4
WONGBAKER_NUMERICALRESPONSE: 0

## 2023-09-10 ASSESSMENT — PAIN DESCRIPTION - FREQUENCY: FREQUENCY: INTERMITTENT

## 2023-09-10 ASSESSMENT — PAIN DESCRIPTION - ORIENTATION: ORIENTATION: RIGHT;LEFT;ANTERIOR

## 2023-09-10 ASSESSMENT — PAIN DESCRIPTION - DESCRIPTORS: DESCRIPTORS: ACHING;DISCOMFORT

## 2023-09-10 NOTE — PROGRESS NOTES
Hospitalist Consultation Note    Patient's PCP: Dr. Funmi Newberry, APRN - CNP     Requesting Physician: Dr. Nancy Osorio MD    Reason for Consultation: Assistance with medical management, mentals tatus changes, abnormal head CT. HISTORY OF PRESENT ILLNESS:    This is a very pleasant 74 y.o. male with hx of embolic CVA 29/90/21, HTN, HLD, CAD, prostate cancer s/p radiation therapy on Leupron hormonal therapy, former smoker,  who was recently admitted to the [facility] ICU as a transfer from [facility] ED 9/2/2023-9/8/2023 for what appears to be a large R. MCA stroke, we have been asked to see for mental status changes and abnormal head CT. [facility] characterized by a large right MCA stroke in the context of severe right MCA stenosis and mild right ICA stenosis and significant extension of stroke during the admission. He was followed by Neurology and was felt that \". ..unfortunately, given that source of stroke is right MCA, which is not amenable to any neurovascular intervention, there is little that could be done to avoid this stroke extension\". Was noted that he remained at high risk for further stroke, but only option was best medical management with dual antiplatelets and cholesterol/LDL management as well as  some permissive HTN, to maintain relatively elevated BP in order to promote good perfusion   With SBP in the 160s. Appears in the ARU, was noted to be lethargic, and had a CT head done today, with finding of small petechial hemorrhages, new on account of which there was concern and Neurology and GIM ws consulted to evaluate, for need for higher level of care. Per RN, pxt has had poor sleep, especially last night. There has been no fevers, no chest pain, no dyspnea, or new focl neurologic deficits. I have reviewed chart, data and images independently and discussed case with nursing staff.          Interval BLOODU Negative   GLUCOSEU Negative        Assessment & Plan: This is a very pleasant 74 y.o. male with coronary artery disease, hyperlipidemia who presents with Acute right MCA stroke       Large Acute right MCA ischemic stroke, sec to severe right MCA stenosis and mild right ICA stenosis   - Admitted at [facility] 9/2/2023-9/8/2023 for this stroke, with noted extension while hospitalized, and limitation in what can be done to immediately mitigate or prevent prevent further extension.  - Medical management was elected: placed on DAPT. At high risk for hemorrhagic transformation with the size of the stroke, but was felt benefit of DAPT outweighed risk.  - Transferred to ARU 9/8/23  - Seen by Neurology for concern for mental status changes, lethargy with CT in ARU 9/9/23: small petechial hemorrhages  - Neurology consulted: reviewed input.   - Continue to optimize secondary risk factor mgt, risk factor control  - APT as elected by Neurology; antilipid therapy. On Ezetemibe. - Consider for PSK-9 inhibitors as reportedly allergic to statins, with his dual indications of stroke and CAD.   - SBP goals as set by Neurology  - Continue Neuro-checks and repeat CT if new deficits or concern for progression of the current petechial hemorrhages and notify neurology for consideration of further neuroimaging and/or EEG.   - Dysphagia - Soft and Bite Sized  - PT/OT    Mental status changes  - Possibly sec to cerebral hypoperfusion from hypotension/soft Bps (was in the 100s earlier);  metabolic encephalopathy from undetermined etiology or delirium precipitated by poor sleep and/or narcotic (got some Oxy night prior). - Update CBC with diff, CMP: reviewed  - Held HCTZ, and placed hold parameter on CCB and gave some fluid to boost BP, as with his MCA  stenosis and ICA stenosis, cerebral perfusion dependent on adequate BP.  Avoid hypotension.  - UA: no features of infection  - TSH ordered michele with recent thyroid mass: in process  -

## 2023-09-10 NOTE — PLAN OF CARE
Problem: Discharge Planning  Goal: Discharge to home or other facility with appropriate resources  9/10/2023 1555 by Deidra Villareal RN  Outcome: Progressing  Flowsheets (Taken 9/10/2023 0904 by Ulysses Salm, RN)  Discharge to home or other facility with appropriate resources: Identify barriers to discharge with patient and caregiver     Problem: Safety - Adult  Goal: Free from fall injury  9/10/2023 1555 by Deidra Villareal RN  Outcome: Progressing  Note: Patient is able to transfer with max assist x3 using the stedy and has remained free from fall. Problem: Skin/Tissue Integrity  Goal: Absence of new skin breakdown  Description: 1. Monitor for areas of redness and/or skin breakdown  2. Assess vascular access sites hourly  3. Every 4-6 hours minimum:  Change oxygen saturation probe site  4. Every 4-6 hours:  If on nasal continuous positive airway pressure, respiratory therapy assess nares and determine need for appliance change or resting period. 9/10/2023 1555 by Deidra Villareal RN  Outcome: Progressing     Problem: ABCDS Injury Assessment  Goal: Absence of physical injury  9/10/2023 1555 by Deidra Villareal RN  Outcome: Progressing     Problem: Pain  Goal: Verbalizes/displays adequate comfort level or baseline comfort level  Outcome: Progressing  Note: Patient has remained free from pain. RN able to keep him comfortable with frequent repositioning.      Problem: Chronic Conditions and Co-morbidities  Goal: Patient's chronic conditions and co-morbidity symptoms are monitored and maintained or improved  Outcome: Progressing  Flowsheets (Taken 9/10/2023 0904 by Ulysses Salm, RN)  Care Plan - Patient's Chronic Conditions and Co-Morbidity Symptoms are Monitored and Maintained or Improved: Monitor and assess patient's chronic conditions and comorbid symptoms for stability, deterioration, or improvement

## 2023-09-10 NOTE — PLAN OF CARE
Problem: Discharge Planning  Goal: Discharge to home or other facility with appropriate resources  Outcome: Progressing     Problem: Safety - Adult  Goal: Free from fall injury  Outcome: Progressing     Problem: Skin/Tissue Integrity  Goal: Absence of new skin breakdown  Description: 1. Monitor for areas of redness and/or skin breakdown  2. Assess vascular access sites hourly  3. Every 4-6 hours minimum:  Change oxygen saturation probe site  4. Every 4-6 hours:  If on nasal continuous positive airway pressure, respiratory therapy assess nares and determine need for appliance change or resting period. Outcome: Progressing  Note: Red areas to buttock remain blanchable, no new areas of skin breakdown     Problem: ABCDS Injury Assessment  Goal: Absence of physical injury  Outcome: Progressing     Problem: Pain  Goal: Verbalizes/displays adequate comfort level or baseline comfort level  Verbalizes/displays adequate comfort level or baseline comfort level: Encourage patient to monitor pain and request assistance  Outcome: Progressing  Note: Patient remains free from pain.

## 2023-09-10 NOTE — PROGRESS NOTES
Shift assessment completed. VSS. Patient alert to self, disoriented to place, time, situation. Patient is lethargic but arousable so far this shift, patient appears restless at times by moving RUE and RLE. Patient was straight cath'd for UA sample with no difficulty, patient tolerated well. Patient is also incontinent of bowel and bladder. Medications was given whole with pudding, no difficulties noted from the patient. Safety measures in place, call light and overbed table within reach. Hourly rounding and visual checks in place, will continue monitor.

## 2023-09-10 NOTE — PROGRESS NOTES
Shift assessment complete every 4 hours. Patient is more alert today but is disoriented at times. He is able to eat with assistance today and is drinking nutritional supplements. Internal med provider placed order for PRN dose of 1000 ml NS with BP<140. Fall precautions in place. Denies pain or discomforts. Call light in reach. Will continue to monitor.

## 2023-09-11 LAB
ANION GAP SERPL CALCULATED.3IONS-SCNC: 11 MMOL/L (ref 3–16)
BUN SERPL-MCNC: 27 MG/DL (ref 7–20)
CALCIUM SERPL-MCNC: 10.1 MG/DL (ref 8.3–10.6)
CHLORIDE SERPL-SCNC: 103 MMOL/L (ref 99–110)
CO2 SERPL-SCNC: 24 MMOL/L (ref 21–32)
CREAT SERPL-MCNC: 0.7 MG/DL (ref 0.8–1.3)
DEPRECATED RDW RBC AUTO: 12.6 % (ref 12.4–15.4)
GFR SERPLBLD CREATININE-BSD FMLA CKD-EPI: >60 ML/MIN/{1.73_M2}
GLUCOSE SERPL-MCNC: 122 MG/DL (ref 70–99)
HCT VFR BLD AUTO: 41.6 % (ref 40.5–52.5)
HGB BLD-MCNC: 14.3 G/DL (ref 13.5–17.5)
MCH RBC QN AUTO: 31.7 PG (ref 26–34)
MCHC RBC AUTO-ENTMCNC: 34.3 G/DL (ref 31–36)
MCV RBC AUTO: 92.6 FL (ref 80–100)
PLATELET # BLD AUTO: 215 K/UL (ref 135–450)
PMV BLD AUTO: 9 FL (ref 5–10.5)
POTASSIUM SERPL-SCNC: 3.6 MMOL/L (ref 3.5–5.1)
RBC # BLD AUTO: 4.5 M/UL (ref 4.2–5.9)
SODIUM SERPL-SCNC: 138 MMOL/L (ref 136–145)
WBC # BLD AUTO: 7.1 K/UL (ref 4–11)

## 2023-09-11 PROCEDURE — 6370000000 HC RX 637 (ALT 250 FOR IP): Performed by: STUDENT IN AN ORGANIZED HEALTH CARE EDUCATION/TRAINING PROGRAM

## 2023-09-11 PROCEDURE — 97530 THERAPEUTIC ACTIVITIES: CPT | Performed by: PHYSICAL THERAPIST

## 2023-09-11 PROCEDURE — 2580000003 HC RX 258: Performed by: STUDENT IN AN ORGANIZED HEALTH CARE EDUCATION/TRAINING PROGRAM

## 2023-09-11 PROCEDURE — 97535 SELF CARE MNGMENT TRAINING: CPT

## 2023-09-11 PROCEDURE — 6370000000 HC RX 637 (ALT 250 FOR IP): Performed by: PHYSICAL MEDICINE & REHABILITATION

## 2023-09-11 PROCEDURE — 36415 COLL VENOUS BLD VENIPUNCTURE: CPT

## 2023-09-11 PROCEDURE — 2580000003 HC RX 258: Performed by: INTERNAL MEDICINE

## 2023-09-11 PROCEDURE — 6370000000 HC RX 637 (ALT 250 FOR IP): Performed by: INTERNAL MEDICINE

## 2023-09-11 PROCEDURE — 80048 BASIC METABOLIC PNL TOTAL CA: CPT

## 2023-09-11 PROCEDURE — 85027 COMPLETE CBC AUTOMATED: CPT

## 2023-09-11 PROCEDURE — 97129 THER IVNTJ 1ST 15 MIN: CPT

## 2023-09-11 PROCEDURE — 97530 THERAPEUTIC ACTIVITIES: CPT

## 2023-09-11 PROCEDURE — 99497 ADVNCD CARE PLAN 30 MIN: CPT | Performed by: NURSE PRACTITIONER

## 2023-09-11 PROCEDURE — 97542 WHEELCHAIR MNGMENT TRAINING: CPT | Performed by: PHYSICAL THERAPIST

## 2023-09-11 PROCEDURE — 97130 THER IVNTJ EA ADDL 15 MIN: CPT

## 2023-09-11 PROCEDURE — 1280000000 HC REHAB R&B

## 2023-09-11 RX ADMIN — TAMSULOSIN HYDROCHLORIDE 0.4 MG: 0.4 CAPSULE ORAL at 09:04

## 2023-09-11 RX ADMIN — MONTELUKAST 10 MG: 10 TABLET, FILM COATED ORAL at 19:51

## 2023-09-11 RX ADMIN — VERAPAMIL HYDROCHLORIDE 180 MG: 240 TABLET, FILM COATED, EXTENDED RELEASE ORAL at 20:00

## 2023-09-11 RX ADMIN — Medication 5 MG: at 19:51

## 2023-09-11 RX ADMIN — SODIUM CHLORIDE, PRESERVATIVE FREE 10 ML: 5 INJECTION INTRAVENOUS at 09:04

## 2023-09-11 RX ADMIN — EZETIMIBE 10 MG: 10 TABLET ORAL at 19:51

## 2023-09-11 RX ADMIN — SODIUM CHLORIDE: 9 INJECTION, SOLUTION INTRAVENOUS at 10:28

## 2023-09-11 RX ADMIN — Medication 100 MG: at 09:04

## 2023-09-11 RX ADMIN — NYSTATIN 500000 UNITS: 100000 SUSPENSION ORAL at 19:51

## 2023-09-11 RX ADMIN — CETIRIZINE HYDROCHLORIDE 10 MG: 10 TABLET, FILM COATED ORAL at 19:51

## 2023-09-11 ASSESSMENT — PAIN SCALES - GENERAL
PAINLEVEL_OUTOF10: 0
PAINLEVEL_OUTOF10: 0

## 2023-09-11 NOTE — ACP (ADVANCE CARE PLANNING)
Advance Care Planning     Advance Care Planning (ACP) Physician/NP/PA (Provider) Conversation      Date of ACP Conversation: 9/8/2023    Conversation Conducted with:    Healthcare Decision Maker: Next of Kin by law (only applies in absence of above) (name) wife     Health Care Decision Maker:    Current Designated Health Care Decision Maker:    Primary Decision Maker: Eliseo Angulo - [phone]    Pt is alert and oriented to himself, does not know the place, thinks it's October. He does not seem to have good insight into his medical problems. I called his wife Grant Moss. Introduced palliative care and had a voluntary discussion about advance care planning. She feels he has declined in the past couple weeks rather than recovering from his stroke. We discussed code status and she says he would not want efforts at resuscitation at this point. Discussed intubation and she said we should transition to comfort care if he gets to the point of needing to be intubated. Changed code status to DNR/DNI (Limited- no to all interventions) per wife's wishes. Care Preferences:    Ventilation: \"If you were in your present state of health and suddenly became very ill and were unable to breathe on your own, what would your preference be about the use of a ventilator (breathing machine) if it was available to you? \"    If patient would desire the use of a ventilator (breathing machine), answer \"yes\", if not answer \"no\":no    \"If your health worsens and it becomes clear that your chance of recovery is unlikely, what would your preference be about the use of a ventilator (breathing machine) if it was available to you? \"   no    Resuscitation:  \"CPR works best to restart the heart when there is a sudden event, like a heart attack, in someone who is otherwise healthy. Unfortunately, CPR does not typically restart the heart for people who have serious health conditions or who are very sick. \"    \"In the event your heart stopped as a

## 2023-09-11 NOTE — PROGRESS NOTES
Hospitalist Consultation Note    Patient's PCP: Dr. Funmi Newberry, APRN - CNP     Requesting Physician: Dr. Nancy Osorio MD    Reason for Consultation: Assistance with medical management, mentals tatus changes, abnormal head CT. HISTORY OF PRESENT ILLNESS:    This is a very pleasant 74 y.o. 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 ICU as a transfer from [facility] ED 9/2/2023-9/8/2023 for what appears to be a large R. MCA stroke, we have been asked to see for mental status changes and abnormal head CT. [facility] characterized by a large right MCA stroke in the context of severe right MCA stenosis and mild right ICA stenosis and significant extension of stroke during the admission. He was followed by Neurology and was felt that \". ..unfortunately, given that source of stroke is right MCA, which is not amenable to any neurovascular intervention, there is little that could be done to avoid this stroke extension\". Was noted that he remained at high risk for further stroke, but only option was best medical management with dual antiplatelets and cholesterol/LDL management as well as  some permissive HTN, to maintain relatively elevated BP in order to promote good perfusion   With SBP in the 160s. Appears in the ARU, was noted to be lethargic, and had a CT head done today, with finding of small petechial hemorrhages, new on account of which there was concern and Neurology and GIM ws consulted to evaluate, for need for higher level of care. Per RN, pxt has had poor sleep, especially last night. There has been no fevers, no chest pain, no dyspnea, or new focl neurologic deficits. I have reviewed chart, data and images independently and discussed case with nursing staff.          Interval Negative   BLOODU Negative   GLUCOSEU Negative          Assessment & Plan: This is a very pleasant 74 y.o. male with coronary artery disease, hyperlipidemia who presents with Acute right MCA stroke       Large Acute right MCA ischemic stroke, sec to severe right MCA stenosis and mild right ICA stenosis   - Admitted at [facility] 9/2/2023-9/8/2023 for this stroke, with noted extension while hospitalized, and limitation in what can be done to immediately mitigate or prevent prevent further extension.  - Medical management was elected: placed on DAPT. At high risk for hemorrhagic transformation with the size of the stroke, but was felt benefit of DAPT outweighed risk.  - Transferred to ARU 9/8/23  - Seen by Neurology for concern for mental status changes, lethargy with CT in ARU 9/9/23: small petechial hemorrhages  - Neurology consulted: reviewed input.   - Continue to optimize secondary risk factor mgt, risk factor control  - APT as elected by Neurology; antilipid therapy. On Ezetemibe. - Consider for PSK-9 inhibitors as reportedly allergic to statins, with his dual indications of stroke and CAD.   - SBP goals as set by Neurology  - Continue Neuro-checks and repeat CT if new deficits or concern for progression of the current petechial hemorrhages and notify neurology for consideration of further neuroimaging and/or EEG.   - Dysphagia - Soft and Bite Sized  - PT/OT    Mental status changes  - Possibly sec to cerebral hypoperfusion from hypotension/soft Bps (was in the 100s earlier);  metabolic encephalopathy from undetermined etiology or delirium precipitated by poor sleep and/or narcotic (got some Oxy night prior). - Update CBC with diff, CMP: reviewed  - Held HCTZ, and placed hold parameter on CCB and gave some fluid to boost BP, as with his MCA  stenosis and ICA stenosis, cerebral perfusion dependent on adequate BP.  Avoid hypotension.  - UA: no features of infection  - TSH ordered michele with recent thyroid mass: in

## 2023-09-11 NOTE — PROGRESS NOTES
ACUTE REHAB UNIT  SPEECH/LANGUAGE PATHOLOGY      [x] Daily  [] Weekly Care Conference Note  [] Discharge    Patient:Grady Armijo      QZI:[birth year]  ZYJ:[id number]  Rehab Dx/Hx: Acute CVA (cerebrovascular accident) ([address]) [I63.9]    Precautions: [] Aspiration  [x] Fall risk  [] Sternal  [] Seizure [] Hip  [] Weight Bearing [x] Other: delirium     ST Dx: [] Aphasia  [] Dysarthria  [] Apraxia   [x] Oropharyngeal dysphagia [x] Cognitive Impairment  [] Other:   Date of Admit: 9/8/2023  Room #: 3109/3109-01  Date: 9/11/2023          Current Diet Order:ADULT DIET; Dysphagia - Soft and Bite Sized  ADULT ORAL NUTRITION SUPPLEMENT; Breakfast, Lunch, Dinner; Standard High Calorie/High Protein Oral Supplement   Recommended Form of Meds: PO  Compensatory Swallowing Strategies : Alternate solids and liquids, Remain upright for 30-45 minutes after meals, Upright as possible for all oral intake, Small bites/sips, Lingual sweep   Lives With: Spouse        Occupation: Retired  Type of Occupation:  for [employer]. Dentition: Adequate  Vision  Vision: Impaired  Vision Exceptions: Wears glasses for reading, Visual field cut  Hearing  Hearing: Within functional limits  Barriers toward progress: Limited insight into deficits        Date: 9/11/2023      Tx session 1 Tx session 2   Total Timed Code Min 30 32   Total Treatment Minutes 30 32   Individual Treatment Minutes 30 32   Group Treatment Minutes 0 0   Co-Treat Minutes 0 0   Brief Exception: N/A N/A   Pain None indicated None indicated    Pain Intervention: [] RN notified  [] Repositioned  [] Intervention offered and patient declined  [x] N/A  [] Other: [] RN notified  [] Repositioned  [] Intervention offered and patient declined  [x] N/A  [] Other:   Subjective:     Pt upright in chair upon entry with RN at side.  Pt requesting night time medications from RN; re-orientation completed to AM. Meal tray in front of patient with no evidence that any of the food items attending to R visual field. When asked who pt was talking to, pt stated \"the wall\". MAX verbal/tactile cues to attend to speaker on the L and identifying objects. X1 instance of carry over and pt independently scanning to make eye contact with SLP, however no further instances across all other attempts. Trialed reading sentences across a page. Only able to ID/state last word and required max cues to scan with finger to locate green line on far L. Pt will participate in ongoing cognitive-linguistic assessment with additional goals to be established as appropriate. Pt was administered portions of the Cognitive Linguistic Quick Test (CLQT) with results in each subtest listed below. Items are out of the max score achievable in each task. Symbol Cancellation:  (score indicates pt is below the criterion cut score of 10)  Confrontation Namin/10 (score indicates pt is below the criterion cut score of 10)  Clock Drawin/13 (score indicates pt is below the criterion cut score of 11)  Story Retellin/10 (score indicates pt is at the criterion cut score of 5)   Not targeted this session. Other areas targeted:     Education:   Educated to rationale for tx session and skills targeted. Educated to goal target areas. Educated re: tasks targeted, strategies to attend to the L. Wife present at end of session and educated. Safety Devices: [x] Call light within reach  [x] Chair alarm activated and connected to nurse call light system  [] Bed alarm activated   [x] Other: educated pt to call light policy and pt unable to teach back. Pt was unable to then identify the correct call light button when presenting call light to pt in R visual field. SLP showed pt correct button and pt was then able to point to correct button. Placed call light into R visual field.   [x] Call light within reach  [] Chair alarm activated and connected to nurse call light system  [x] Bed alarm activated  [x] Other: wife

## 2023-09-11 NOTE — CARE COORDINATION
Case Management Assessment  Initial Evaluation    Date/Time of Evaluation: 9/11/2023 2:11 PM  Assessment Completed by: MILADY Saenz    If patient is discharged prior to next notation, then this note serves as note for discharge by case management. Patient Name: Annia Field                   YOB: 1950  Diagnosis: Acute CVA (cerebrovascular accident) [facility]) [I63.9]                   Date / Time: 9/8/2023  4:56 PM    Patient Admission Status: REHAB IP   Readmission Risk (Low < 19, Mod (19-27), High > 27): Readmission Risk Score: 14.8    Current PCP: FATIMAH Burks CNP  PCP verified by CM? No    Chart Reviewed: Yes      History Provided by: Patient  Patient Orientation: Alert and Oriented    Patient Cognition: Alert    Hospitalization in the last 30 days (Readmission):  No    If yes, Readmission Assessment in CM Navigator will be completed. Advance Directives:      Code Status: Limited   Patient's Primary Decision Maker is: Legal Next of Kin    [facility] Spouse - [phone]    Discharge Planning:    Patient lives with: Spouse/Significant Other Type of Home: Apartment  Primary Care Giver: Spouse  Patient Support Systems include: Spouse/Significant Other   Current Financial resources: None  Current community resources: None  Current services prior to admission: None            Current DME:              Type of Home Care services:  None    ADLS  Prior functional level: Assistance with the following:, Mobility  Current functional level: Assistance with the following:, Mobility    PT AM-PAC:   /24  OT AM-PAC:   /24    Family can provide assistance at DC: Yes  Would you like Case Management to discuss the discharge plan with any other family members/significant others, and if so, who?  Yes  Plans to Return to Present Housing: Unknown at present  Other Identified Issues/Barriers to RETURNING to current housing: Medical   Potential Assistance needed at

## 2023-09-11 NOTE — PROGRESS NOTES
Shift assessment completed. VSS. Patient alert to self, disoriented to place, time, situation. Patient is lethargic but arousable so far this shift, patient appears restless at times by moving RUE and RLE. Patient is incontinent of bowel and bladder. Medications was given whole with pudding, no difficulties noted from the patient. Safety measures in place, call light and overbed table within reach.  Hourly rounding and visual checks in place, will continue monitor

## 2023-09-11 NOTE — PROGRESS NOTES
Pertinent Hx: Patient is a 75 y/o male admitted 9/2 with LUE/LLE weakness, facial droop. Patient with recent CVA on 8/17 with mild left sided deficits (seen at [facility] and discharged home). MRI brain (+) for Multifocal patchy, punctate acute infarcts in the right MCA distribution. CT head (+) for 1. New calcification in the M1 branch of the right MCA, likely acute embolus  from cardiac or carotid origin causing occlusion or severe stenosis. 2. New focal cortical hyperdensity in the right parietal lobe with faint  associated white matter hypoattenuation. This is most likely laminar  necrosis from interval infarct but cannot exclude hemorrhagic transformation. Family / Caregiver Present: No  Referring Practitioner: Diane Quigley MD  Referral Date : 09/09/23  Diagnosis: Acute CVA    Restrictions:  Restrictions/Precautions: Up as Tolerated; Fall Risk;Modified Diet  Position Activity Restriction  Other position/activity restrictions: up as assistance     SUBJECTIVE  Subjective: Pt seated in recliner, agreeable to therapy initally then started falling asleep and needing constant stimulus to stay awake. Post Treatment Pain Screening       Prior Level of Function:  Social/Functional History  Lives With: Spouse  Type of Home: House  Home Layout: Two level, Able to Live on Main level with bedroom/bathroom  Home Access: Stairs to enter with rails  Entrance Stairs - Number of Steps: 7  Entrance Stairs - Rails: Both  Bathroom Shower/Tub: Walk-in shower  Bathroom Toilet: Standard  Bathroom Equipment: Shower chair, Grab bars in shower  Home Equipment: Antony Candle, rolling  ADL Assistance: Independent  Homemaking Assistance: Needs assistance  Ambulation Assistance: Independent  Transfer Assistance: Independent  Active : No  Occupation: Retired  Type of Occupation:  for [employer].   Additional Comments: Information retrieved per pre-screen d/t pt's cognitive deficits and difficulty difficulty with staying awake during the session. Pt would benefit from continued therapy in order to improve his bed mobility, transfers, wheelchair mobility, safety and independence. Treatment Diagnosis: Impaired functional mobility 2/2 to acute CVA  Therapy Prognosis: Fair  Decision Making: High Complexity  Discharge Recommendations: Continue to assess pending progress;Subacute/Skilled Nursing Facility; [facility] [address] [place] [address] with PT  PT D/C Equipment  Other: May benefit from a custom wheelchair  PT Equipment Recommendations  Other: May benefit from a custom wheelchair    CLINICAL IMPRESSION   Pt is a 73 yo male from home with wife and reports prior independence with some assistance due to previous strokes. He is completely dependent of 2-3 people for all bed mobility, transfers via stedy and sitting balance. Pt has significant pushers syndrome and a heavy left lean that was unable to self correct with a mirror. Pt has no insight into his deficits and difficulty with staying awake during the session. Pt would benefit from continued therapy in order to improve his bed mobility, transfers, wheelchair mobility, safety and independence.     GOALS  Patient Goals   Patient Goals : \"get the hell out of here and out of therapy\"  Short Term Goals  Time Frame for Short Term Goals: 14 days  Short Term Goal 1: Pt will be able to perform bed mobility with max assist of 1 person  Short Term Goal 2: Pt will be able to sit EOB with proper midline positioning for 5 minutes with min A  Short Term Goal 3: Pt will be able to perform a sit pivot to the R via max A  Short Term Goal 4: Pt will be able to propel wheelchair with Mod A with RLE and RUE 20ft  Long Term Goals  Time Frame for Long Term Goals : 21 days  Long Term Goal 1: Pt will be able to perform bed mobility with Min A assist of 1 person  Long Term Goal 2: Pt will be able to perform a sit pivot to the R via Min A  Long Term Goal 3: Pt will be able to perform a sit pivot to the R via

## 2023-09-11 NOTE — PROGRESS NOTES
Occupational Therapy  Facility/Department: [facility] ACUTE REHAB UNIT  Rehabilitation Occupational Therapy Daily Treatment Note    Date: 23  Patient Name: Gladys Ridley       Room: 3109/3109-01  MRN: [id number]  Account: [de-identified]   : 1950  (74 y.o.) Gender: male                    Past Medical History:  has a past medical history of Angioedema, ED (erectile dysfunction), History of colon polyps, History of Aj-Barr virus infection, History of rectal polyps, Idiopathic urticaria, MI (myocardial infarction) ([address]), Osteoarthritis, RA (rheumatoid arthritis) ([address]), Tear of medial cartilage or meniscus of knee, current, and Unspecified essential hypertension. Past Surgical History:   has a past surgical history that includes Cardiac surgery; other surgical history (2018); hip surgery; Coronary angioplasty with stent (); pr egd transoral biopsy single/multiple (N/A, 2018); Colonoscopy (N/A, 2018); Appendectomy; pr dstrj lesion anus simple surg excision (N/A, 10/9/2018); pr colonoscopy flx dx w/collj spec when pfrmd (N/A, 10/9/2018); Colonoscopy (N/A, 2019); Knee arthroscopy (Bilateral); and Colonoscopy (N/A, 3/5/2020). Restrictions  Restrictions/Precautions: Up as Tolerated, Fall Risk, Modified Diet  Other position/activity restrictions: up as assistance    Subjective  Subjective: Pt semi supine upon OT entry. Co-tx indicated in order to maximize therapeutic potential. Pt reports pain during movement, specifically w/ LUE/LLE, but did not provide number rating. Addressed via appropriate positioning to protect muscles. Restrictions/Precautions: Up as Tolerated; Fall Risk;Modified Diet             Objective     Cognition  Arousal/Alertness: Inconsistent responses to stimuli;Delayed responses to stimuli  Following Commands: Inconsistently follows commands; Follows one step commands with repetition  Attention Span: Difficulty dividing attention; Difficulty attending to directions  Memory: Decreased recall of recent events  Safety Judgement: Decreased awareness of need for safety  Problem Solving: Decreased awareness of errors;Assistance required to identify errors made;Assistance required to generate solutions;Assistance required to implement solutions;Assistance required to correct errors made  Insights: Not aware of deficits  Initiation: Requires cues for all  Sequencing: Requires cues for all  Cognition Comment: Pt continues to demo decreased awareness of L side of body and overall poor insight into deficits. Pt w/ improved alertness this session in comparison to on evaluation 9/9. ADL  Grooming/Oral Hygiene  Assistance Level: Moderate assistance  Skilled Clinical Factors: Pt completed oral care seated in w/c at sink. Mod A for upright posture/sitting balance during task. Assist to set up toothbrush and place in [address] Pt completed oral care w/ cues. Assist for anterior lean to rinse in sink. Assist to comb hair. Lower Extremity Dressing  Assistance Level: Maximum assistance  Skilled Clinical Factors: Assist to thread BLE into pants supine in bed, pt able to pull up on R side past knee. Pt rolled to R side w/ assist to pull up over L buttocks. Pt completed half bridge and assisted in pulling up over R side w/ assist to fully pull up and fasten. Putting On/Taking Off Footwear  Assistance Level: Dependent  Skilled Clinical Factors: Assist to don R shoe seated in w/c. Functional Mobility  Device: Wheelchair  Assistance Level: Maximum assistance  Skilled Clinical Factors: Pt self propelled in w/c using RUE/RLE w/ max A to attend to L side to avoid obstacles. Tactile cues required for hand placement/foot placement during propulsion. Bridging  Assistance Level: Maximum assistance  Skilled Clinical Factors: Assist to bend LLE, pt able to bend RLE w/ cues. Completed half bridge during LE dressing.   Roll Left  Assistance Level: Maximum assistance  Skilled Clinical LABS:                         14.4   6.86  >-----< 214           ( 07-27-21 @ 01:03 )             42.5       141  |  104  |   7  ----------------------< 194    (07-27-21 @ 01:03)     4.0  |  26  |  0.68    Anion Gap: 11    Ca   9.7   (07-27-21 @ 01:03)  Mg   x    (07-27-21 @ 01:03)  Phos x    (07-27-21 @ 01:03)       TP 9.7     |  AST x     -------------------------     Alb x     |  ALT x               (07-27-21 @ 01:03)  -------------------------     T-bili x   |  AlkPh x     D-bili x

## 2023-09-11 NOTE — PROGRESS NOTES
Physical Therapy  Facility/Department: [facility] ACUTE REHAB UNIT  Rehabilitation Physical Therapy Treatment Note    NAME: Stephanie Cox  : 1950 (74 y.o.)  MRN: [id number]  CODE STATUS: Limited    Date of Service: 23       Restrictions:  Restrictions/Precautions: Up as Tolerated, Fall Risk, Modified Diet  Position Activity Restriction  Other position/activity restrictions: up with  assistance     SUBJECTIVE  Subjective  Subjective: Pt positioned partially rolled to the L in a fetal position when PT arrived. Pt agreeable to therapy. Pain: c/o LLE pain when PT extended leg. Pt presents with flexor tone in LLE and appeared to be  muscular pain        Post Treatment Pain Screening         OBJECTIVE  PT and OT worked collaboratively to utilize the skills of 2 disciplines while maximizing functional mobility to obtain optimal potential.    Cognition  Overall Cognitive Status: Exceptions  Arousal/Alertness: Inconsistent responses to stimuli;Delayed responses to stimuli  Following Commands: Inconsistently follows commands; Follows one step commands with repetition; Follows one step commands with increased time  Attention Span: Difficulty dividing attention; Difficulty attending to directions; Unable to maintain attention  Memory: Decreased recall of recent events  Safety Judgement: Decreased awareness of need for safety;Decreased awareness of need for assistance  Problem Solving: Decreased awareness of errors;Assistance required to identify errors made;Assistance required to generate solutions;Assistance required to implement solutions;Assistance required to correct errors made  Insights: Not aware of deficits  Initiation: Requires cues for all  Sequencing: Requires cues for all  Cognition Comment: Pt continues to demo decreased awareness of L side of body and overall poor insight into deficits. Pt is very distractable and req VC/ TC to stay focused on task.  Pt has poor safety awareness especially with midline orientation  Orientation  Overall Orientation Status: Impaired  Orientation Level: Oriented to person    Functional Mobility  Bed Mobility  Overall Assistance Level: Maximum Assistance;Dependent  Additional Factors: Verbal cues; Increased time to complete; With handrails; Head of bed flat  Bridging  Assistance Level: Minimal assistance  Skilled Clinical Factors: Positioned LLE into flex with foot positioned flat as set up for bridging used for scooting both lat ( to L )and caudal > cephalo and for dressing LB. refer to OT for LB  dressing status. .Pt req step by step instructions to sequence the activity. Roll Left  Assistance Level: Minimal assistance; Moderate assistance  Skilled Clinical Factors: Pt req step by step instructions to sequence the activity including positioning of the L extrem,  Roll Right  Assistance Level: Maximum assistance  Skilled Clinical Factors: Pt req step by step instructions to sequence the activity. including having RUE maneuver the LUE across midline and positioning of the LLE into flex for greater ease w/ transition  Supine to Sit  Assistance Level: Maximum assistance  Skilled Clinical Factors: Pt req step by step instructions to sequence the activity. Pt w/ heavy lean to the R req VC/ TC to orient and to maintain midline posion. Once in sitting, pt also tends to lean both forwards and to the L. Increased flex tone in LLE with decreased ability to WB easily when in a sitting position  Scooting  Assistance Level: Requires x 2 assistance;Maximum assistance  Skilled Clinical Factors: Caudal > cephalo( Pt req step by step instructions to sequence the activity+use of headboard since pt used R Hand on headboard to assist with pulling up. BLES flexed and WB through B feet. Note, therapist provided VC for reaching to the R to facilitate WB on R.  Pt was dependent with scooting caudal to cephalo despite the attempts made by pt with max A  Balance  Sitting Balance: Maximum assistance (Heavy lean to

## 2023-09-11 NOTE — PLAN OF CARE
Problem: Discharge Planning  Goal: Discharge to home or other facility with appropriate resources  Outcome: Progressing  Discharge to home or other facility with appropriate resources: Identify barriers to discharge with patient and caregiver     Problem: Safety - Adult  Goal: Free from fall injury  Outcome: Progressing  Note: Patient is able to transfer with max assist x3 using the stedy and has remained free from fall. Problem: Skin/Tissue Integrity  Goal: Absence of new skin breakdown  Description: 1. Monitor for areas of redness and/or skin breakdown  2. Assess vascular access sites hourly  3. Every 4-6 hours minimum:  Change oxygen saturation probe site  4. Every 4-6 hours:  If on nasal continuous positive airway pressure, respiratory therapy assess nares and determine need for appliance change or resting period. Outcome: Progressing     Problem: ABCDS Injury Assessment  Goal: Absence of physical injury  Outcome: Progressing     Problem: Pain  Goal: Verbalizes/displays adequate comfort level or baseline comfort level  Verbalizes/displays adequate comfort level or baseline comfort level: Encourage patient to monitor pain and request assistance  Outcome: Progressing  Note: Patient has remained free from pain. RN able to keep him comfortable with frequent repositioning.      Problem: Chronic Conditions and Co-morbidities  Goal: Patient's chronic conditions and co-morbidity symptoms are monitored and maintained or improved  Outcome: Progressing  Care Plan - Patient's Chronic Conditions and Co-Morbidity Symptoms are Monitored and Maintained or Improved: Monitor and assess patient's chronic conditions and comorbid symptoms for stability, deterioration, or improvement

## 2023-09-12 PROCEDURE — 6370000000 HC RX 637 (ALT 250 FOR IP): Performed by: STUDENT IN AN ORGANIZED HEALTH CARE EDUCATION/TRAINING PROGRAM

## 2023-09-12 PROCEDURE — 97530 THERAPEUTIC ACTIVITIES: CPT

## 2023-09-12 PROCEDURE — 97535 SELF CARE MNGMENT TRAINING: CPT

## 2023-09-12 PROCEDURE — 1280000000 HC REHAB R&B

## 2023-09-12 PROCEDURE — 2580000003 HC RX 258: Performed by: STUDENT IN AN ORGANIZED HEALTH CARE EDUCATION/TRAINING PROGRAM

## 2023-09-12 PROCEDURE — 6370000000 HC RX 637 (ALT 250 FOR IP): Performed by: PHYSICAL MEDICINE & REHABILITATION

## 2023-09-12 PROCEDURE — 6370000000 HC RX 637 (ALT 250 FOR IP): Performed by: INTERNAL MEDICINE

## 2023-09-12 PROCEDURE — 97129 THER IVNTJ 1ST 15 MIN: CPT

## 2023-09-12 PROCEDURE — 97130 THER IVNTJ EA ADDL 15 MIN: CPT

## 2023-09-12 RX ORDER — FLUOXETINE 10 MG/1
10 CAPSULE ORAL DAILY
Status: DISCONTINUED | OUTPATIENT
Start: 2023-09-12 | End: 2023-09-29 | Stop reason: HOSPADM

## 2023-09-12 RX ADMIN — SODIUM CHLORIDE, PRESERVATIVE FREE 10 ML: 5 INJECTION INTRAVENOUS at 13:37

## 2023-09-12 RX ADMIN — EZETIMIBE 10 MG: 10 TABLET ORAL at 19:44

## 2023-09-12 RX ADMIN — TAMSULOSIN HYDROCHLORIDE 0.4 MG: 0.4 CAPSULE ORAL at 08:38

## 2023-09-12 RX ADMIN — VERAPAMIL HYDROCHLORIDE 180 MG: 240 TABLET, FILM COATED, EXTENDED RELEASE ORAL at 19:47

## 2023-09-12 RX ADMIN — NYSTATIN 500000 UNITS: 100000 SUSPENSION ORAL at 14:05

## 2023-09-12 RX ADMIN — Medication 100 MG: at 08:38

## 2023-09-12 RX ADMIN — HYDRALAZINE HYDROCHLORIDE 10 MG: 10 TABLET, FILM COATED ORAL at 08:38

## 2023-09-12 RX ADMIN — CETIRIZINE HYDROCHLORIDE 10 MG: 10 TABLET, FILM COATED ORAL at 19:44

## 2023-09-12 RX ADMIN — SODIUM CHLORIDE, PRESERVATIVE FREE 10 ML: 5 INJECTION INTRAVENOUS at 19:45

## 2023-09-12 RX ADMIN — MONTELUKAST 10 MG: 10 TABLET, FILM COATED ORAL at 19:44

## 2023-09-12 RX ADMIN — Medication 5 MG: at 19:44

## 2023-09-12 RX ADMIN — NYSTATIN 500000 UNITS: 100000 SUSPENSION ORAL at 08:38

## 2023-09-12 ASSESSMENT — PAIN SCALES - GENERAL
PAINLEVEL_OUTOF10: 0

## 2023-09-12 ASSESSMENT — PAIN SCALES - WONG BAKER: WONGBAKER_NUMERICALRESPONSE: 0

## 2023-09-12 NOTE — PROGRESS NOTES
Patient is alert to self with confusion. Follows commands, still flaccid on left side. Patient is interactive and appropriate with staff. Safety measures in place, will continue monitor.

## 2023-09-12 NOTE — PROGRESS NOTES
ACUTE REHAB UNIT  SPEECH/LANGUAGE PATHOLOGY      [x] Daily  [] Weekly Care Conference Note  [] Discharge    Patient:Grady Talavera      XMD:[birth year]  UTF:[id number]  Rehab Dx/Hx: Acute CVA (cerebrovascular accident) ([address]) [I63.9]    Precautions: [] Aspiration  [x] Fall risk  [] Sternal  [] Seizure [] Hip  [] Weight Bearing [x] Other: delirium     ST Dx: [] Aphasia  [] Dysarthria  [] Apraxia   [x] Oropharyngeal dysphagia [x] Cognitive Impairment  [] Other:   Date of Admit: 9/8/2023  Room #: 3109/3109-01  Date: 9/12/2023          Current Diet Order:ADULT DIET; Dysphagia - Soft and Bite Sized  ADULT ORAL NUTRITION SUPPLEMENT; Breakfast, Lunch, Dinner; Standard High Calorie/High Protein Oral Supplement   Recommended Form of Meds: PO  Compensatory Swallowing Strategies : Alternate solids and liquids, Remain upright for 30-45 minutes after meals, Upright as possible for all oral intake, Small bites/sips, Lingual sweep   Lives With: Spouse        Occupation: Retired  Type of Occupation:  for [employer]. Dentition: Adequate  Vision  Vision: Impaired  Vision Exceptions: Wears glasses for reading, Visual field cut  Hearing  Hearing: Within functional limits  Barriers toward progress: Limited insight into deficits        Date: 9/12/2023       Tx session 1 Tx session 2 Tx session 3   Total Timed Code Min 15 30 25   Total Treatment Minutes 30 30 25   Individual Treatment Minutes 30 30 25   Group Treatment Minutes 0 0 0   Co-Treat Minutes 0 0 0   Brief Exception: N/A N/A N/A   Pain None indicated None indicated  None indicated    Pain Intervention: [] RN notified  [] Repositioned  [] Intervention offered and patient declined  [x] N/A  [] Other: [] RN notified  [] Repositioned  [] Intervention offered and patient declined  [x] N/A  [] Other: [] RN notified  [] Repositioned  [] Intervention offered and patient declined  [x] N/A  [] Other:   Subjective:     Pt asleep in bed upon entry.  Agreeable to tx session Unable to determine etiology for hospitalization, required mod cues via binary choice. No awareness to deficits related. Pt required extensive education re: importance and safety of continuing to target L sided visual inattention. Pt will complete basic attention tasks with 60% accuracy and moderate cues. Modify to: Pt will sustain attention to individual tasks with <8 redirections. Pt required MAX cues to re-direct attention back to meal as pt was perseverative with picking at bed sheet. Mod cues for propulsion through meal; verbal responses more timely this date. MAX verbal, tactile, and visual cues to attend to all tasks. Consistent off topic/tangential comments with ? Confabulations, perseverations. Longest sustained attention ~1 minute. Perseverative on moving glasses case and toileting despite MAX cues. Pt will complete L sided visual scanning/attention activities with 80% accuracy. MAX cues given landmarks and visual cues to attend to midline and single instance of progressing past midline to L side. Attempted maintaining eye contact with speaker on the L. X2 instances of slight use of head+eye turn strategy to attend to the L. When provided with visual calendar and green high lighter line drawn on far L edge of paper, continued MAX cues to scan and locate. Pt appeared to be guessing and providing incorrect answers with strong R gaze preference noted. Required tactile cues with SLP assist to physically turn head to the L. Despite MAX visual and verbal, as well as HOHA, pt unable to attend beyond midline during visual scanning task. Visual scanning task then discontinued. Pt will participate in ongoing cognitive-linguistic assessment with additional goals to be established as appropriate. Goal not targeted this session. Pt demo'd significant  perseverations on \"the royals\" and \"king zoe\".  When prompted with questions pt reverted back to talking about \"royalty\", etc. ? Large

## 2023-09-12 NOTE — PROGRESS NOTES
Comprehensive Nutrition Assessment    RECOMMENDATIONS:  PO Diet: Dysphagia- soft & bite sized ; continue   ONS: Ensure +HP TID ; continue   Nutrition Education: Education not indicated     NUTRITION ASSESSMENT:   Nutritional summary & status: Consult r/t new admit to ARU. Pt is on a dysphagia-soft & bite sized diet w/ meal intake limited in data d/t new admit. Pt also receiving Ensure +HP TID w/ meals. Per chart review, PO intake 1-25% and ONS intake %. Noted Speech is following and encouraging oral intakes. Pt was sleeping during RD visit, awoke to voice and answered questions as able while keeping eyes closed. Pt seemed very lethargic during RD visit on this day. Pt reported he has been eating \"great\"; however, po intakes per EMR seemingly inadequate at <50%. Pt endorsed a good appetite on this day. Denied recent wt loss. Pt reports he has been drinking ensure supplements and said \"chocolate\" when asked if pt had flavor preference. Updated order in EMR. Will continue to monitor pt per [facility] throughout admission. Pt discussed in treatment team this AM; unknown d/c date at this time. Will encourage optimal intakes/nutrition status during LOS. RD following. Admission // PMH: PMHx of recent right embolic CVA, right carotid artery stenosis, HTN, HLD, CAD s/p PCI, prostate cancer s/p radiation and currently on Leupron hormonal therapy    MALNUTRITION ASSESSMENT  Context of Malnutrition: Acute Illness   Malnutrition Status:  At risk for malnutrition (Comment)  Findings of the 6 clinical characteristics of malnutrition (Minimum of 2 out of 6 clinical characteristics is required to make the diagnosis of moderate or severe Protein Calorie Malnutrition based on AND/ASPEN Guidelines):  Energy Intake:  Mild decrease in energy intake (Comment)  Weight Loss:  No significant weight loss     Body Fat Loss:  No significant body fat loss     Muscle Mass Loss:  No significant muscle mass loss    Fluid Accumulation:  No

## 2023-09-12 NOTE — PLAN OF CARE
Problem: Discharge Planning  Goal: Discharge to home or other facility with appropriate resources  Outcome: Progressing     Problem: Safety - Adult  Goal: Free from fall injury  Outcome: Progressing  Flowsheets (Taken 9/11/2023 2232)  Free From Fall Injury: Instruct family/caregiver on patient safety     Problem: Skin/Tissue Integrity  Goal: Absence of new skin breakdown  Description: 1. Monitor for areas of redness and/or skin breakdown  2. Assess vascular access sites hourly  3. Every 4-6 hours minimum:  Change oxygen saturation probe site  4. Every 4-6 hours:  If on nasal continuous positive airway pressure, respiratory therapy assess nares and determine need for appliance change or resting period.   Outcome: Progressing     Problem: ABCDS Injury Assessment  Goal: Absence of physical injury  Outcome: Progressing     Problem: Pain  Goal: Verbalizes/displays adequate comfort level or baseline comfort level  Outcome: Progressing  Flowsheets (Taken 9/11/2023 1946)  Verbalizes/displays adequate comfort level or baseline comfort level:   Encourage patient to monitor pain and request assistance   Assess pain using appropriate pain scale   Administer analgesics based on type and severity of pain and evaluate response     Problem: Chronic Conditions and Co-morbidities  Goal: Patient's chronic conditions and co-morbidity symptoms are monitored and maintained or improved  Outcome: Progressing

## 2023-09-12 NOTE — PROGRESS NOTES
handrails; Head of bed flat  Roll Right  Assistance Level: Maximum assistance  Skilled Clinical Factors: Pt req step by step instructions to sequence the activity. including having RUE maneuver the LUE across midline and positioning of the LLE into flex for greater ease w/ transition  Supine to Sit  Assistance Level: Maximum assistance  Skilled Clinical Factors: Pt req step by step instructions to sequence the activity. Pt w/ heavy lean to the R req VC/ TC to orient and to maintain midline posion. Once in sitting, pt also tends to lean both forwards and to the L. Balance  Sitting Balance: Maximum assistance (Sitting on the EOB, heavy left lean unable to self correct, pulling on handrail, trialed placing hands in lap or on the side of the bed, pt pushing more. sitting in the wheelchair for clean up SBA, increased assistance noted when leaning forward)  Transfers  Surface: From bed; Wheelchair  Additional Factors: Set-up; Verbal cues; Hand placement cues; Increased time to complete  Bed To/From Chair  Technique: Sit pivot  Assistance Level: Requires x 2 assistance;Maximum assistance  Skilled Clinical Factors: going to attempt L sided transfers, pt pushing too much to the left was going to lay down into the wheelchair, had to transfer to the right      ASSESSMENT/PROGRESS TOWARDS GOALS       Assessment  Assessment: Pt continues to present with significant cognitive (decreased motor planning, decreased initiation, decreased overall attention, poor safety awareness and significant left sided neglect/attention). Pt without cueing was able to track some to the left when speaking to PT and looking in the mirror. He continues to need significant assistance for all bed mobility, transfers and wheelchair mobility this date. He continues to need two person total assistance for all transfers.  He continues to perform well below his baseline and would benefit from continued therapy in order to improve his functional mobility, transfers, safety amd wheelchair mobility. Activity Tolerance: Patient limited by fatigue;Patient limited by endurance;Treatment limited secondary to decreased cognition  Discharge Recommendations: Continue to assess pending progress;Subacute/Skilled Nursing Facility; Long Term Care with PT  PT Equipment Recommendations  Other: Ongoing assessment at this time    Goals  Patient Goals   Patient Goals : \"get the hell out of here and out of therapy\"  Short Term Goals  Time Frame for Short Term Goals: 14 days( all goals are ongoing)  Short Term Goal 1: Pt will be able to perform bed mobility with max assist of 1 person  Short Term Goal 2: Pt will be able to sit EOB with proper midline positioning for 5 minutes with min A  Short Term Goal 3: Pt will be able to perform a sit pivot to the R via max A  Short Term Goal 4: Pt will be able to propel wheelchair with Mod A with RLE and RUE 20ft  Long Term Goals  Time Frame for Long Term Goals : 21 days  Long Term Goal 1: Pt will be able to perform bed mobility with Min A assist of 1 person  Long Term Goal 2: Pt will be able to perform a sit pivot to the R via Min A  Long Term Goal 3: Pt will be able to perform a sit pivot to the R via Min A  Long Term Goal 4: Pt will be able to propel wheelchair with supervision with ISAIAH and YOVANA 100ft    PLAN OF CARE/SAFETY  Physcial Therapy Plan  Days Per Week: 5 Days  Hours Per Day: 1 hour  Therapy Duration: 3 Weeks  Current Treatment Recommendations: Strengthening;Balance training;Functional mobility training;Transfer training; Endurance training; Safety education & training; Therapeutic activities; Patient/Caregiver education & training; Wheelchair mobility training  Safety Devices  Type of Devices: Call light within reach;Nurse notified; All fall risk precautions in place; Left in chair;Chair alarm in place    EDUCATION  Education  Education Given To: Patient  Education Provided: Transfer Training;Role of Therapy; Family Education; Fall Prevention

## 2023-09-12 NOTE — PROGRESS NOTES
Occupational Therapy  Facility/Department: [facility] ACUTE REHAB UNIT  Rehabilitation Occupational Therapy Daily Treatment Note    Date: 23  Patient Name: Wicho Beck       Room: 3109/3109-01  MRN: [id number]  Account: [de-identified]   : 1950  (74 y.o.) Gender: male                    Past Medical History:  has a past medical history of Angioedema, ED (erectile dysfunction), History of colon polyps, History of Aj-Barr virus infection, History of rectal polyps, Idiopathic urticaria, MI (myocardial infarction) ([address]), Osteoarthritis, RA (rheumatoid arthritis) ([address]), Tear of medial cartilage or meniscus of knee, current, and Unspecified essential hypertension. Past Surgical History:   has a past surgical history that includes Cardiac surgery; other surgical history (2018); hip surgery; Coronary angioplasty with stent (); pr egd transoral biopsy single/multiple (N/A, 2018); Colonoscopy (N/A, 2018); Appendectomy; pr dstrj lesion anus simple surg excision (N/A, 10/9/2018); pr colonoscopy flx dx w/collj spec when pfrmd (N/A, 10/9/2018); Colonoscopy (N/A, 2019); Knee arthroscopy (Bilateral); and Colonoscopy (N/A, 3/5/2020). Restrictions  Restrictions/Precautions: Up as Tolerated, Fall Risk, Modified Diet  Other position/activity restrictions: up with  assistance    Subjective  Subjective: Pt met supine in bed and agreeable to OT sesssion. Cotx for maximizing theraputic potention. Pt reporting pain with movement but not specific on location. RN aware and pt repositioned to comfort at end of session  Restrictions/Precautions: Up as Tolerated; Fall Risk;Modified Diet             Objective     Cognition  Overall Cognitive Status: Exceptions  Arousal/Alertness: Inconsistent responses to stimuli;Delayed responses to stimuli  Following Commands: Inconsistently follows commands; Follows one step commands with repetition; Follows one step commands with increased time  Attention Span: Difficulty

## 2023-09-13 ENCOUNTER — APPOINTMENT (OUTPATIENT)
Dept: GENERAL RADIOLOGY | Age: 73
DRG: 057 | End: 2023-09-13
Attending: STUDENT IN AN ORGANIZED HEALTH CARE EDUCATION/TRAINING PROGRAM
Payer: MEDICARE

## 2023-09-13 LAB
ANION GAP SERPL CALCULATED.3IONS-SCNC: 12 MMOL/L (ref 3–16)
BUN SERPL-MCNC: 22 MG/DL (ref 7–20)
CALCIUM SERPL-MCNC: 9.9 MG/DL (ref 8.3–10.6)
CHLORIDE SERPL-SCNC: 107 MMOL/L (ref 99–110)
CO2 SERPL-SCNC: 24 MMOL/L (ref 21–32)
CREAT SERPL-MCNC: 0.6 MG/DL (ref 0.8–1.3)
DEPRECATED RDW RBC AUTO: 12.4 % (ref 12.4–15.4)
GFR SERPLBLD CREATININE-BSD FMLA CKD-EPI: >60 ML/MIN/{1.73_M2}
GLUCOSE SERPL-MCNC: 129 MG/DL (ref 70–99)
HCT VFR BLD AUTO: 38.7 % (ref 40.5–52.5)
HGB BLD-MCNC: 13.8 G/DL (ref 13.5–17.5)
MCH RBC QN AUTO: 31.5 PG (ref 26–34)
MCHC RBC AUTO-ENTMCNC: 35.5 G/DL (ref 31–36)
MCV RBC AUTO: 88.6 FL (ref 80–100)
PLATELET # BLD AUTO: 203 K/UL (ref 135–450)
PMV BLD AUTO: 9 FL (ref 5–10.5)
POTASSIUM SERPL-SCNC: 3.6 MMOL/L (ref 3.5–5.1)
RBC # BLD AUTO: 4.37 M/UL (ref 4.2–5.9)
SODIUM SERPL-SCNC: 143 MMOL/L (ref 136–145)
WBC # BLD AUTO: 7.4 K/UL (ref 4–11)

## 2023-09-13 PROCEDURE — 92526 ORAL FUNCTION THERAPY: CPT

## 2023-09-13 PROCEDURE — 97129 THER IVNTJ 1ST 15 MIN: CPT

## 2023-09-13 PROCEDURE — 1280000000 HC REHAB R&B

## 2023-09-13 PROCEDURE — 97530 THERAPEUTIC ACTIVITIES: CPT

## 2023-09-13 PROCEDURE — 74230 X-RAY XM SWLNG FUNCJ C+: CPT

## 2023-09-13 PROCEDURE — 6370000000 HC RX 637 (ALT 250 FOR IP): Performed by: PHYSICAL MEDICINE & REHABILITATION

## 2023-09-13 PROCEDURE — 6370000000 HC RX 637 (ALT 250 FOR IP): Performed by: STUDENT IN AN ORGANIZED HEALTH CARE EDUCATION/TRAINING PROGRAM

## 2023-09-13 PROCEDURE — 2580000003 HC RX 258: Performed by: STUDENT IN AN ORGANIZED HEALTH CARE EDUCATION/TRAINING PROGRAM

## 2023-09-13 PROCEDURE — 80048 BASIC METABOLIC PNL TOTAL CA: CPT

## 2023-09-13 PROCEDURE — 97535 SELF CARE MNGMENT TRAINING: CPT

## 2023-09-13 PROCEDURE — 92611 MOTION FLUOROSCOPY/SWALLOW: CPT

## 2023-09-13 PROCEDURE — 36415 COLL VENOUS BLD VENIPUNCTURE: CPT

## 2023-09-13 PROCEDURE — 97130 THER IVNTJ EA ADDL 15 MIN: CPT

## 2023-09-13 PROCEDURE — 85027 COMPLETE CBC AUTOMATED: CPT

## 2023-09-13 PROCEDURE — 6370000000 HC RX 637 (ALT 250 FOR IP): Performed by: INTERNAL MEDICINE

## 2023-09-13 PROCEDURE — 97110 THERAPEUTIC EXERCISES: CPT

## 2023-09-13 RX ADMIN — MONTELUKAST 10 MG: 10 TABLET, FILM COATED ORAL at 22:34

## 2023-09-13 RX ADMIN — OXYCODONE HYDROCHLORIDE 5 MG: 5 TABLET ORAL at 17:14

## 2023-09-13 RX ADMIN — NYSTATIN 500000 UNITS: 100000 SUSPENSION ORAL at 09:52

## 2023-09-13 RX ADMIN — SODIUM CHLORIDE, PRESERVATIVE FREE 10 ML: 5 INJECTION INTRAVENOUS at 09:52

## 2023-09-13 RX ADMIN — TAMSULOSIN HYDROCHLORIDE 0.4 MG: 0.4 CAPSULE ORAL at 09:51

## 2023-09-13 RX ADMIN — NYSTATIN 500000 UNITS: 100000 SUSPENSION ORAL at 13:03

## 2023-09-13 RX ADMIN — EZETIMIBE 10 MG: 10 TABLET ORAL at 22:34

## 2023-09-13 RX ADMIN — Medication 5 MG: at 22:39

## 2023-09-13 RX ADMIN — SODIUM CHLORIDE, PRESERVATIVE FREE 10 ML: 5 INJECTION INTRAVENOUS at 22:38

## 2023-09-13 RX ADMIN — CETIRIZINE HYDROCHLORIDE 10 MG: 10 TABLET, FILM COATED ORAL at 22:34

## 2023-09-13 RX ADMIN — NYSTATIN 500000 UNITS: 100000 SUSPENSION ORAL at 22:33

## 2023-09-13 RX ADMIN — FLUOXETINE 10 MG: 10 CAPSULE ORAL at 09:51

## 2023-09-13 RX ADMIN — Medication 100 MG: at 09:51

## 2023-09-13 ASSESSMENT — PAIN DESCRIPTION - ONSET
ONSET: GRADUAL
ONSET: GRADUAL
ONSET: ON-GOING

## 2023-09-13 ASSESSMENT — PAIN SCALES - GENERAL
PAINLEVEL_OUTOF10: 0
PAINLEVEL_OUTOF10: 8
PAINLEVEL_OUTOF10: 2
PAINLEVEL_OUTOF10: 0
PAINLEVEL_OUTOF10: 8
PAINLEVEL_OUTOF10: 0

## 2023-09-13 ASSESSMENT — PAIN - FUNCTIONAL ASSESSMENT
PAIN_FUNCTIONAL_ASSESSMENT: ACTIVITIES ARE NOT PREVENTED

## 2023-09-13 ASSESSMENT — PAIN DESCRIPTION - DESCRIPTORS
DESCRIPTORS: PATIENT UNABLE TO DESCRIBE

## 2023-09-13 ASSESSMENT — PAIN DESCRIPTION - ORIENTATION
ORIENTATION: LEFT

## 2023-09-13 ASSESSMENT — PAIN DESCRIPTION - PAIN TYPE
TYPE: ACUTE PAIN

## 2023-09-13 ASSESSMENT — PAIN DESCRIPTION - LOCATION
LOCATION: HEAD

## 2023-09-13 ASSESSMENT — PAIN DESCRIPTION - FREQUENCY
FREQUENCY: INTERMITTENT

## 2023-09-13 NOTE — PROGRESS NOTES
Physical Therapy  Facility/Department: [facility] ACUTE REHAB UNIT  Rehabilitation Physical Therapy Treatment Note    NAME: Wil Saleh  : 1950 (74 y.o.)  MRN: [id number]  CODE STATUS: Limited    Date of Service: 23       Restrictions:  Restrictions/Precautions: Up as Tolerated, Fall Risk, Modified Diet  Position Activity Restriction  Other position/activity restrictions: up with  assistance     SUBJECTIVE  Subjective  Subjective: Pt sitting in the wheelchair, agreeable to therapy but does not want shower. Pain: no pain reported         OBJECTIVE  Cognition  Overall Cognitive Status: Exceptions  Arousal/Alertness: Inconsistent responses to stimuli;Delayed responses to stimuli  Following Commands: Inconsistently follows commands; Follows one step commands with repetition; Follows one step commands with increased time  Attention Span: Difficulty dividing attention; Difficulty attending to directions; Unable to maintain attention  Memory: Decreased recall of recent events  Safety Judgement: Decreased awareness of need for safety;Decreased awareness of need for assistance  Problem Solving: Decreased awareness of errors;Assistance required to identify errors made;Assistance required to generate solutions;Assistance required to implement solutions;Assistance required to correct errors made  Insights: Not aware of deficits  Initiation: Requires cues for all  Sequencing: Requires cues for all  Orientation  Overall Orientation Status: Impaired  Orientation Level: Oriented to person    Functional Mobility  Bed Mobility  Overall Assistance Level: Dependent  Additional Factors: Verbal cues; Increased time to complete; With handrails; Head of bed flat  Roll Left  Assistance Level: Maximum assistance  Skilled Clinical Factors: Pt req step by step instructions to sequence the activity including positioning of the L extreme  Roll Right  Assistance Level: Dependent  Skilled Clinical Factors: Pt req step by step instructions to precautions in place; Bed alarm in place; Left in bed    EDUCATION  Education  Education Given To: Patient; Family  Education Provided: Transfer Training;Role of Therapy; Family Education; Fall Prevention Strategies; Energy Conservation; Safety  Education Provided Comments: Importance of standing in standing frame, and LUE support  Education Method: Demonstration;Verbal  Barriers to Learning: Cognition;Vision  Education Outcome: Continued education needed        Therapy Time   Individual Concurrent Group Co-treatment   Time In       1400   Time Out       1500   Minutes       60      Timed Code Treatment Minutes:   60    Total Treatment Minutes:  [address] [place], [place], 09/13/23 at 3:41 PM

## 2023-09-13 NOTE — PROGRESS NOTES
Pt found resting in bed with his eyes closed. Physical assessment and vital signs as charted. Pt currently denies experiencing any pain at this time. Bed alarm engaged. Call light placed within reach. RN will continue to monitor Pt.

## 2023-09-13 NOTE — PROGRESS NOTES
ACUTE REHAB UNIT  SPEECH/LANGUAGE PATHOLOGY      [x] Daily  [] Weekly Care Conference Note  [] Discharge    Patient:Grady Jiang      KPL:[birth year]  ONS:[id number]  Rehab Dx/Hx: Acute CVA (cerebrovascular accident) ([address]) [I63.9]    Precautions: [] Aspiration  [x] Fall risk  [] Sternal  [] Seizure [] Hip  [] Weight Bearing [x] Other: delirium     ST Dx: [] Aphasia  [] Dysarthria  [] Apraxia   [x] Oropharyngeal dysphagia [x] Cognitive Impairment  [] Other:   Date of Admit: 9/8/2023  Room #: 3109/3109-01  Date: 9/13/2023          Current Diet Order:ADULT DIET; Dysphagia - Soft and Bite Sized, Thin liquids with single sip and double hard swallow  ADULT ORAL NUTRITION SUPPLEMENT; Breakfast, Lunch, Dinner; Standard High Calorie/High Protein Oral Supplement   Recommended Form of Meds: PO  Compensatory Swallowing Strategies : Alternate solids and liquids, Remain upright for 30-45 minutes after meals, Upright as possible for all oral intake, Small bites/sips, Lingual sweep, single sip, double hard swallows  Lives With: Spouse        Occupation: Retired  Type of Occupation:  for [employer]. Dentition: Adequate  Vision  Vision: Impaired  Vision Exceptions: Wears glasses for reading, Visual field cut  Hearing  Hearing: Within functional limits  Barriers toward progress: Limited insight into deficits        Date: 9/13/2023      Tx session 1 Tx session 2   Total Timed Code Min 10 30   Total Treatment Minutes 20 30   Individual Treatment Minutes 20 30   Group Treatment Minutes 0 0   Co-Treat Minutes 0 0   Brief Exception: N/A N/A   Pain None indicated None indicated    Pain Intervention: [] RN notified  [] Repositioned  [] Intervention offered and patient declined  [x] N/A  [] Other: [] RN notified  [] Repositioned  [] Intervention offered and patient declined  [x] N/A  [] Other:   Subjective:     Pt asleep in bed upon entry and agreeable to tx session. Seen with meal tray. Pt seated upright in bed.

## 2023-09-13 NOTE — PATIENT CARE CONFERENCE
Inpatient Rehabilitation  Weekly Team Conference Note  The [id number] [facility]  [address] [place] [address]  [phone]  Patient Name: Gee Nunes        MRN: [id number]    : 1950  (74 y.o.)  Gender: male   Referring Practitioner: Yazmin Escoto MD  Diagnosis: Acute CVA  The team conference for this patient was held on 2023 at 10:30am by:  Carlos Shelby.  DO Keri    Current/Goal QM SCORES  QM Current/Goal Score   Eating CARE Score: 4 / Discharge Goal: Set-up or clean-up assistance   Oral Hygiene CARE Score: 3 / Discharge Goal: Supervision or touching assistance   Shower/Bathing CARE Score: 1 / Discharge Goal: Partial/moderate assistance   UB Dressing CARE Score: 3 / Discharge Goal: Supervision or touching assistance   LB Dressing CARE Score: 2 / Discharge Goal: Partial/moderate assistance   Putting on/off Footwear CARE Score: 1 / Discharge Goal: Partial/moderate assistance   Toileting Hygiene CARE Score: 1 / Discharge Goal: Partial/moderate assistance   Bladder Continence Bladder Continence: Incontinent daily    Bowel Continence Bowel Continence: Always continent    Toilet Transfers CARE Score: 80 / Discharge Goal: Partial/moderate assistance   Shower/Bathe Self  CARE Score: 1 / Discharge Goal: Partial/moderate assistance   Rolling Left and Right CARE Score: 2 / Discharge Goal: Partial/moderate assistance   Sit to Lying CARE Score: 1 / Discharge Goal: Partial/moderate assistance   Lying to Sitting on Bedside CARE Score: 1 / Discharge Goal: Partial/moderate assistance   Sit to Stand CARE Score: 1 / Discharge Goal: Partial/moderate assistance   Chair/Bed to Chair Transfer CARE Score: 1 / Discharge Goal: Partial/moderate assistance   Car Transfers CARE Score: 88 / Discharge Goal: Partial/moderate assistance   Walk 10 Feet CARE Score: 88 / Discharge Goal: Not Applicable   Walk 50 Feet with Two Turns CARE Score: 88 / Discharge Goal: Not Applicable   Walk 181 Feet CARE Standard High Calorie/High Protein Oral Supplement         Feeding: Needs assist, Needs set up (encouraged pt to eat; pt did not want to)  Room Service: Selective   NSG Recorded PO: PO Meals Eaten (%): 1 - 25% PO Supplement (%): 1 - 25%  Malnutrition Status Malnutrition Status: At risk for malnutrition (Comment)      CASE MANAGEMENT:  Psychosocial/Behavioral Issues: None  Assessment:  DC Date: TBD    Patient/Family Education provided by team:  Role of PT/OT, Nursing, safety education, fall prevention, stroke prevention    Patient Goals for Rehab stay:  1. To go home, \"to get the hell out of therapy\"     Rehab Team Goals for patient for the Upcoming Week:  1. Increase awareness to L side  2. Increase independence w/ ADLs  3. Increase sitting balance in prep for sit pivot transfers    Barriers to Progress/Attainment of Goals & Efforts/Interventions to remove Barriers:  1. Significant L side inattention/possible visual field cut  2. Decreased sitting balance, heavy assist for transfers  3. Decreased ADL independence    Discharge Plan:  Estimated Length of Stay: 21 days  Destination: undetermined at this time  Services at Discharge:  Will need all services at discharge, but undetermined what setting  Equipment at Discharge: TBD pending d/c dispo  Community Resources:   Factors facilitating achievement of predicted outcomes: Family support and Sense of humor  Barriers to the achievement of predicted outcomes: No caregiver support, Cognitive deficit, Impulsivity, Limited safety awareness, Limited insight into deficits, Decreased endurance, Upper extremity weakness, Lower extremity weakness, Impaired vision, Incontinence of bowel, and Incontinence of bladder    Special Needs in the Upcoming Week:   [x] Family/Caregiver Education  [] Home visit  []Therapeutic Pass [] Consults:_______   [] Family/Caregiver Training  [] Stroke Risk Factor Education     [] Other;_______     TEAM SUMMARY: Will continue with current poc & goals

## 2023-09-13 NOTE — PROCEDURES
INSTRUMENTAL SWALLOW REPORT  MODIFIED BARIUM SWALLOW    NAME: Jose Moss   : 1950  MRN: [id number]       Date of Eval: 2023     Ordering Physician: Dr. Maria T Hu  Radiologist: Dr. Kimberli Patricia     Referring Diagnosis(es): Referring Diagnosis: CVA    Past Medical History:  has a past medical history of Angioedema, ED (erectile dysfunction), History of colon polyps, History of Aj-Barr virus infection, History of rectal polyps, Idiopathic urticaria, MI (myocardial infarction) ([address]), Osteoarthritis, RA (rheumatoid arthritis) ([address]), Tear of medial cartilage or meniscus of knee, current, and Unspecified essential hypertension. Past Surgical History:  has a past surgical history that includes Cardiac surgery; other surgical history (2018); hip surgery; Coronary angioplasty with stent (); pr egd transoral biopsy single/multiple (N/A, 2018); Colonoscopy (N/A, 2018); Appendectomy; pr dstrj lesion anus simple surg excision (N/A, 10/9/2018); pr colonoscopy flx dx w/collj spec when pfrmd (N/A, 10/9/2018); Colonoscopy (N/A, 2019); Knee arthroscopy (Bilateral); and Colonoscopy (N/A, 3/5/2020). Type of Study: Initial MBS    Patient Complaints/Reason for Referral:  Jose Moss was referred for a MBS to assess the efficiency of his/her swallow function, assess for aspiration, and to make recommendations regarding safe dietary consistencies, effective compensatory strategies, and safe eating environment. Patient complaints: Unable to state    Onset of problem:   Date of Onset: 2023  Subjective  Subjective: Presents for MBSS due to coughing noted with sequential trials of thin liquids. Behavior/Cognition/Vision/Hearing:  Behavior/Cognition: Cooperative;Confused  Patient Position: Lateral and  Upright  Consistencies Administered: Regular; Thin cup; Thin teaspoon; Thin straw (Pudding thick tsp)    Impressions:  Pt presents with perioral weakness resulting in escape of liquids beyond dysphagia- Intermittent supervision/cueing. One - two diet consistencies restricted  Penetration-Aspiration Scale (PAS): 8 - Material Enters the airway, passes below the vocal folds, and no effort is made to eject    Recommended Diet:  Solid consistency: Soft and Bite-Sized  Liquid consistency: Thin  Liquid administration via: Cup;Straw (single sips)  Medication administration: Meds in puree (crushed or whole (nursing reports pt chewing))    Safe Swallow Protocol:  Supervision: 1:1  Compensatory Swallowing Strategies : Alternate solids and liquids; Remain upright for 30-45 minutes after meals;Upright as possible for all oral intake;Small bites/sips; Lingual sweep (Single sips thin liquids, double hard swallow for liquids)    Recommendations/Treatment  Requires SLP Intervention: Yes  Recommendations: F/U MBS  D/C Recommendations: Ongoing speech therapy is recommended during this hospitalization     Recommended Exercises: Resistive lingual exercises, CTAR, effortful swallows      Education: Images and recommendations were reviewed with following this exam.   Patient Education: Pt educated to results of MBS with use of video with multiple repetitions of compensation and dysfunction. Pt unable to complete immediate / teach back of information. Patient Education Response: Needs reinforcement; No evidence of learning    Safety Devices  Safety Devices in place: Yes  Type of devices: Chair alarm in place;Call light within reach (Reviewed correct use of call light; pt unable to report)    Goals:    Short Term:  Goal 1: Pt will tolerate recommended diet w/o overt s/s of aspiration or respiratory decline. Goal 2: Pt/caregiver will demonstrate accurate use or cueing for swallow strategies. Goal 3: Pt will demonstrate improved oral efficiency as evidenced by less than min oral residue across a mealtime assessment.      Pain   Pain Level: 0  Pain Type: Acute pain, Chronic pain  Pain Location: Generalized    Therapy Time:

## 2023-09-13 NOTE — PROGRESS NOTES
Occupational Therapy  Facility/Department: [facility] ACUTE REHAB UNIT  Rehabilitation Occupational Therapy Daily Treatment Note    Date: 23  Patient Name: Kyra Stearns       Room: 3109/3109-01  MRN: [id number]  Account: [de-identified]   : 1950  (74 y.o.) Gender: male                    Past Medical History:  has a past medical history of Angioedema, ED (erectile dysfunction), History of colon polyps, History of Aj-Barr virus infection, History of rectal polyps, Idiopathic urticaria, MI (myocardial infarction) ([address]), Osteoarthritis, RA (rheumatoid arthritis) ([address]), Tear of medial cartilage or meniscus of knee, current, and Unspecified essential hypertension. Past Surgical History:   has a past surgical history that includes Cardiac surgery; other surgical history (2018); hip surgery; Coronary angioplasty with stent (); pr egd transoral biopsy single/multiple (N/A, 2018); Colonoscopy (N/A, 2018); Appendectomy; pr dstrj lesion anus simple surg excision (N/A, 10/9/2018); pr colonoscopy flx dx w/collj spec when pfrmd (N/A, 10/9/2018); Colonoscopy (N/A, 2019); Knee arthroscopy (Bilateral); and Colonoscopy (N/A, 3/5/2020). Restrictions  Restrictions/Precautions: Up as Tolerated, Fall Risk, Modified Diet  Other position/activity restrictions: up with  assistance    Subjective  Subjective: Pt seated in w/c upon OT arrival. Disoriented and poor insight into deficits \"let me just walk to the door. \" Pt denied pain, but reported pain w/ movement of L side. LUE protected during transfers. Co-tx indicated in order to maximize therapeutic potential.  Restrictions/Precautions: Up as Tolerated; Fall Risk;Modified Diet             Objective     Cognition  Overall Cognitive Status: Exceptions  Arousal/Alertness: Inconsistent responses to stimuli;Delayed responses to stimuli  Following Commands: Inconsistently follows commands; Follows one step commands with repetition; Follows one step commands with training    Goals  Patient Goals   Patient goals : To get stronger. Short Term Goals  Time Frame for Short Term Goals: 14 days  Short Term Goal 1: Pt will complete LE dressing w/ max A x1. - continue for consistency  Short Term Goal 2: Pt will complete toileting w/ max A x1. Short Term Goal 3: Pt complete UE dressing w/ max A x1. Short Term Goal 4: Pt will complete oral care w/ mod A. - continue for consistency  Short Term Goal 5: Pt will complete bathing w/ max A x1. Long Term Goals  Time Frame for Long Term Goals : 21 days  Long Term Goal 1: Pt will complete LE dressing w/ mod A x.1  Long Term Goal 2: Pt will complete toileting w/ mod A x1. Long Term Goal 3: Pt will complete UE dressing w/ CGA. Long Term Goal 4: Pt will complete oral care w/ SPV. Long Term Goal 5: Pt will complete bathing w/ mod A x1.                    Therapy Time   Individual Concurrent Group Co-treatment   Time In       1400   Time Out       1500   Minutes       60       Timed Code Treatment Minutes:  60 min     Total Treatment Minutes: 96 min     Lizeth Samano OT

## 2023-09-13 NOTE — PLAN OF CARE
Problem: Discharge Planning  Goal: Discharge to home or other facility with appropriate resources  Outcome: Progressing     Problem: Safety - Adult  Goal: Free from fall injury  Outcome: Progressing  Flowsheets (Taken 9/12/2023 2228)  Free From Fall Injury: Instruct family/caregiver on patient safety     Problem: Skin/Tissue Integrity  Goal: Absence of new skin breakdown  Description: 1. Monitor for areas of redness and/or skin breakdown  2. Assess vascular access sites hourly  3. Every 4-6 hours minimum:  Change oxygen saturation probe site  4. Every 4-6 hours:  If on nasal continuous positive airway pressure, respiratory therapy assess nares and determine need for appliance change or resting period.   Outcome: Progressing     Problem: ABCDS Injury Assessment  Goal: Absence of physical injury  Outcome: Progressing     Problem: Pain  Goal: Verbalizes/displays adequate comfort level or baseline comfort level  Outcome: Progressing  Flowsheets (Taken 9/12/2023 1938)  Verbalizes/displays adequate comfort level or baseline comfort level:   Encourage patient to monitor pain and request assistance   Assess pain using appropriate pain scale   Administer analgesics based on type and severity of pain and evaluate response     Problem: Chronic Conditions and Co-morbidities  Goal: Patient's chronic conditions and co-morbidity symptoms are monitored and maintained or improved  Outcome: Progressing     Problem: Nutrition Deficit:  Goal: Optimize nutritional status  Outcome: Progressing

## 2023-09-14 PROCEDURE — 97032 APPL MODALITY 1+ESTIM EA 15: CPT

## 2023-09-14 PROCEDURE — 97130 THER IVNTJ EA ADDL 15 MIN: CPT

## 2023-09-14 PROCEDURE — 6370000000 HC RX 637 (ALT 250 FOR IP): Performed by: INTERNAL MEDICINE

## 2023-09-14 PROCEDURE — 97530 THERAPEUTIC ACTIVITIES: CPT

## 2023-09-14 PROCEDURE — 97535 SELF CARE MNGMENT TRAINING: CPT

## 2023-09-14 PROCEDURE — 6370000000 HC RX 637 (ALT 250 FOR IP): Performed by: STUDENT IN AN ORGANIZED HEALTH CARE EDUCATION/TRAINING PROGRAM

## 2023-09-14 PROCEDURE — 1280000000 HC REHAB R&B

## 2023-09-14 PROCEDURE — 6370000000 HC RX 637 (ALT 250 FOR IP): Performed by: PHYSICAL MEDICINE & REHABILITATION

## 2023-09-14 PROCEDURE — 2580000003 HC RX 258: Performed by: STUDENT IN AN ORGANIZED HEALTH CARE EDUCATION/TRAINING PROGRAM

## 2023-09-14 PROCEDURE — 97542 WHEELCHAIR MNGMENT TRAINING: CPT

## 2023-09-14 PROCEDURE — 92526 ORAL FUNCTION THERAPY: CPT

## 2023-09-14 PROCEDURE — 97112 NEUROMUSCULAR REEDUCATION: CPT

## 2023-09-14 PROCEDURE — 97129 THER IVNTJ 1ST 15 MIN: CPT

## 2023-09-14 RX ADMIN — Medication 100 MG: at 09:38

## 2023-09-14 RX ADMIN — CETIRIZINE HYDROCHLORIDE 10 MG: 10 TABLET, FILM COATED ORAL at 20:46

## 2023-09-14 RX ADMIN — OXYCODONE HYDROCHLORIDE 5 MG: 5 TABLET ORAL at 20:46

## 2023-09-14 RX ADMIN — NYSTATIN 500000 UNITS: 100000 SUSPENSION ORAL at 09:38

## 2023-09-14 RX ADMIN — TAMSULOSIN HYDROCHLORIDE 0.4 MG: 0.4 CAPSULE ORAL at 09:38

## 2023-09-14 RX ADMIN — NYSTATIN 500000 UNITS: 100000 SUSPENSION ORAL at 20:46

## 2023-09-14 RX ADMIN — NYSTATIN 500000 UNITS: 100000 SUSPENSION ORAL at 13:10

## 2023-09-14 RX ADMIN — MONTELUKAST 10 MG: 10 TABLET, FILM COATED ORAL at 20:46

## 2023-09-14 RX ADMIN — VERAPAMIL HYDROCHLORIDE 180 MG: 240 TABLET, FILM COATED, EXTENDED RELEASE ORAL at 20:50

## 2023-09-14 RX ADMIN — Medication 5 MG: at 20:46

## 2023-09-14 RX ADMIN — NYSTATIN 500000 UNITS: 100000 SUSPENSION ORAL at 18:22

## 2023-09-14 RX ADMIN — FLUOXETINE 10 MG: 10 CAPSULE ORAL at 09:38

## 2023-09-14 RX ADMIN — ACETAMINOPHEN 650 MG: 325 TABLET ORAL at 11:58

## 2023-09-14 RX ADMIN — EZETIMIBE 10 MG: 10 TABLET ORAL at 20:46

## 2023-09-14 RX ADMIN — SODIUM CHLORIDE, PRESERVATIVE FREE 10 ML: 5 INJECTION INTRAVENOUS at 13:11

## 2023-09-14 ASSESSMENT — PAIN SCALES - GENERAL
PAINLEVEL_OUTOF10: 0
PAINLEVEL_OUTOF10: 10
PAINLEVEL_OUTOF10: 7
PAINLEVEL_OUTOF10: 0

## 2023-09-14 ASSESSMENT — PAIN DESCRIPTION - LOCATION
LOCATION: HEAD
LOCATION: HEAD

## 2023-09-14 ASSESSMENT — PAIN DESCRIPTION - PAIN TYPE
TYPE: ACUTE PAIN
TYPE: ACUTE PAIN

## 2023-09-14 ASSESSMENT — PAIN DESCRIPTION - ONSET
ONSET: SUDDEN
ONSET: GRADUAL

## 2023-09-14 ASSESSMENT — PAIN DESCRIPTION - DESCRIPTORS
DESCRIPTORS: ACHING
DESCRIPTORS: ACHING

## 2023-09-14 ASSESSMENT — PAIN SCALES - WONG BAKER
WONGBAKER_NUMERICALRESPONSE: 0
WONGBAKER_NUMERICALRESPONSE: 0
WONGBAKER_NUMERICALRESPONSE: 6
WONGBAKER_NUMERICALRESPONSE: 0

## 2023-09-14 ASSESSMENT — PAIN DESCRIPTION - FREQUENCY
FREQUENCY: INTERMITTENT
FREQUENCY: INTERMITTENT

## 2023-09-14 ASSESSMENT — PAIN DESCRIPTION - ORIENTATION
ORIENTATION: ANTERIOR;RIGHT
ORIENTATION: ANTERIOR;RIGHT

## 2023-09-14 NOTE — PROGRESS NOTES
Physical Therapy  Facility/Department: [facility] ACUTE REHAB UNIT  Rehabilitation Physical Therapy Treatment Note    NAME: Saray Mendoza  : 1950 (74 y.o.)  MRN: [id number]  CODE STATUS: Limited    Date of Service: 23       Restrictions:  Restrictions/Precautions: Up as Tolerated, Fall Risk, Modified Diet  Position Activity Restriction  Other position/activity restrictions: up with  assistance     SUBJECTIVE  Subjective  Subjective: Pt sleeping in bed, agreeable to therapy. Pt with noticable right gaze unable to track  Pain: no pain reported            OBJECTIVE  Cognition  Arousal/Alertness: Inconsistent responses to stimuli;Delayed responses to stimuli  Following Commands: Inconsistently follows commands; Follows one step commands with repetition; Follows one step commands with increased time  Attention Span: Difficulty dividing attention; Difficulty attending to directions; Unable to maintain attention  Memory: Decreased recall of recent events  Safety Judgement: Decreased awareness of need for safety;Decreased awareness of need for assistance  Problem Solving: Decreased awareness of errors;Assistance required to identify errors made;Assistance required to generate solutions;Assistance required to implement solutions;Assistance required to correct errors made  Insights: Not aware of deficits  Initiation: Requires cues for all  Sequencing: Requires cues for all  Orientation  Overall Orientation Status: Impaired  Orientation Level: Disoriented to place; Disoriented to situation    Functional Mobility  Bed Mobility  Overall Assistance Level: Dependent  Additional Factors: Verbal cues; Increased time to complete; With handrails; Head of bed flat  Roll Left  Assistance Level:  Moderate assistance  Skilled Clinical Factors: Pt needing hand over hand to placing on the handrail increased VC for hip control  Roll Right  Assistance Level: Dependent  Skilled Clinical Factors: Increased assistance for donning pants in the bed,

## 2023-09-14 NOTE — PLAN OF CARE
Problem: Nutrition Deficit:  Goal: Optimize nutritional status  Outcome: Not Progressing    Appetite is poor. Supplements provided. Problem: Safety - Adult  Goal: Free from fall injury  Outcome: Progressing       Problem: Skin/Tissue Integrity  Goal: Absence of new skin breakdown  Description: 1. Monitor for areas of redness and/or skin breakdown  2. Assess vascular access sites hourly  3. Every 4-6 hours minimum:  Change oxygen saturation probe site  4. Every 4-6 hours:  If on nasal continuous positive airway pressure, respiratory therapy assess nares and determine need for appliance change or resting period. Outcome: Progressing     Problem: ABCDS Injury Assessment  Goal: Absence of physical injury  Outcome: Progressing     Problem: Pain  Goal: Verbalizes/displays adequate comfort level or baseline comfort level  Outcome: Progressing     Problem: Chronic Conditions and Co-morbidities  Goal: Patient's chronic conditions and co-morbidity symptoms are monitored and maintained or improved  Outcome: Progressing/Not Progressing.   Flowsheets (Taken 9/14/2023 0913)  Care Plan - Patient's Chronic Conditions and Co-Morbidity Symptoms are Monitored and Maintained or Improved: Monitor and assess patient's chronic conditions and comorbid symptoms for stability, deterioration, or improvement     Problem: Discharge Planning  Goal: Discharge to home or other facility with appropriate resources  Recent Flowsheet Documentation  Taken 9/14/2023 0913 by Maria M Nagel RN  Discharge to home or other facility with appropriate resources: Identify barriers to discharge with patient and caregiver

## 2023-09-14 NOTE — PROGRESS NOTES
Occupational Therapy  Facility/Department: [facility] ACUTE REHAB UNIT  Rehabilitation Occupational Therapy Daily Treatment Note    Date: 23  Patient Name: Annia Field       Room: 3109/3109-01  MRN: [id number]  Account: [de-identified]   : 1950  (74 y.o.) Gender: male                    Past Medical History:  has a past medical history of Angioedema, ED (erectile dysfunction), History of colon polyps, History of Aj-Barr virus infection, History of rectal polyps, Idiopathic urticaria, MI (myocardial infarction) ([address]), Osteoarthritis, RA (rheumatoid arthritis) ([address]), Tear of medial cartilage or meniscus of knee, current, and Unspecified essential hypertension. Past Surgical History:   has a past surgical history that includes Cardiac surgery; other surgical history (2018); hip surgery; Coronary angioplasty with stent (); pr egd transoral biopsy single/multiple (N/A, 2018); Colonoscopy (N/A, 2018); Appendectomy; pr dstrj lesion anus simple surg excision (N/A, 10/9/2018); pr colonoscopy flx dx w/collj spec when pfrmd (N/A, 10/9/2018); Colonoscopy (N/A, 2019); Knee arthroscopy (Bilateral); and Colonoscopy (N/A, 3/5/2020). Restrictions  Restrictions/Precautions: Up as Tolerated, Fall Risk, Modified Diet  Other position/activity restrictions: up with  assistance    Subjective  Subjective: Pt semi supine in bed asleep upon entry. Easily arousible. Denied pain and agreeable to tx. Co-tx indicated in order to maximize therapeutic potential.  Restrictions/Precautions: Up as Tolerated; Fall Risk;Modified Diet             Objective     Cognition  Arousal/Alertness: Inconsistent responses to stimuli;Delayed responses to stimuli  Following Commands: Inconsistently follows commands; Follows one step commands with repetition; Follows one step commands with increased time  Attention Span: Difficulty dividing attention; Difficulty attending to directions; Unable to maintain attention  Memory: Decreased place;Nurse notified    Patient Education  Education  Education Given To: Patient  Education Provided: Role of Therapy; ADL Function; Family Education;Mobility Training;Transfer Training; Safety; Energy Conservation; Fall Prevention Strategies; Plan of Care  Education Method: Demonstration;Verbal  Barriers to Learning: Cognition  Education Outcome: Verbalized understanding;Continued education needed    Plan  Occupational Therapy Plan  Times Per Week: 5x a week, 60 mins daily  Current Treatment Recommendations: Balance training;ROM;Strengthening; Functional mobility training; Endurance training; Safety education & training;Self-Care / ADL;Positioning;Neuromuscular re-education;Cognitive reorientation;Patient/Caregiver education & training;Cognitive/Perceptual training    Goals  Patient Goals   Patient goals : To get stronger. Short Term Goals  Time Frame for Short Term Goals: 14 days  Short Term Goal 1: Pt will complete LE dressing w/ max A x1. - continue for consistency  Short Term Goal 2: Pt will complete toileting w/ max A x1. Short Term Goal 3: Pt complete UE dressing w/ max A x1. Short Term Goal 4: Pt will complete oral care w/ mod A. - goal met 9/14  Short Term Goal 5: Pt will complete bathing w/ max A x1. Long Term Goals  Time Frame for Long Term Goals : 21 days  Long Term Goal 1: Pt will complete LE dressing w/ mod A x.1  Long Term Goal 2: Pt will complete toileting w/ mod A x1. Long Term Goal 3: Pt will complete UE dressing w/ CGA. Long Term Goal 4: Pt will complete oral care w/ SPV. Long Term Goal 5: Pt will complete bathing w/ mod A x1.                 Therapy Time   Individual Concurrent Group Co-treatment   Time In       0730   Time Out       0830   Minutes       60       Timed Code Treatment Minutes:  60 min     Total Treatment Minutes: 60 min     Thornell Libman, OT

## 2023-09-14 NOTE — CARE COORDINATION
SW met with patient at bedside to provide updates and give anticipated discharge date of 9/29. Patient was agreeable with anticipated discharge date and has no more questions at this time. SW provided spouse with SNF list to look over as spouse noted that there is no assistance at home and is anticipating patient will need SNF discharge. SW following.       Electronically signed by MILADY Bullock on 9/14/2023 at 6:28 PM

## 2023-09-14 NOTE — CONSULTS
Pt is alert and oriented to himself, does not know the place, thinks it's October. He does not seem to have good insight into his medical problems. I called his wife [employer]. Introduced palliative care and had a voluntary discussion about advance care planning. She feels he has declined in the past couple weeks rather than recovering from his stroke. We discussed code status and she says he would not want efforts at resuscitation at this point. Discussed intubation and she said we should transition to comfort care if he gets to the point of needing to be intubated. Changed code status to DNR/DNI (Limited- no to all interventions) per wife's wishes. We also discussed where the pt would be cared for in the future. She's concerned that he won't become independent again, and says she won't be able to take care of him at home. She is still working and needs her income. She doesn't want him to be in a nursing facility. Briefly discussed hospice and she would be open to it, however the location of care would still be an issue if he is stable but debilitated. Will continue to discuss with pt/wife as needed.     Real Sadler, [place]  [address] [facility] Pt presented to the ED via EMS from home due to unable to urinate. Pt mentions that he had a catheter in place for about a week and was removed today around 1500. Pt states that he has not urinated since then and was instructed to call the ambulance if he didn't urinate. Pt denies any abd pain or discomfort at this time.

## 2023-09-14 NOTE — PROGRESS NOTES
ACUTE REHAB UNIT  SPEECH/LANGUAGE PATHOLOGY      [x] Daily  [x] Weekly Care Conference Note  [] Discharge    Patient:Grady Rojas      VJC:5/9/[birth year]  AEZ:[id number]  Rehab Dx/Hx: Acute CVA (cerebrovascular accident) ([address]) [I63.9]    Precautions: [] Aspiration  [x] Fall risk  [] Sternal  [] Seizure [] Hip  [] Weight Bearing [x] Other: delirium     ST Dx: [] Aphasia  [] Dysarthria  [] Apraxia   [x] Oropharyngeal dysphagia [x] Cognitive Impairment  [] Other:   Date of Admit: 9/8/2023  Room #: 3109/3109-01  Date: 9/14/2023          Current Diet Order:ADULT DIET; Dysphagia - Soft and Bite Sized, Thin liquids with single sip and double hard swallow  ADULT ORAL NUTRITION SUPPLEMENT; Breakfast, Lunch, Dinner; Standard High Calorie/High Protein Oral Supplement   Recommended Form of Meds: PO  Compensatory Swallowing Strategies : Alternate solids and liquids, Remain upright for 30-45 minutes after meals, Upright as possible for all oral intake, Small bites/sips, Lingual sweep (Single sips thin liquids, double hard swallow for liquids), single sip, double hard swallows  Lives With: Spouse        Occupation: Retired  Type of Occupation:  for [employer].        Dentition: Adequate  Vision  Vision: Impaired  Vision Exceptions: Wears glasses for reading, Visual field cut  Hearing  Hearing: Within functional limits  Barriers toward progress: Limited insight into deficits        Date: 9/14/2023        Tx session 1 Tx session 2 Tx session 3 Weekly Summary   Total Timed Code Min 15 25 30    Total Treatment Minutes 35 25 30    Individual Treatment Minutes 35 25 30    Group Treatment Minutes 0 0 0    Co-Treat Minutes 0 0 0    Brief Exception: N/A N/A N/A    Pain None indicated Headache 10/10 None indicated     Pain Intervention: [] RN notified  [] Repositioned  [] Intervention offered and patient declined  [x] N/A  [] Other: [x] RN notified  [] Repositioned  [] Intervention offered and patient declined  [] N/A  [x] Other:

## 2023-09-15 LAB
ANION GAP SERPL CALCULATED.3IONS-SCNC: 13 MMOL/L (ref 3–16)
BUN SERPL-MCNC: 26 MG/DL (ref 7–20)
CALCIUM SERPL-MCNC: 10.2 MG/DL (ref 8.3–10.6)
CHLORIDE SERPL-SCNC: 106 MMOL/L (ref 99–110)
CO2 SERPL-SCNC: 26 MMOL/L (ref 21–32)
CREAT SERPL-MCNC: 0.6 MG/DL (ref 0.8–1.3)
DEPRECATED RDW RBC AUTO: 12.6 % (ref 12.4–15.4)
GFR SERPLBLD CREATININE-BSD FMLA CKD-EPI: >60 ML/MIN/{1.73_M2}
GLUCOSE SERPL-MCNC: 156 MG/DL (ref 70–99)
HCT VFR BLD AUTO: 40.3 % (ref 40.5–52.5)
HGB BLD-MCNC: 14.4 G/DL (ref 13.5–17.5)
MCH RBC QN AUTO: 31.4 PG (ref 26–34)
MCHC RBC AUTO-ENTMCNC: 35.7 G/DL (ref 31–36)
MCV RBC AUTO: 87.7 FL (ref 80–100)
PLATELET # BLD AUTO: 226 K/UL (ref 135–450)
PMV BLD AUTO: 8.9 FL (ref 5–10.5)
POTASSIUM SERPL-SCNC: 4 MMOL/L (ref 3.5–5.1)
RBC # BLD AUTO: 4.59 M/UL (ref 4.2–5.9)
SODIUM SERPL-SCNC: 145 MMOL/L (ref 136–145)
WBC # BLD AUTO: 8.4 K/UL (ref 4–11)

## 2023-09-15 PROCEDURE — 92526 ORAL FUNCTION THERAPY: CPT

## 2023-09-15 PROCEDURE — 85027 COMPLETE CBC AUTOMATED: CPT

## 2023-09-15 PROCEDURE — 36415 COLL VENOUS BLD VENIPUNCTURE: CPT

## 2023-09-15 PROCEDURE — 97112 NEUROMUSCULAR REEDUCATION: CPT

## 2023-09-15 PROCEDURE — 97535 SELF CARE MNGMENT TRAINING: CPT

## 2023-09-15 PROCEDURE — 97129 THER IVNTJ 1ST 15 MIN: CPT

## 2023-09-15 PROCEDURE — 97530 THERAPEUTIC ACTIVITIES: CPT

## 2023-09-15 PROCEDURE — 6370000000 HC RX 637 (ALT 250 FOR IP): Performed by: PHYSICAL MEDICINE & REHABILITATION

## 2023-09-15 PROCEDURE — 80048 BASIC METABOLIC PNL TOTAL CA: CPT

## 2023-09-15 PROCEDURE — 6370000000 HC RX 637 (ALT 250 FOR IP): Performed by: STUDENT IN AN ORGANIZED HEALTH CARE EDUCATION/TRAINING PROGRAM

## 2023-09-15 PROCEDURE — 1280000000 HC REHAB R&B

## 2023-09-15 PROCEDURE — 97130 THER IVNTJ EA ADDL 15 MIN: CPT

## 2023-09-15 PROCEDURE — 6370000000 HC RX 637 (ALT 250 FOR IP): Performed by: INTERNAL MEDICINE

## 2023-09-15 RX ADMIN — NYSTATIN 500000 UNITS: 100000 SUSPENSION ORAL at 12:59

## 2023-09-15 RX ADMIN — EZETIMIBE 10 MG: 10 TABLET ORAL at 22:52

## 2023-09-15 RX ADMIN — NYSTATIN 500000 UNITS: 100000 SUSPENSION ORAL at 22:58

## 2023-09-15 RX ADMIN — FLUOXETINE 10 MG: 10 CAPSULE ORAL at 10:13

## 2023-09-15 RX ADMIN — OXYCODONE HYDROCHLORIDE 5 MG: 5 TABLET ORAL at 10:48

## 2023-09-15 RX ADMIN — Medication 100 MG: at 10:13

## 2023-09-15 RX ADMIN — OXYCODONE HYDROCHLORIDE 5 MG: 5 TABLET ORAL at 16:23

## 2023-09-15 RX ADMIN — TAMSULOSIN HYDROCHLORIDE 0.4 MG: 0.4 CAPSULE ORAL at 10:13

## 2023-09-15 RX ADMIN — Medication 5 MG: at 22:59

## 2023-09-15 RX ADMIN — CETIRIZINE HYDROCHLORIDE 10 MG: 10 TABLET, FILM COATED ORAL at 22:53

## 2023-09-15 RX ADMIN — MONTELUKAST 10 MG: 10 TABLET, FILM COATED ORAL at 22:55

## 2023-09-15 RX ADMIN — NYSTATIN 500000 UNITS: 100000 SUSPENSION ORAL at 20:04

## 2023-09-15 RX ADMIN — NYSTATIN 500000 UNITS: 100000 SUSPENSION ORAL at 10:13

## 2023-09-15 RX ADMIN — VERAPAMIL HYDROCHLORIDE 180 MG: 240 TABLET, FILM COATED, EXTENDED RELEASE ORAL at 23:09

## 2023-09-15 ASSESSMENT — PAIN SCALES - GENERAL
PAINLEVEL_OUTOF10: 7
PAINLEVEL_OUTOF10: 0
PAINLEVEL_OUTOF10: 0

## 2023-09-15 ASSESSMENT — PAIN - FUNCTIONAL ASSESSMENT
PAIN_FUNCTIONAL_ASSESSMENT: PREVENTS OR INTERFERES SOME ACTIVE ACTIVITIES AND ADLS
PAIN_FUNCTIONAL_ASSESSMENT: PREVENTS OR INTERFERES SOME ACTIVE ACTIVITIES AND ADLS

## 2023-09-15 ASSESSMENT — PAIN DESCRIPTION - DESCRIPTORS
DESCRIPTORS: ACHING
DESCRIPTORS: PATIENT UNABLE TO DESCRIBE

## 2023-09-15 ASSESSMENT — PAIN DESCRIPTION - LOCATION
LOCATION: HEAD
LOCATION: BACK

## 2023-09-15 ASSESSMENT — PAIN DESCRIPTION - ORIENTATION
ORIENTATION: OTHER (COMMENT)
ORIENTATION: LOWER;LEFT

## 2023-09-15 ASSESSMENT — PAIN DESCRIPTION - ONSET
ONSET: ON-GOING
ONSET: UNABLE TO COMMUNICATE

## 2023-09-15 ASSESSMENT — PAIN DESCRIPTION - PAIN TYPE
TYPE: ACUTE PAIN
TYPE: ACUTE PAIN

## 2023-09-15 ASSESSMENT — PAIN DESCRIPTION - FREQUENCY
FREQUENCY: INTERMITTENT
FREQUENCY: INTERMITTENT

## 2023-09-15 NOTE — PROGRESS NOTES
ACUTE REHAB UNIT  SPEECH/LANGUAGE PATHOLOGY      [x] Daily  [] Weekly Care Conference Note  [] Discharge    Patient:Grady Carvajal      GNB:4/1/0204  NJT:[id number]  Rehab Dx/Hx: Acute CVA (cerebrovascular accident) ([address]) [I63.9]    Precautions: [] Aspiration  [x] Fall risk  [] Sternal  [] Seizure [] Hip  [] Weight Bearing [x] Other: delirium     ST Dx: [] Aphasia  [] Dysarthria  [] Apraxia   [x] Oropharyngeal dysphagia [x] Cognitive Impairment  [] Other:   Date of Admit: 9/8/2023  Room #: 3109/3109-01  Date: 9/15/2023          Current Diet Order:ADULT DIET; Dysphagia - Soft and Bite Sized, Thin liquids with single sip and double hard swallow  ADULT ORAL NUTRITION SUPPLEMENT; Breakfast, Lunch, Dinner; Standard High Calorie/High Protein Oral Supplement   Recommended Form of Meds: PO  Compensatory Swallowing Strategies : Alternate solids and liquids, Remain upright for 30-45 minutes after meals, Upright as possible for all oral intake, Small bites/sips, Lingual sweep (Single sips thin liquids, double hard swallow for liquids), single sip, double hard swallows  Lives With: Spouse        Occupation: Retired  Type of Occupation:  for [employer].        Dentition: Adequate  Vision  Vision: Impaired  Vision Exceptions: Wears glasses for reading, Visual field cut  Hearing  Hearing: Within functional limits  Barriers toward progress: Limited insight into deficits        Date: 9/15/2023       Tx session 1 Tx session 2 Tx session 3   Total Timed Code Min 15 30 15   Total Treatment Minutes 30 30 15   Individual Treatment Minutes 30 30 15   Group Treatment Minutes 0 0 0   Co-Treat Minutes 0 0 0   Brief Exception: N/A N/A N/A   Pain None indicated None indicated  None indicated    Pain Intervention: [] RN notified  [] Repositioned  [] Intervention offered and patient declined  [x] N/A  [] Other: [] RN notified  [] Repositioned  [] Intervention offered and patient declined  [x] N/A  [] Other:  [] RN notified  [] established as appropriate. Goal not targeted this session. Not targeted this session. Attempted to complete counting money task: pt with no organization or working memory of items counted; impulsive requiring mod to MAX cues for self-inhibition. Other areas targeted:      Education:   Educated to rationale for tx session and skills targeted. Educated re: goals targeted this session, strategies for scanning to the L.  Educated to rationale for tx session and skills targeted. Safety Devices: [x] Call light within reach  [x] Chair alarm activated and connected to nurse call light system  [] Bed alarm activated   [x] Other: when presented with call light, pt able to ID correct button for RN. [x] Call light within reach  [x] Chair alarm activated and connected to nurse call light system  [] Bed alarm activated  [] Other:  [x] Call light within reach  [x] Chair alarm activated and connected to nurse call light system  [] Bed alarm activated  [x] Other: RN at side   Assessment: Moderate-severe R MCA syndrome. Severe inattention with suspect large behavioral component. Oral phase dysphagia with negative feeding behaviors. 1:1 supervision for meals for PO intake and swallow strategies. Plan: Continue as per plan of care.        Interventions used this date:  [] Speech/Language Treatment  [] Instruction in HEP  [x] Dysphagia Treatment [x] Cognitive Treatment   [] Other:    Discharge recommendations:  [] Home independently  [] Home with assistance [x]  24 hour supervision  [] ECF [] Other  Continued Tx Upon Discharge: ? [x] Yes    [] No    [] TBD based on progress while on ARU     [] Vital Stim indicated     [] Other:   Estimated discharge date: 9/29/23      Electronically signed by:  Session 1 & 3:  Jane Markham, [address] [place] [address]  Speech-Language Pathologist        Session 2:  Precious Dow  Speech-Language Pathologist

## 2023-09-15 NOTE — PROGRESS NOTES
Pt restless and was assisted back to bed with X2 staff assistance with a nate lift. Physical assessment and vital signs as charted. Pt currently rates his lower back pain as a 7 out of 10 on the pain scale, 5 mg of PRN oxycodone was administered to the Pt at 1048. Bed alarm engaged. Call light placed within reach. RN will continue to monitor Pt.

## 2023-09-15 NOTE — PROGRESS NOTES
Occupational Therapy  Facility/Department: [facility] ACUTE REHAB UNIT  Rehabilitation Occupational Therapy Daily Treatment Note    Date: 9/15/23  Patient Name: Bruna Alves       Room: 3109/3109-01  MRN: [id number]  Account: [de-identified]   : 1950  (74 y.o.) Gender: male                    Past Medical History:  has a past medical history of Angioedema, ED (erectile dysfunction), History of colon polyps, History of Aj-Barr virus infection, History of rectal polyps, Idiopathic urticaria, MI (myocardial infarction) ([address]), Osteoarthritis, RA (rheumatoid arthritis) ([address]), Tear of medial cartilage or meniscus of knee, current, and Unspecified essential hypertension. Past Surgical History:   has a past surgical history that includes Cardiac surgery; other surgical history (2018); hip surgery; Coronary angioplasty with stent (); pr egd transoral biopsy single/multiple (N/A, 2018); Colonoscopy (N/A, 2018); Appendectomy; pr dstrj lesion anus simple surg excision (N/A, 10/9/2018); pr colonoscopy flx dx w/collj spec when pfrmd (N/A, 10/9/2018); Colonoscopy (N/A, 2019); Knee arthroscopy (Bilateral); and Colonoscopy (N/A, 3/5/2020). Restrictions  Restrictions/Precautions: Up as Tolerated, Fall Risk, Modified Diet  Other position/activity restrictions: up with  assistance    Subjective  Subjective: Pt semi supine in bed asleep upon entry. Easily arousible. Denied pain and agreeable to tx. Co-tx indicated in order to maximize therapeutic potential. Daughter Valentine Yun initially present. Restrictions/Precautions: Up as Tolerated; Fall Risk;Modified Diet             Objective     Cognition  Arousal/Alertness: Inconsistent responses to stimuli;Delayed responses to stimuli  Following Commands: Inconsistently follows commands; Follows one step commands with repetition; Follows one step commands with increased time  Attention Span: Difficulty dividing attention; Difficulty attending to directions; Unable to

## 2023-09-15 NOTE — PROGRESS NOTES
EOM, PT on ball to hold pt upright and seated next to the wall on the right to maintain midline with mirror. Pt requiring Max- dependent assistance and constant cues for upright)  Transfers  Surface: From bed; Wheelchair  Additional Factors: Set-up; Verbal cues; Hand placement cues; Increased time to complete  Device: Lift equipment (AISHA)        PT Exercises  Exercise Treatment: Pt seated on the EOM, performed reaching task on the right side of the wall in order to decrease his pusher syndrome and improve his midline posture. Pt continues to ask for a hair cut and one was given via OT, with PT assisting with sitting balance ~25-30 minutes. ASSESSMENT/PROGRESS TOWARDS GOALS       Assessment  Assessment: Pt tolerated more therapy this date and was mainly focused on sitting balance. Pt has signififcant pusher's syndrome and was placed next to the wall on the right side. He required a mirror for visual feedback but had a difficult time with command following. Pt does tend to \"fall asleep\" during the session. He would benefit from continued ip therapy in order to improve his functional mobility, transfers, wheelchair mobility, safety and independence. Pt has no assistance at home and is a significantly high fall risk. Activity Tolerance: Patient tolerated treatment well  Discharge Recommendations: Continue to assess pending progress;Subacute/Skilled Nursing Facility; Long Term Care with PT  PT Equipment Recommendations  Other: Ongoing assessment at this time    Goals  Patient Goals   Patient Goals : \"get the hell out of here and out of therapy\"  Short Term Goals  Time Frame for Short Term Goals: 14 days( all goals are ongoing)  Short Term Goal 1: Pt will be able to perform bed mobility with max assist of 1 person  Short Term Goal 2: Pt will be able to sit EOB with proper midline positioning for 5 minutes with min A  Short Term Goal 3: Pt will be able to perform a sit pivot to the R via max A  Short Term Goal 4: Pt

## 2023-09-16 PROCEDURE — 1280000000 HC REHAB R&B

## 2023-09-16 PROCEDURE — 6370000000 HC RX 637 (ALT 250 FOR IP): Performed by: STUDENT IN AN ORGANIZED HEALTH CARE EDUCATION/TRAINING PROGRAM

## 2023-09-16 PROCEDURE — 6370000000 HC RX 637 (ALT 250 FOR IP): Performed by: PHYSICAL MEDICINE & REHABILITATION

## 2023-09-16 PROCEDURE — 6370000000 HC RX 637 (ALT 250 FOR IP): Performed by: INTERNAL MEDICINE

## 2023-09-16 RX ADMIN — NYSTATIN 500000 UNITS: 100000 SUSPENSION ORAL at 17:43

## 2023-09-16 RX ADMIN — OXYCODONE HYDROCHLORIDE 5 MG: 5 TABLET ORAL at 20:03

## 2023-09-16 RX ADMIN — NYSTATIN 500000 UNITS: 100000 SUSPENSION ORAL at 08:39

## 2023-09-16 RX ADMIN — Medication 100 MG: at 08:40

## 2023-09-16 RX ADMIN — VERAPAMIL HYDROCHLORIDE 180 MG: 240 TABLET, FILM COATED, EXTENDED RELEASE ORAL at 20:12

## 2023-09-16 RX ADMIN — NYSTATIN 500000 UNITS: 100000 SUSPENSION ORAL at 13:57

## 2023-09-16 RX ADMIN — ACETAMINOPHEN 650 MG: 325 TABLET ORAL at 17:42

## 2023-09-16 RX ADMIN — NYSTATIN 500000 UNITS: 100000 SUSPENSION ORAL at 20:07

## 2023-09-16 RX ADMIN — FLUOXETINE 10 MG: 10 CAPSULE ORAL at 08:40

## 2023-09-16 RX ADMIN — Medication 5 MG: at 20:07

## 2023-09-16 RX ADMIN — TAMSULOSIN HYDROCHLORIDE 0.4 MG: 0.4 CAPSULE ORAL at 08:40

## 2023-09-16 RX ADMIN — CETIRIZINE HYDROCHLORIDE 10 MG: 10 TABLET, FILM COATED ORAL at 20:04

## 2023-09-16 RX ADMIN — EZETIMIBE 10 MG: 10 TABLET ORAL at 20:02

## 2023-09-16 RX ADMIN — MONTELUKAST 10 MG: 10 TABLET, FILM COATED ORAL at 20:06

## 2023-09-16 ASSESSMENT — PAIN DESCRIPTION - DESCRIPTORS
DESCRIPTORS: ACHING;THROBBING
DESCRIPTORS: ACHING;THROBBING

## 2023-09-16 ASSESSMENT — PAIN DESCRIPTION - ORIENTATION
ORIENTATION: UPPER
ORIENTATION: RIGHT

## 2023-09-16 ASSESSMENT — PAIN SCALES - GENERAL
PAINLEVEL_OUTOF10: 4
PAINLEVEL_OUTOF10: 7
PAINLEVEL_OUTOF10: 10

## 2023-09-16 ASSESSMENT — PAIN DESCRIPTION - PAIN TYPE
TYPE: ACUTE PAIN
TYPE: ACUTE PAIN

## 2023-09-16 ASSESSMENT — PAIN DESCRIPTION - FREQUENCY
FREQUENCY: INTERMITTENT
FREQUENCY: INTERMITTENT

## 2023-09-16 ASSESSMENT — PAIN DESCRIPTION - LOCATION
LOCATION: HEAD
LOCATION: HEAD

## 2023-09-16 ASSESSMENT — PAIN DESCRIPTION - ONSET: ONSET: ON-GOING

## 2023-09-16 NOTE — PLAN OF CARE
Problem: Discharge Planning  Goal: Discharge to home or other facility with appropriate resources  9/16/2023 1341 by Bbo Powell RN  Flowsheets (Taken 9/16/2023 1341)  Discharge to home or other facility with appropriate resources: Identify barriers to discharge with patient and caregiver     Problem: Skin/Tissue Integrity  Goal: Absence of new skin breakdown  Description: 1. Monitor for areas of redness and/or skin breakdown  2. Assess vascular access sites hourly  3. Every 4-6 hours minimum:  Change oxygen saturation probe site  4. Every 4-6 hours:  If on nasal continuous positive airway pressure, respiratory therapy assess nares and determine need for appliance change or resting period.   9/16/2023 1340 by Bob Powell RN  Outcome: Progressing

## 2023-09-16 NOTE — PLAN OF CARE
Problem: Discharge Planning  Goal: Discharge to home or other facility with appropriate resources  Outcome: Progressing     Problem: Safety - Adult  Goal: Free from fall injury  Outcome: Progressing     Problem: Skin/Tissue Integrity  Goal: Absence of new skin breakdown  Description: 1. Monitor for areas of redness and/or skin breakdown  2. Assess vascular access sites hourly  3. Every 4-6 hours minimum:  Change oxygen saturation probe site  4. Every 4-6 hours:  If on nasal continuous positive airway pressure, respiratory therapy assess nares and determine need for appliance change or resting period.   Outcome: Progressing     Problem: Pain  Goal: Verbalizes/displays adequate comfort level or baseline comfort level  Outcome: Progressing  Flowsheets (Taken 9/15/2023 0329)  Verbalizes/displays adequate comfort level or baseline comfort level:   Encourage patient to monitor pain and request assistance   Assess pain using appropriate pain scale   Administer analgesics based on type and severity of pain and evaluate response   Implement non-pharmacological measures as appropriate and evaluate response

## 2023-09-16 NOTE — PROGRESS NOTES
Pt in bed at this time. Alert to self and place. Pt able to voice he was in a rehab facility this AM. Denies any c/o pain. Safety measures in place. Call light in reach.

## 2023-09-17 VITALS
TEMPERATURE: 97.7 F | HEART RATE: 98 BPM | RESPIRATION RATE: 16 BRPM | WEIGHT: 182.8 LBS | SYSTOLIC BLOOD PRESSURE: 160 MMHG | HEIGHT: 71 IN | DIASTOLIC BLOOD PRESSURE: 88 MMHG | BODY MASS INDEX: 25.59 KG/M2 | OXYGEN SATURATION: 98 %

## 2023-09-17 PROCEDURE — 6370000000 HC RX 637 (ALT 250 FOR IP): Performed by: STUDENT IN AN ORGANIZED HEALTH CARE EDUCATION/TRAINING PROGRAM

## 2023-09-17 PROCEDURE — 1280000000 HC REHAB R&B

## 2023-09-17 PROCEDURE — 6370000000 HC RX 637 (ALT 250 FOR IP): Performed by: INTERNAL MEDICINE

## 2023-09-17 PROCEDURE — 6370000000 HC RX 637 (ALT 250 FOR IP): Performed by: PHYSICAL MEDICINE & REHABILITATION

## 2023-09-17 RX ADMIN — FLUOXETINE 10 MG: 10 CAPSULE ORAL at 08:17

## 2023-09-17 RX ADMIN — Medication 100 MG: at 08:17

## 2023-09-17 RX ADMIN — MONTELUKAST 10 MG: 10 TABLET, FILM COATED ORAL at 19:56

## 2023-09-17 RX ADMIN — NYSTATIN 500000 UNITS: 100000 SUSPENSION ORAL at 19:56

## 2023-09-17 RX ADMIN — TAMSULOSIN HYDROCHLORIDE 0.4 MG: 0.4 CAPSULE ORAL at 08:17

## 2023-09-17 RX ADMIN — NYSTATIN 500000 UNITS: 100000 SUSPENSION ORAL at 08:27

## 2023-09-17 RX ADMIN — VERAPAMIL HYDROCHLORIDE 180 MG: 240 TABLET, FILM COATED, EXTENDED RELEASE ORAL at 19:56

## 2023-09-17 RX ADMIN — NYSTATIN 500000 UNITS: 100000 SUSPENSION ORAL at 17:36

## 2023-09-17 RX ADMIN — NYSTATIN 500000 UNITS: 100000 SUSPENSION ORAL at 13:19

## 2023-09-17 RX ADMIN — EZETIMIBE 10 MG: 10 TABLET ORAL at 19:56

## 2023-09-17 RX ADMIN — CETIRIZINE HYDROCHLORIDE 10 MG: 10 TABLET, FILM COATED ORAL at 19:56

## 2023-09-17 RX ADMIN — Medication 5 MG: at 19:56

## 2023-09-17 ASSESSMENT — PAIN SCALES - GENERAL
PAINLEVEL_OUTOF10: 0

## 2023-09-17 NOTE — PROGRESS NOTES
Pt in bed at this time. Alert to person only. Fco any c/o of headache or pain at this time. Pt continues to be incontinent this shift. Safety measures in place. Call light in reach. Denies any needs at this time.

## 2023-09-17 NOTE — PLAN OF CARE
Problem: Discharge Planning  Goal: Discharge to home or other facility with appropriate resources  9/16/2023 2312 by Ady Marlow RN  Outcome: Progressing   Will identify barriers to discharge with patient and caregiver; Identify discharge learning needs (medications, wound care, etc.)       Problem: Safety - Adult  Goal: Free from fall injury  Outcome: Progressing   Pt remains free from falls during this stay on the ARU. 1:1 and education provided on the importance of using call light to ask for assistance prior to transfers and ambulation. Pt voices understanding. Will continue to monitor and re-educate as needed. Problem: Skin/Tissue Integrity  Goal: Absence of new skin breakdown  Description: 1. Monitor for areas of redness and/or skin breakdown  2. Assess vascular access sites hourly  3. Every 4-6 hours minimum:  Change oxygen saturation probe site  4. Every 4-6 hours:  If on nasal continuous positive airway pressure, respiratory therapy assess nares and determine need for appliance change or resting period. 9/16/2023 2312 by Ady Marlow RN  Outcome: Progressing   Pt shows no new signs of skin breakdown this shift. Pt repositioned frequently in bed Q2 hours; extremities elevated on cushions; will continue to monitor.       Problem: Pain  Goal: Verbalizes/displays adequate comfort level or baseline comfort level  Outcome: Progressing  Flowsheets (Taken 9/16/2023 1947)  Verbalizes/displays adequate comfort level or baseline comfort level:   Encourage patient to monitor pain and request assistance   Assess pain using appropriate pain scale   Administer analgesics based on type and severity of pain and evaluate response   Implement non-pharmacological measures as appropriate and evaluate response

## 2023-09-17 NOTE — PLAN OF CARE
Problem: Safety - Adult  Goal: Free from fall injury  9/17/2023 0930 by Constantine Smith RN  Outcome: Progressing  Flowsheets (Taken 9/17/2023 0930)  Free From Fall Injury: Instruct family/caregiver on patient safety     Problem: Skin/Tissue Integrity  Goal: Absence of new skin breakdown  Description: 1. Monitor for areas of redness and/or skin breakdown  2. Assess vascular access sites hourly  3. Every 4-6 hours minimum:  Change oxygen saturation probe site  4. Every 4-6 hours:  If on nasal continuous positive airway pressure, respiratory therapy assess nares and determine need for appliance change or resting period.   9/17/2023 0930 by Constantine Smith RN  Outcome: Progressing     Problem: ABCDS Injury Assessment  Goal: Absence of physical injury  Outcome: Progressing  Flowsheets (Taken 9/17/2023 0930)  Absence of Physical Injury: Implement safety measures based on patient assessment     Problem: Pain  Goal: Verbalizes/displays adequate comfort level or baseline comfort level  9/17/2023 0930 by Constantine Smith RN  Outcome: Progressing  Flowsheets (Taken 9/17/2023 0930)  Verbalizes/displays adequate comfort level or baseline comfort level:   Encourage patient to monitor pain and request assistance   Assess pain using appropriate pain scale   Administer analgesics based on type and severity of pain and evaluate response   Implement non-pharmacological measures as appropriate and evaluate response

## 2023-09-18 LAB
ANION GAP SERPL CALCULATED.3IONS-SCNC: 15 MMOL/L (ref 3–16)
BUN SERPL-MCNC: 23 MG/DL (ref 7–20)
CALCIUM SERPL-MCNC: 10.1 MG/DL (ref 8.3–10.6)
CHLORIDE SERPL-SCNC: 102 MMOL/L (ref 99–110)
CO2 SERPL-SCNC: 26 MMOL/L (ref 21–32)
CREAT SERPL-MCNC: 0.6 MG/DL (ref 0.8–1.3)
DEPRECATED RDW RBC AUTO: 12.4 % (ref 12.4–15.4)
GFR SERPLBLD CREATININE-BSD FMLA CKD-EPI: >60 ML/MIN/{1.73_M2}
GLUCOSE SERPL-MCNC: 192 MG/DL (ref 70–99)
HCT VFR BLD AUTO: 41 % (ref 40.5–52.5)
HGB BLD-MCNC: 14.3 G/DL (ref 13.5–17.5)
MAGNESIUM SERPL-MCNC: 2.2 MG/DL (ref 1.8–2.4)
MCH RBC QN AUTO: 30.6 PG (ref 26–34)
MCHC RBC AUTO-ENTMCNC: 34.9 G/DL (ref 31–36)
MCV RBC AUTO: 87.8 FL (ref 80–100)
PLATELET # BLD AUTO: 246 K/UL (ref 135–450)
PMV BLD AUTO: 9.1 FL (ref 5–10.5)
POTASSIUM SERPL-SCNC: 3.4 MMOL/L (ref 3.5–5.1)
RBC # BLD AUTO: 4.68 M/UL (ref 4.2–5.9)
SODIUM SERPL-SCNC: 143 MMOL/L (ref 136–145)
WBC # BLD AUTO: 8.6 K/UL (ref 4–11)

## 2023-09-18 PROCEDURE — 97129 THER IVNTJ 1ST 15 MIN: CPT

## 2023-09-18 PROCEDURE — 97032 APPL MODALITY 1+ESTIM EA 15: CPT

## 2023-09-18 PROCEDURE — 80048 BASIC METABOLIC PNL TOTAL CA: CPT

## 2023-09-18 PROCEDURE — 97110 THERAPEUTIC EXERCISES: CPT

## 2023-09-18 PROCEDURE — 1280000000 HC REHAB R&B

## 2023-09-18 PROCEDURE — 92526 ORAL FUNCTION THERAPY: CPT

## 2023-09-18 PROCEDURE — 83735 ASSAY OF MAGNESIUM: CPT

## 2023-09-18 PROCEDURE — 6370000000 HC RX 637 (ALT 250 FOR IP): Performed by: STUDENT IN AN ORGANIZED HEALTH CARE EDUCATION/TRAINING PROGRAM

## 2023-09-18 PROCEDURE — 36415 COLL VENOUS BLD VENIPUNCTURE: CPT

## 2023-09-18 PROCEDURE — 85027 COMPLETE CBC AUTOMATED: CPT

## 2023-09-18 PROCEDURE — 6370000000 HC RX 637 (ALT 250 FOR IP): Performed by: INTERNAL MEDICINE

## 2023-09-18 PROCEDURE — 97112 NEUROMUSCULAR REEDUCATION: CPT

## 2023-09-18 PROCEDURE — 97530 THERAPEUTIC ACTIVITIES: CPT

## 2023-09-18 PROCEDURE — 97130 THER IVNTJ EA ADDL 15 MIN: CPT

## 2023-09-18 PROCEDURE — 97535 SELF CARE MNGMENT TRAINING: CPT

## 2023-09-18 PROCEDURE — 6370000000 HC RX 637 (ALT 250 FOR IP): Performed by: PHYSICAL MEDICINE & REHABILITATION

## 2023-09-18 RX ORDER — OXYCODONE HYDROCHLORIDE 5 MG/1
5 TABLET ORAL EVERY 6 HOURS PRN
Status: DISCONTINUED | OUTPATIENT
Start: 2023-09-18 | End: 2023-09-29 | Stop reason: HOSPADM

## 2023-09-18 RX ORDER — METHYLPHENIDATE HYDROCHLORIDE 10 MG/1
5 TABLET ORAL EVERY MORNING
Status: DISCONTINUED | OUTPATIENT
Start: 2023-09-18 | End: 2023-09-29 | Stop reason: HOSPADM

## 2023-09-18 RX ADMIN — NYSTATIN 500000 UNITS: 100000 SUSPENSION ORAL at 12:23

## 2023-09-18 RX ADMIN — MONTELUKAST 10 MG: 10 TABLET, FILM COATED ORAL at 21:13

## 2023-09-18 RX ADMIN — NYSTATIN 500000 UNITS: 100000 SUSPENSION ORAL at 21:13

## 2023-09-18 RX ADMIN — VERAPAMIL HYDROCHLORIDE 180 MG: 240 TABLET, FILM COATED, EXTENDED RELEASE ORAL at 21:19

## 2023-09-18 RX ADMIN — Medication 100 MG: at 08:57

## 2023-09-18 RX ADMIN — METHYLPHENIDATE HYDROCHLORIDE 5 MG: 10 TABLET ORAL at 12:23

## 2023-09-18 RX ADMIN — NYSTATIN 500000 UNITS: 100000 SUSPENSION ORAL at 08:57

## 2023-09-18 RX ADMIN — TAMSULOSIN HYDROCHLORIDE 0.4 MG: 0.4 CAPSULE ORAL at 08:57

## 2023-09-18 RX ADMIN — EZETIMIBE 10 MG: 10 TABLET ORAL at 21:13

## 2023-09-18 RX ADMIN — POTASSIUM BICARBONATE 20 MEQ: 782 TABLET, EFFERVESCENT ORAL at 12:23

## 2023-09-18 RX ADMIN — CETIRIZINE HYDROCHLORIDE 10 MG: 10 TABLET, FILM COATED ORAL at 21:13

## 2023-09-18 RX ADMIN — NYSTATIN 500000 UNITS: 100000 SUSPENSION ORAL at 18:26

## 2023-09-18 RX ADMIN — Medication 5 MG: at 21:13

## 2023-09-18 RX ADMIN — FLUOXETINE 10 MG: 10 CAPSULE ORAL at 08:57

## 2023-09-18 ASSESSMENT — PAIN SCALES - GENERAL
PAINLEVEL_OUTOF10: 0

## 2023-09-18 ASSESSMENT — PAIN SCALES - WONG BAKER: WONGBAKER_NUMERICALRESPONSE: 0

## 2023-09-18 NOTE — PROGRESS NOTES
Pt found awake in his recliner working with speech therapy. Physical assessment and vital signs as charted. Pt currently denies experiencing any pain at this time. Chair alarm engaged. Call light placed within reach. RN will continue to monitor Pt.

## 2023-09-18 NOTE — PROGRESS NOTES
ACUTE REHAB UNIT  SPEECH/LANGUAGE PATHOLOGY      [x] Daily  [] Weekly Care Conference Note  [] Discharge    Patient:Grady Cordon Her      ATQ:[birth year]  LAUREL:[id number]  Rehab Dx/Hx: Acute CVA (cerebrovascular accident) ([address]) [I63.9]    Precautions: [] Aspiration  [x] Fall risk  [] Sternal  [] Seizure [] Hip  [] Weight Bearing [x] Other: delirium     ST Dx: [] Aphasia  [] Dysarthria  [] Apraxia   [x] Oropharyngeal dysphagia [x] Cognitive Impairment  [] Other:   Date of Admit: 9/8/2023  Room #: 3109/3109-01  Date: 9/18/2023          Current Diet Order:ADULT DIET; Dysphagia - Soft and Bite Sized, Thin liquids with single sip and double hard swallow  ADULT ORAL NUTRITION SUPPLEMENT; Breakfast, Lunch, Dinner; Standard High Calorie/High Protein Oral Supplement   Recommended Form of Meds: PO  Compensatory Swallowing Strategies : Alternate solids and liquids, Remain upright for 30-45 minutes after meals, Upright as possible for all oral intake, Small bites/sips, Lingual sweep (Single sips thin liquids, double hard swallow for liquids), single sip, double hard swallows  Lives With: Spouse        Occupation: Retired  Type of Occupation:  for [employer].        Dentition: Adequate  Vision  Vision: Impaired  Vision Exceptions: Wears glasses for reading, Visual field cut  Hearing  Hearing: Within functional limits  Barriers toward progress: Limited insight into deficits        Date: 9/18/2023      Tx session 1 Tx session 2   Total Timed Code Min 20 30   Total Treatment Minutes 33 30   Individual Treatment Minutes 33 30   Group Treatment Minutes 0 0   Co-Treat Minutes 0 0   Brief Exception: N/A N/A   Pain None indicated None indicated    Pain Intervention: [] RN notified  [] Repositioned  [] Intervention offered and patient declined  [x] N/A  [] Other: [] RN notified  [] Repositioned  [] Intervention offered and patient declined  [x] N/A  [] Other:    Subjective:     Pt upright in wheelchair upon entry just completed

## 2023-09-18 NOTE — PROGRESS NOTES
Comprehensive Nutrition Assessment    RECOMMENDATIONS:  PO Diet: Dysphagia- soft & bite sized; continue   ONS: Ensure +HP TID; magic cup daily   Nutrition Education: Education not indicated     NUTRITION ASSESSMENT:   Nutritional summary & status: Follow up. Pt continues on a dysphagia- soft & bite sized diet w/ meal intakes averaging 25%. PO intake insufficient in meeting needs. Noted intakes have not improved since previous RD visit; however, pt was receptive to Ensure +HP TID w/ meals and ONS intakes >76% all supplemental intake. Would rec'd adding magic cups daily in addition to ensure's & continuing to encourage optimal intakes/nutrition status during remainder of stay. Will assess ONS acceptance/intakes at f/u visit & modify prn. RD following. Admission // PMH: PMHx of recent right embolic CVA, right carotid artery stenosis, HTN, HLD, CAD s/p PCI, prostate cancer s/p radiation and currently on Leupron hormonal therapy    MALNUTRITION ASSESSMENT  Context of Malnutrition: Acute Illness   Malnutrition Status:  At risk for malnutrition (Comment)  Findings of the 6 clinical characteristics of malnutrition (Minimum of 2 out of 6 clinical characteristics is required to make the diagnosis of moderate or severe Protein Calorie Malnutrition based on AND/ASPEN Guidelines):  Energy Intake:  Mild decrease in energy intake (Comment)  Weight Loss:  No significant weight loss     Body Fat Loss:  No significant body fat loss     Muscle Mass Loss:  No significant muscle mass loss    Fluid Accumulation:  No significant fluid accumulation     Strength:  Not Performed    NUTRITION DIAGNOSIS   Inadequate oral intake related to swallowing difficulty, cognitive or neurological impairment as evidenced by intake 0-25%    Nutrition Monitoring and Evaluation:   Food/Nutrient Intake Outcomes:  Food and Nutrient Intake, Supplement Intake  Physical Signs/Symptoms Outcomes:  Biochemical Data, Weight, Hemodynamic Status, Nutrition

## 2023-09-18 NOTE — PLAN OF CARE
Problem: Discharge Planning  Goal: Discharge to home or other facility with appropriate resources  Outcome: Progressing     Problem: Safety - Adult  Goal: Free from fall injury  9/17/2023 2236 by Trevor Hicks RN  Outcome: Progressing  Flowsheets (Taken 9/17/2023 2235)  Free From Fall Injury: Instruct family/caregiver on patient safety  9/17/2023 0930 by Syed Ortiz RN  Outcome: Progressing  Flowsheets (Taken 9/17/2023 0930)  Free From Fall Injury: Instruct family/caregiver on patient safety     Problem: Skin/Tissue Integrity  Goal: Absence of new skin breakdown  Description: 1. Monitor for areas of redness and/or skin breakdown  2. Assess vascular access sites hourly  3. Every 4-6 hours minimum:  Change oxygen saturation probe site  4. Every 4-6 hours:  If on nasal continuous positive airway pressure, respiratory therapy assess nares and determine need for appliance change or resting period.   9/17/2023 2236 by Trevor Hicks RN  Outcome: Progressing  9/17/2023 0930 by Syed Ortiz RN  Outcome: Progressing     Problem: ABCDS Injury Assessment  Goal: Absence of physical injury  9/17/2023 2236 by Trevor Hicks RN  Outcome: Progressing  9/17/2023 0930 by Syed Ortiz RN  Outcome: Progressing  Flowsheets (Taken 9/17/2023 0930)  Absence of Physical Injury: Implement safety measures based on patient assessment     Problem: Pain  Goal: Verbalizes/displays adequate comfort level or baseline comfort level  9/17/2023 2236 by Trevor Hicks RN  Outcome: Progressing  Flowsheets (Taken 9/17/2023 1947)  Verbalizes/displays adequate comfort level or baseline comfort level:   Encourage patient to monitor pain and request assistance   Assess pain using appropriate pain scale   Administer analgesics based on type and severity of pain and evaluate response  9/17/2023 0930 by Syed Ortiz RN  Outcome: Progressing  Flowsheets (Taken 9/17/2023 0930)  Verbalizes/displays adequate comfort level or baseline comfort level:

## 2023-09-18 NOTE — PROGRESS NOTES
60        Therapy Time   Individual Concurrent Group Co-treatment   Time In       1245   Time Out       1345   Minutes       60   Timed Code Treatment Minutes:  60 min + 60 mins     Total Treatment Minutes:  [facility] min        Beatriz Robbins, OT

## 2023-09-18 NOTE — PROGRESS NOTES
Physical Therapy  Facility/Department: [facility] ACUTE REHAB UNIT  Rehabilitation Physical Therapy Treatment Note    NAME: Gladys Ridley  : 1950 (74 y.o.)  MRN: [id number]  CODE STATUS: Limited    Date of Service: 23       Restrictions:  Restrictions/Precautions: Up as Tolerated, Fall Risk, Modified Diet  Position Activity Restriction  Other position/activity restrictions: up with  assistance     SUBJECTIVE  Subjective  Subjective: Pt sleeping in bed, easily arsouable. Able to track more to the left  Pain: no pain reported        OBJECTIVE  Orientation  Overall Orientation Status: Impaired  Orientation Level: Disoriented to time (Able to state he had a stroke, unsure of the day or the time of day)    Functional Mobility  Roll Left  Assistance Level: Contact guard assist;Moderate assistance  Skilled Clinical Factors: Initally CGA with use of handrails, next trial pt unable to follow commands and needing mod A  Roll Right  Assistance Level: Maximum assistance  Skilled Clinical Factors: Increased assistance for donning pants in the bed, rolling for putting pants on. Supine to Sit  Assistance Level: Maximum assistance; Requires x 2 assistance  Skilled Clinical Factors: Pt requiring increased assistance at LE's and trunk, pt noted to have increase pain when hands were under shoulders  Scooting  Assistance Level: Dependent; Requires x 2 assistance  Skilled Clinical Factors: Scooting to hips to the left in the wheelchair  Balance  Sitting Balance:  (In the wheelchair for oral care, underarm care, and changing of the shirt)  Transfers  Surface: Wheelchair;From bed  Additional Factors: Set-up; Verbal cues; Hand placement cues; Increased time to complete  Bed To/From Chair  Technique: Sit pivot  Assistance Level: Requires x 2 assistance;Maximum assistance  Skilled Clinical Factors: Pt able to reach with R hand and help pull himself to the wheelchair, increased assistance needed with clearing bottom      Environmental

## 2023-09-19 PROCEDURE — 6370000000 HC RX 637 (ALT 250 FOR IP): Performed by: INTERNAL MEDICINE

## 2023-09-19 PROCEDURE — 97535 SELF CARE MNGMENT TRAINING: CPT

## 2023-09-19 PROCEDURE — 97130 THER IVNTJ EA ADDL 15 MIN: CPT

## 2023-09-19 PROCEDURE — 97129 THER IVNTJ 1ST 15 MIN: CPT

## 2023-09-19 PROCEDURE — 97530 THERAPEUTIC ACTIVITIES: CPT

## 2023-09-19 PROCEDURE — 6370000000 HC RX 637 (ALT 250 FOR IP): Performed by: STUDENT IN AN ORGANIZED HEALTH CARE EDUCATION/TRAINING PROGRAM

## 2023-09-19 PROCEDURE — 97112 NEUROMUSCULAR REEDUCATION: CPT

## 2023-09-19 PROCEDURE — 92526 ORAL FUNCTION THERAPY: CPT

## 2023-09-19 PROCEDURE — 6370000000 HC RX 637 (ALT 250 FOR IP): Performed by: PHYSICAL MEDICINE & REHABILITATION

## 2023-09-19 PROCEDURE — 1280000000 HC REHAB R&B

## 2023-09-19 RX ADMIN — MONTELUKAST 10 MG: 10 TABLET, FILM COATED ORAL at 21:17

## 2023-09-19 RX ADMIN — VERAPAMIL HYDROCHLORIDE 180 MG: 240 TABLET, FILM COATED, EXTENDED RELEASE ORAL at 23:21

## 2023-09-19 RX ADMIN — Medication 100 MG: at 08:50

## 2023-09-19 RX ADMIN — NYSTATIN 500000 UNITS: 100000 SUSPENSION ORAL at 17:36

## 2023-09-19 RX ADMIN — CETIRIZINE HYDROCHLORIDE 10 MG: 10 TABLET, FILM COATED ORAL at 21:17

## 2023-09-19 RX ADMIN — Medication 5 MG: at 21:16

## 2023-09-19 RX ADMIN — NYSTATIN 500000 UNITS: 100000 SUSPENSION ORAL at 08:53

## 2023-09-19 RX ADMIN — TAMSULOSIN HYDROCHLORIDE 0.4 MG: 0.4 CAPSULE ORAL at 08:50

## 2023-09-19 RX ADMIN — ACETAMINOPHEN 650 MG: 325 TABLET ORAL at 12:42

## 2023-09-19 RX ADMIN — NYSTATIN 500000 UNITS: 100000 SUSPENSION ORAL at 12:43

## 2023-09-19 RX ADMIN — FLUOXETINE 10 MG: 10 CAPSULE ORAL at 08:50

## 2023-09-19 RX ADMIN — NYSTATIN 500000 UNITS: 100000 SUSPENSION ORAL at 21:17

## 2023-09-19 RX ADMIN — POTASSIUM BICARBONATE 20 MEQ: 782 TABLET, EFFERVESCENT ORAL at 08:49

## 2023-09-19 RX ADMIN — EZETIMIBE 10 MG: 10 TABLET ORAL at 21:17

## 2023-09-19 RX ADMIN — METHYLPHENIDATE HYDROCHLORIDE 5 MG: 10 TABLET ORAL at 08:50

## 2023-09-19 ASSESSMENT — PAIN DESCRIPTION - ORIENTATION: ORIENTATION: RIGHT;ANTERIOR

## 2023-09-19 ASSESSMENT — PAIN DESCRIPTION - LOCATION: LOCATION: HEAD

## 2023-09-19 ASSESSMENT — PAIN SCALES - GENERAL
PAINLEVEL_OUTOF10: 3
PAINLEVEL_OUTOF10: 0

## 2023-09-19 ASSESSMENT — PAIN DESCRIPTION - DESCRIPTORS: DESCRIPTORS: THROBBING

## 2023-09-19 ASSESSMENT — PAIN DESCRIPTION - PAIN TYPE: TYPE: ACUTE PAIN

## 2023-09-19 ASSESSMENT — PAIN DESCRIPTION - FREQUENCY: FREQUENCY: INTERMITTENT

## 2023-09-19 ASSESSMENT — PAIN DESCRIPTION - ONSET: ONSET: GRADUAL

## 2023-09-19 ASSESSMENT — PAIN - FUNCTIONAL ASSESSMENT: PAIN_FUNCTIONAL_ASSESSMENT: ACTIVITIES ARE NOT PREVENTED

## 2023-09-19 NOTE — PROGRESS NOTES
Physical Therapy  Facility/Department: [facility] ACUTE REHAB UNIT  Rehabilitation Physical Therapy Treatment Note    NAME: Colton Worley  : 1950 (74 y.o.)  MRN: [id number]  CODE STATUS: Limited    Date of Service: 23       Restrictions:  Restrictions/Precautions: Up as Tolerated, Fall Risk, Modified Diet  Position Activity Restriction  Other position/activity restrictions: up with  assistance     SUBJECTIVE  Subjective  Subjective: Pt sitting in recliner, leaning heavily to the left, sliding forward and pretzal legs in recliner, with no pants on. Pain: no pain reported      OBJECTIVE  Cognition  Arousal/Alertness: Inconsistent responses to stimuli;Delayed responses to stimuli  Following Commands: Inconsistently follows commands; Follows one step commands with repetition; Follows one step commands with increased time  Attention Span: Difficulty dividing attention; Difficulty attending to directions; Unable to maintain attention  Memory: Decreased recall of recent events  Safety Judgement: Decreased awareness of need for safety;Decreased awareness of need for assistance  Problem Solving: Decreased awareness of errors;Assistance required to identify errors made;Assistance required to generate solutions;Assistance required to implement solutions;Assistance required to correct errors made  Insights: Decreased awareness of deficits  Initiation: Requires cues for all  Sequencing: Requires cues for all  Cognition Comment: Arousal and participation continue to improve, increased attention to L side w/ cues to attend. Pt continues to demo poor insight into deficits and high distractability. Orientation  Overall Orientation Status: Impaired    Functional Mobility  Balance  Sitting Balance: Dependent/Total (Sitting on the EOM, SBA-Dependent with BUE support. ~20 minutes working on sitting posture.)  Transfers  Surface: Wheelchair;From bed  Additional Factors: Set-up; Verbal cues; Hand placement cues; Increased time to

## 2023-09-19 NOTE — PROGRESS NOTES
w/c.  Transfers  Surface: From chair with arms; To mat;From mat; Wheelchair  Additional Factors: Verbal cues; Hand placement cues; Increased time to complete  Device: Lift equipment  Bed To/From Chair  Assistance Level: Requires x 2 assistance  Skilled Clinical Factors: Roxi transfer recliner --> w/c --> bed w/ assist x2. Sit Pivot  Assistance Level: Dependent; Requires x 2 assistance  Skilled Clinical Factors: Assist x2 sit pivot t/f w/c <-> edge of mat toward R side. Weight Bearing  Weight Bearing Technique: Yes  LUE Weight Bearing: Forearm seated  Response To Weight Bearing Technique: Pt seated edge of mat, faciltiated WB thru L forearm using bedside table in order to maintain neutral positioning and protect integrity of shoulder joint. Attempted elbow WB thru mat w/ elbow props, but pt's L shoulder subluxation at risk for worsening. Pt maintain WB ~20 mins during static seated balance task ranging from dep-SBA (short intervals of SBA only). OT Exercises  Static Sitting Balance Exercises: Seated edge of mat, pt's LUE WB on bedside table in order to protect shoulder joint. Used chair w/ blue dycem for hand placement of RUE in order to promote midline/neutral postioning and decrease pushing toward L side. Pt participated in activity ~20 minutes requiring dep-SBA x1 for seated balance, short intervals <1 min for SBA. Pt w/ high distractability and often times defiant during task. Assessment  Assessment  Assessment: Pt continues to make minimal progress on a daily basis w/ ADL and fxl mobility tasks, requiring use of roxi lift and 2 person assist for sit pivot transfers. Pt's sitting balance has improved w/ less pushing toward the L side, although unable to sustain for greater than 1 minute requiring dep-SBA. Pt w/ trace to no AROM of LUE. Pt is highly distracted and has difficulty attending to tasks.  Pt continues to benefit from skilled OT services, but recommend at a lower level of care at this time Cont

## 2023-09-19 NOTE — CARE COORDINATION
SW met with patient and spouse at bedside to discuss discharge planning. Patient and spouse anticipating patient will need a SNF discharge. Spouse provided SW with a facility to send a referral to. SNF referrals sent and pending. SW following.      Electronically signed by MILADY Jang on 9/19/2023 at 5:59 PM

## 2023-09-19 NOTE — PROGRESS NOTES
Patient alert and oriented to person, somewhat to situation (knows in hospital) but has hallucinations and is confused, shift assessment done as charted, VSS. Worked with therapy, but not making much progress d/t level of confusion makes progress very difficult. Patient can also be impulsive. Today was wiggling in bed and managed to lift up upper body, and leaned toward left side of bed getting in between side rails, staff was able to hear bed alarm, and reposition patient to a safer position. Will continue to monitor.

## 2023-09-19 NOTE — PLAN OF CARE
Problem: Discharge Planning  Goal: Discharge to home or other facility with appropriate resources  Outcome: Progressing  Flowsheets (Taken 9/16/2023 1341 by Griselda Sheppard, RN)  Discharge to home or other facility with appropriate resources: Identify barriers to discharge with patient and caregiver     Problem: Safety - Adult  Goal: Free from fall injury  Outcome: Progressing  Flowsheets (Taken 9/17/2023 2235 by Darell Phelps, RN)  Free From Fall Injury: Instruct family/caregiver on patient safety     Problem: Skin/Tissue Integrity  Goal: Absence of new skin breakdown  Description: 1. Monitor for areas of redness and/or skin breakdown  2. Assess vascular access sites hourly  3. Every 4-6 hours minimum:  Change oxygen saturation probe site  4. Every 4-6 hours:  If on nasal continuous positive airway pressure, respiratory therapy assess nares and determine need for appliance change or resting period.   Outcome: Progressing     Problem: ABCDS Injury Assessment  Goal: Absence of physical injury  Outcome: Progressing  Flowsheets (Taken 9/17/2023 0930 by Griselda Sheppard, RN)  Absence of Physical Injury: Implement safety measures based on patient assessment     Problem: Pain  Goal: Verbalizes/displays adequate comfort level or baseline comfort level  Outcome: Progressing  Flowsheets (Taken 9/17/2023 1947 by Darell Phelps, WILIAN)  Verbalizes/displays adequate comfort level or baseline comfort level:   Encourage patient to monitor pain and request assistance   Assess pain using appropriate pain scale   Administer analgesics based on type and severity of pain and evaluate response     Problem: Chronic Conditions and Co-morbidities  Goal: Patient's chronic conditions and co-morbidity symptoms are monitored and maintained or improved  Outcome: Progressing  Flowsheets (Taken 9/14/2023 0913 by Juan Pablo Mckenzie RN)  Care Plan - Patient's Chronic Conditions and Co-Morbidity Symptoms are Monitored and Maintained or Improved:

## 2023-09-19 NOTE — PROGRESS NOTES
ACUTE REHAB UNIT  SPEECH/LANGUAGE PATHOLOGY      [x] Daily  [] Weekly Care Conference Note  [] Discharge    Patient:Grady Rojas      DAX:[birth year]  NOL:[id number]  Rehab Dx/Hx: Acute CVA (cerebrovascular accident) ([address]) [I63.9]    Precautions: [] Aspiration  [x] Fall risk  [] Sternal  [] Seizure [] Hip  [] Weight Bearing [x] Other: delirium     ST Dx: [] Aphasia  [] Dysarthria  [] Apraxia   [x] Oropharyngeal dysphagia [x] Cognitive Impairment  [] Other:   Date of Admit: 9/8/2023  Room #: 3109/3109-01  Date: 9/19/2023          Current Diet Order:ADULT DIET; Dysphagia - Soft and Bite Sized, Thin liquids with single sip and double hard swallow  ADULT ORAL NUTRITION SUPPLEMENT; Breakfast, Lunch, Dinner; Standard High Calorie/High Protein Oral Supplement   Recommended Form of Meds: PO  Compensatory Swallowing Strategies : Alternate solids and liquids, Remain upright for 30-45 minutes after meals, Upright as possible for all oral intake, Small bites/sips, Lingual sweep (Single sips thin liquids, double hard swallow for liquids), single sip, double hard swallows  Lives With: Spouse        Occupation: Retired  Type of Occupation:  for [employer].        Dentition: Adequate  Vision  Vision: Impaired  Vision Exceptions: Wears glasses for reading, Visual field cut  Hearing  Hearing: Within functional limits  Barriers toward progress: Limited insight into deficits        Date: 9/19/2023      Tx session 1 Tx session 2   Total Timed Code Min     Total Treatment Minutes     Individual Treatment Minutes     Group Treatment Minutes 0 0   Co-Treat Minutes 0 0   Brief Exception: N/A N/A   Pain None indicated None indicated    Pain Intervention: [] RN notified  [] Repositioned  [] Intervention offered and patient declined  [x] N/A  [] Other: [] RN notified  [] Repositioned  [] Intervention offered and patient declined  [x] N/A  [] Other:    Subjective:         Objective / Goals:     Pt will tolerate recommended diet

## 2023-09-19 NOTE — PROGRESS NOTES
ACUTE REHAB UNIT  SPEECH/LANGUAGE PATHOLOGY      [x] Daily  [] Weekly Care Conference Note  [] Discharge    Patient:Grady Weber      GQI:9[birth year]  LVD:[id number]  Rehab Dx/Hx: Acute CVA (cerebrovascular accident) ([address]) [I63.9]    Precautions: [] Aspiration  [x] Fall risk  [] Sternal  [] Seizure [] Hip  [] Weight Bearing [x] Other: delirium     ST Dx: [] Aphasia  [] Dysarthria  [] Apraxia   [x] Oropharyngeal dysphagia [x] Cognitive Impairment  [] Other:   Date of Admit: 9/8/2023  Room #: 3109/3109-01  Date: 9/19/2023          Current Diet Order:ADULT DIET; Dysphagia - Soft and Bite Sized, Thin liquids with single sip and double hard swallow  ADULT ORAL NUTRITION SUPPLEMENT; Breakfast, Lunch, Dinner; Standard High Calorie/High Protein Oral Supplement   Recommended Form of Meds: PO  Compensatory Swallowing Strategies : Alternate solids and liquids, Remain upright for 30-45 minutes after meals, Upright as possible for all oral intake, Small bites/sips, Lingual sweep (Single sips thin liquids, double hard swallow for liquids), single sip, double hard swallows  Lives With: Spouse        Occupation: Retired  Type of Occupation:  for [employer].        Dentition: Adequate  Vision  Vision: Impaired  Vision Exceptions: Wears glasses for reading, Visual field cut  Hearing  Hearing: Within functional limits  Barriers toward progress: Limited insight into deficits        Date: 9/19/2023      Tx session 1 Tx session 2   Total Timed Code Min 15 20   Total Treatment Minutes 30 30   Individual Treatment Minutes 30 30   Group Treatment Minutes 0 0   Co-Treat Minutes 0 0   Brief Exception: N/A N/A   Pain None indicated None indicated    Pain Intervention: [] RN notified  [] Repositioned  [] Intervention offered and patient declined  [x] N/A  [] Other: [] RN notified  [] Repositioned  [] Intervention offered and patient declined  [x] N/A  [] Other:    Subjective:     Pt upright in chair upon entry and agreeable to tx

## 2023-09-20 LAB
ANION GAP SERPL CALCULATED.3IONS-SCNC: 10 MMOL/L (ref 3–16)
BUN SERPL-MCNC: 24 MG/DL (ref 7–20)
CALCIUM SERPL-MCNC: 10.2 MG/DL (ref 8.3–10.6)
CHLORIDE SERPL-SCNC: 102 MMOL/L (ref 99–110)
CO2 SERPL-SCNC: 29 MMOL/L (ref 21–32)
CREAT SERPL-MCNC: 0.7 MG/DL (ref 0.8–1.3)
DEPRECATED RDW RBC AUTO: 12.4 % (ref 12.4–15.4)
GFR SERPLBLD CREATININE-BSD FMLA CKD-EPI: >60 ML/MIN/{1.73_M2}
GLUCOSE SERPL-MCNC: 180 MG/DL (ref 70–99)
HCT VFR BLD AUTO: 39.1 % (ref 40.5–52.5)
HGB BLD-MCNC: 13.7 G/DL (ref 13.5–17.5)
MCH RBC QN AUTO: 30.8 PG (ref 26–34)
MCHC RBC AUTO-ENTMCNC: 35 G/DL (ref 31–36)
MCV RBC AUTO: 88 FL (ref 80–100)
PLATELET # BLD AUTO: 248 K/UL (ref 135–450)
PMV BLD AUTO: 9.1 FL (ref 5–10.5)
POTASSIUM SERPL-SCNC: 4.1 MMOL/L (ref 3.5–5.1)
RBC # BLD AUTO: 4.45 M/UL (ref 4.2–5.9)
SODIUM SERPL-SCNC: 141 MMOL/L (ref 136–145)
WBC # BLD AUTO: 7.6 K/UL (ref 4–11)

## 2023-09-20 PROCEDURE — 97530 THERAPEUTIC ACTIVITIES: CPT

## 2023-09-20 PROCEDURE — 36415 COLL VENOUS BLD VENIPUNCTURE: CPT

## 2023-09-20 PROCEDURE — 97130 THER IVNTJ EA ADDL 15 MIN: CPT

## 2023-09-20 PROCEDURE — 1280000000 HC REHAB R&B

## 2023-09-20 PROCEDURE — 97535 SELF CARE MNGMENT TRAINING: CPT

## 2023-09-20 PROCEDURE — 6370000000 HC RX 637 (ALT 250 FOR IP): Performed by: STUDENT IN AN ORGANIZED HEALTH CARE EDUCATION/TRAINING PROGRAM

## 2023-09-20 PROCEDURE — 97112 NEUROMUSCULAR REEDUCATION: CPT

## 2023-09-20 PROCEDURE — 6370000000 HC RX 637 (ALT 250 FOR IP): Performed by: PHYSICAL MEDICINE & REHABILITATION

## 2023-09-20 PROCEDURE — 80048 BASIC METABOLIC PNL TOTAL CA: CPT

## 2023-09-20 PROCEDURE — 97129 THER IVNTJ 1ST 15 MIN: CPT

## 2023-09-20 PROCEDURE — 85027 COMPLETE CBC AUTOMATED: CPT

## 2023-09-20 PROCEDURE — 97110 THERAPEUTIC EXERCISES: CPT

## 2023-09-20 PROCEDURE — 6370000000 HC RX 637 (ALT 250 FOR IP): Performed by: INTERNAL MEDICINE

## 2023-09-20 PROCEDURE — 92526 ORAL FUNCTION THERAPY: CPT

## 2023-09-20 RX ADMIN — POTASSIUM BICARBONATE 20 MEQ: 782 TABLET, EFFERVESCENT ORAL at 10:41

## 2023-09-20 RX ADMIN — NYSTATIN 500000 UNITS: 100000 SUSPENSION ORAL at 10:40

## 2023-09-20 RX ADMIN — VERAPAMIL HYDROCHLORIDE 180 MG: 240 TABLET, FILM COATED, EXTENDED RELEASE ORAL at 20:33

## 2023-09-20 RX ADMIN — NYSTATIN 500000 UNITS: 100000 SUSPENSION ORAL at 13:59

## 2023-09-20 RX ADMIN — ACETAMINOPHEN 650 MG: 325 TABLET ORAL at 17:53

## 2023-09-20 RX ADMIN — FLUOXETINE 10 MG: 10 CAPSULE ORAL at 10:41

## 2023-09-20 RX ADMIN — CETIRIZINE HYDROCHLORIDE 10 MG: 10 TABLET, FILM COATED ORAL at 20:33

## 2023-09-20 RX ADMIN — NYSTATIN 500000 UNITS: 100000 SUSPENSION ORAL at 20:33

## 2023-09-20 RX ADMIN — NYSTATIN 500000 UNITS: 100000 SUSPENSION ORAL at 17:53

## 2023-09-20 RX ADMIN — EZETIMIBE 10 MG: 10 TABLET ORAL at 20:32

## 2023-09-20 RX ADMIN — TAMSULOSIN HYDROCHLORIDE 0.4 MG: 0.4 CAPSULE ORAL at 10:40

## 2023-09-20 RX ADMIN — Medication 100 MG: at 10:40

## 2023-09-20 RX ADMIN — MONTELUKAST 10 MG: 10 TABLET, FILM COATED ORAL at 20:32

## 2023-09-20 RX ADMIN — Medication 5 MG: at 20:33

## 2023-09-20 RX ADMIN — METHYLPHENIDATE HYDROCHLORIDE 5 MG: 10 TABLET ORAL at 06:44

## 2023-09-20 ASSESSMENT — PAIN SCALES - GENERAL
PAINLEVEL_OUTOF10: 0

## 2023-09-20 ASSESSMENT — PAIN DESCRIPTION - LOCATION: LOCATION: HEAD

## 2023-09-20 ASSESSMENT — PAIN DESCRIPTION - FREQUENCY: FREQUENCY: INTERMITTENT

## 2023-09-20 ASSESSMENT — PAIN DESCRIPTION - PAIN TYPE: TYPE: ACUTE PAIN

## 2023-09-20 ASSESSMENT — PAIN SCALES - WONG BAKER
WONGBAKER_NUMERICALRESPONSE: 2
WONGBAKER_NUMERICALRESPONSE: 0

## 2023-09-20 ASSESSMENT — PAIN - FUNCTIONAL ASSESSMENT: PAIN_FUNCTIONAL_ASSESSMENT: ACTIVITIES ARE NOT PREVENTED

## 2023-09-20 ASSESSMENT — PAIN DESCRIPTION - DESCRIPTORS: DESCRIPTORS: ACHING

## 2023-09-20 ASSESSMENT — PAIN DESCRIPTION - ONSET: ONSET: OTHER (COMMENT)

## 2023-09-20 NOTE — PROGRESS NOTES
Physical Therapy  Facility/Department: [facility] ACUTE REHAB UNIT  Rehabilitation Physical Therapy Treatment Note    NAME: Gladys Ridley  : 1950 (74 y.o.)  MRN: [id number]  CODE STATUS: Limited    Date of Service: 23       Restrictions:  Restrictions/Precautions: Up as Tolerated, Fall Risk, Modified Diet  Position Activity Restriction  Other position/activity restrictions: up with  assistance     SUBJECTIVE  Subjective  Subjective: Pt sleeping in bed, easily to arsouable. Pain: no pain reported       OBJECTIVE  Cognition  Arousal/Alertness: Inconsistent responses to stimuli;Delayed responses to stimuli  Following Commands: Inconsistently follows commands; Follows one step commands with repetition; Follows one step commands with increased time  Attention Span: Difficulty dividing attention; Difficulty attending to directions; Unable to maintain attention  Memory: Decreased recall of recent events  Safety Judgement: Decreased awareness of need for safety;Decreased awareness of need for assistance  Problem Solving: Decreased awareness of errors;Assistance required to identify errors made;Assistance required to generate solutions;Assistance required to implement solutions;Assistance required to correct errors made  Insights: Decreased awareness of deficits  Initiation: Requires cues for all  Sequencing: Requires cues for all  Orientation  Overall Orientation Status: Impaired    Functional Mobility  Roll Left  Assistance Level: Moderate assistance  Skilled Clinical Factors: Mod A with little to no initation  Roll Right  Assistance Level: Maximum assistance  Skilled Clinical Factors: Increased assistance for donning pants in the bed, rolling for putting pants on and placing of the nate pad. Balance  Sitting Balance: Dependent/Total (Pt seating on the EOM, anywhere from dependent to SBA)  Transfers  Surface: Wheelchair;From bed  Additional Factors: Set-up; Verbal cues; Hand placement cues; Increased time to

## 2023-09-20 NOTE — PROGRESS NOTES
Occupational Therapy  Facility/Department: [facility] ACUTE REHAB UNIT  Rehabilitation Occupational Therapy Daily Treatment Note    Date: 23  Patient Name: Jason Zabala       Room: 3109/3109-01  MRN: [id number]  Account: [de-identified]   : 1950  (74 y.o.) Gender: male                    Past Medical History:  has a past medical history of Angioedema, ED (erectile dysfunction), History of colon polyps, History of Aj-Barr virus infection, History of rectal polyps, Idiopathic urticaria, MI (myocardial infarction) ([address]), Osteoarthritis, RA (rheumatoid arthritis) ([address]), Tear of medial cartilage or meniscus of knee, current, and Unspecified essential hypertension. Past Surgical History:   has a past surgical history that includes Cardiac surgery; other surgical history (2018); hip surgery; Coronary angioplasty with stent (); pr egd transoral biopsy single/multiple (N/A, 2018); Colonoscopy (N/A, 2018); Appendectomy; pr dstrj lesion anus simple surg excision (N/A, 10/9/2018); pr colonoscopy flx dx w/collj spec when pfrmd (N/A, 10/9/2018); Colonoscopy (N/A, 2019); Knee arthroscopy (Bilateral); and Colonoscopy (N/A, 3/5/2020). Restrictions  Restrictions/Precautions: Up as Tolerated, Fall Risk, Modified Diet  Other position/activity restrictions: up with  assistance    Subjective  Subjective: Pt semi supine upon OT entry. Denied pain, although reports pain w/ movement of LUE/LLE. Increased tone noted in both extremities. Pt sexually inappropriate this date, requiring maximum cues for redirection. Pt stated numerous times he thought he was getting  to this therapist, although proper orientation was provided each instance. Co-tx indicated in order to maximize therapeutic potential.  Restrictions/Precautions: Up as Tolerated; Fall Risk;Modified Diet             Objective     Cognition  Arousal/Alertness: Inconsistent responses to stimuli;Delayed responses to stimuli  Following Commands:

## 2023-09-20 NOTE — PROGRESS NOTES
ACUTE REHAB UNIT  SPEECH/LANGUAGE PATHOLOGY      [x] Daily  [] Weekly Care Conference Note  [] Discharge    Patient:Grady Martin      BKR:[birth year]  NER:[id number]  Rehab Dx/Hx: Acute CVA (cerebrovascular accident) ([address]) [I63.9]    Precautions: [] Aspiration  [x] Fall risk  [] Sternal  [] Seizure [] Hip  [] Weight Bearing [x] Other: delirium     ST Dx: [] Aphasia  [] Dysarthria  [] Apraxia   [x] Oropharyngeal dysphagia [x] Cognitive Impairment  [] Other:   Date of Admit: 9/8/2023  Room #: 3109/3109-01  Date: 9/20/2023          Current Diet Order:ADULT DIET; Dysphagia - Soft and Bite Sized, Thin liquids with single sip and double hard swallow  ADULT ORAL NUTRITION SUPPLEMENT; Breakfast, Lunch, Dinner; Standard High Calorie/High Protein Oral Supplement   Recommended Form of Meds: PO  Compensatory Swallowing Strategies : Alternate solids and liquids, Remain upright for 30-45 minutes after meals, Upright as possible for all oral intake, Small bites/sips, Lingual sweep (Single sips thin liquids, double hard swallow for liquids), single sip, double hard swallows  Lives With: Spouse        Occupation: Retired  Type of Occupation:  for [employer].        Dentition: Adequate  Vision  Vision: Impaired  Vision Exceptions: Wears glasses for reading, Visual field cut  Hearing  Hearing: Within functional limits  Barriers toward progress: Limited insight into deficits        Date: 9/20/2023       Tx session 1 Tx session 2 Tx session 3   Total Timed Code Min 12 30 23   Total Treatment Minutes 25 30 23   Individual Treatment Minutes 25 30 23   Group Treatment Minutes 0 0 0   Co-Treat Minutes 0 0 0   Brief Exception: N/A N/A N/A   Pain None indicated None indicated  None indicated   Pain Intervention: [] RN notified  [] Repositioned  [] Intervention offered and patient declined  [x] N/A  [] Other: [] RN notified  [] Repositioned  [] Intervention offered and patient declined  [x] N/A  [] Other:  [] RN notified  []

## 2023-09-20 NOTE — PATIENT CARE CONFERENCE
months)  [] Sleep Apnea [] Other    Risk for Readmission: Low (0-9), Moderate (10-19), High (20 or greater)- 15%  Critical Items: If High Risk, consider the following recommendations: Follow up with PCP within one week of discharge.     Justification for Continued Stay:   Criteria for continued IRF stay:  Based on my medical assessment of the patient and review of information from the interdisciplinary team, as part of this weekly team conference, the patient continues to meet the following criteria for IRF level of care:  [x] The patient requires 24-hour rehabilitation nursing care   [x] The patient requires an intensive rehabilitation therapy program  [x] The patient requires active and ongoing intervention of multiple therapy disciplines  [x] The patient requires continued physician supervision by a rehabilitation physician  [x] The patient requires an intensive and coordinated interdisciplinary team approach to the delivery of rehabilitative care    Medical Necessity-continued close physician medical management is required for:   [] Cardiac/Circulatory dysfunction  [] Respiratory/Pulmonary dysfunction  [] Integumentary complications  [] Peripheral Vascular dysfunction  [] Musculoskeletal dysfunction  [x] Neurological dysfunction d/t:  [x] CVA  [] SCI  [] TBI  [] Other: __________  [] Renal dysfunction  [] Hematologic dysfunction    [] Endocrine disorders  [] GI disorders     [] Genito-Urinary dysfunction    Assessment/Plan:  [x] The patient is making good progression towards their LTG's, is actively participating in, and has a reasonable expectation to continue to benefit from the intensive rehabilitation program.  [] The estimated discharge date has been changed from initial team conference due to:   [] The estimated discharge destination has been changed from initial team conference due to:     Rehab Team Members in attendance for Team Conference:  ARU Supervisor/PPS Coordinator:  Keith Cavanaugh

## 2023-09-20 NOTE — PLAN OF CARE
Problem: Discharge Planning  Goal: Discharge to home or other facility with appropriate resources  9/20/2023 0026 by Naeem Duke RN  Outcome: Progressing  Discharge to home or other facility with appropriate resources: Identify barriers to discharge with patient and caregiver     Problem: Safety - Adult  Goal: Free from fall injury  9/20/2023 0026 by Naeem Duke RN  Outcome: Progressing  Pt remains free from falls during this stay on the ARU. 1:1 and education provided on the importance of using call light to ask for assistance prior to transfers and ambulation. Pt voices understanding. Will continue to monitor and re-educate as needed. Problem: Skin/Tissue Integrity  Goal: Absence of new skin breakdown  Description: 1. Monitor for areas of redness and/or skin breakdown  2. Assess vascular access sites hourly  3. Every 4-6 hours minimum:  Change oxygen saturation probe site  4. Every 4-6 hours:  If on nasal continuous positive airway pressure, respiratory therapy assess nares and determine need for appliance change or resting period. 9/20/2023 0026 by Naeem Duke RN  Note: Skin is kept clean and dry. Pt. Turns self, encouraged to turn and reposition to relieve pressure off bony prominences to improve or maintain skin integrity. No new skin issues found. No s/sx of infection noted. Will continue to monitor. Outcome: Progressing    Problem: ABCDS Injury Assessment  Goal: Absence of physical injury  9/20/2023 0026 by Naeem Duke RN  Outcome: Progressing  Pt remains free from accidental injury during this stay on the ARU. Will continue to monitor pt and assess per schedule and prn.      Problem: Pain  Goal: Verbalizes/displays adequate comfort level or baseline comfort level  9/19/2023 1705 by John Rico RN  Outcome: Progressing  Flowsheets (Taken 9/17/2023 1947 by Kevin Mejía RN)  Verbalizes/displays adequate comfort level or baseline comfort level:   Encourage patient to monitor

## 2023-09-20 NOTE — PROGRESS NOTES
Pt. Sleeping quietly in bed, sitter at bedside, shift assessment complete, VSS, afebrile, denies pain thus far, Pt. A & O X 2-3, able to tell name, place and situation,with intermittent confusion and hallucination. Call light and over bed table within reach, fall prevention measures remains in place, hourly rounding and frequent visual checks ongoing.

## 2023-09-21 PROCEDURE — 97129 THER IVNTJ 1ST 15 MIN: CPT

## 2023-09-21 PROCEDURE — 6370000000 HC RX 637 (ALT 250 FOR IP): Performed by: INTERNAL MEDICINE

## 2023-09-21 PROCEDURE — 97130 THER IVNTJ EA ADDL 15 MIN: CPT

## 2023-09-21 PROCEDURE — 97110 THERAPEUTIC EXERCISES: CPT

## 2023-09-21 PROCEDURE — 97530 THERAPEUTIC ACTIVITIES: CPT

## 2023-09-21 PROCEDURE — 6370000000 HC RX 637 (ALT 250 FOR IP): Performed by: PHYSICAL MEDICINE & REHABILITATION

## 2023-09-21 PROCEDURE — 1280000000 HC REHAB R&B

## 2023-09-21 PROCEDURE — 6370000000 HC RX 637 (ALT 250 FOR IP): Performed by: STUDENT IN AN ORGANIZED HEALTH CARE EDUCATION/TRAINING PROGRAM

## 2023-09-21 PROCEDURE — 97535 SELF CARE MNGMENT TRAINING: CPT

## 2023-09-21 PROCEDURE — 92526 ORAL FUNCTION THERAPY: CPT

## 2023-09-21 RX ADMIN — NYSTATIN 500000 UNITS: 100000 SUSPENSION ORAL at 14:10

## 2023-09-21 RX ADMIN — FLUOXETINE 10 MG: 10 CAPSULE ORAL at 08:37

## 2023-09-21 RX ADMIN — Medication 100 MG: at 08:37

## 2023-09-21 RX ADMIN — VERAPAMIL HYDROCHLORIDE 120 MG: 120 TABLET, FILM COATED, EXTENDED RELEASE ORAL at 20:59

## 2023-09-21 RX ADMIN — EZETIMIBE 10 MG: 10 TABLET ORAL at 20:59

## 2023-09-21 RX ADMIN — METHYLPHENIDATE HYDROCHLORIDE 5 MG: 10 TABLET ORAL at 06:06

## 2023-09-21 RX ADMIN — NYSTATIN 500000 UNITS: 100000 SUSPENSION ORAL at 20:59

## 2023-09-21 RX ADMIN — CETIRIZINE HYDROCHLORIDE 10 MG: 10 TABLET, FILM COATED ORAL at 20:59

## 2023-09-21 RX ADMIN — NYSTATIN 500000 UNITS: 100000 SUSPENSION ORAL at 18:03

## 2023-09-21 RX ADMIN — POTASSIUM BICARBONATE 20 MEQ: 782 TABLET, EFFERVESCENT ORAL at 08:37

## 2023-09-21 RX ADMIN — MONTELUKAST 10 MG: 10 TABLET, FILM COATED ORAL at 20:59

## 2023-09-21 RX ADMIN — TAMSULOSIN HYDROCHLORIDE 0.4 MG: 0.4 CAPSULE ORAL at 08:37

## 2023-09-21 RX ADMIN — Medication 5 MG: at 20:59

## 2023-09-21 RX ADMIN — NYSTATIN 500000 UNITS: 100000 SUSPENSION ORAL at 08:37

## 2023-09-21 NOTE — PLAN OF CARE
Problem: Chronic Conditions and Co-morbidities  Goal: Patient's chronic conditions and co-morbidity symptoms are monitored and maintained or improved  9/21/2023 0651 by Vickey Tavarez RN  Outcome: Progressing  Flowsheets (Taken 9/20/2023 2030)  Care Plan - Patient's Chronic Conditions and Co-Morbidity Symptoms are Monitored and Maintained or Improved:   Monitor and assess patient's chronic conditions and comorbid symptoms for stability, deterioration, or improvement   Collaborate with multidisciplinary team to address chronic and comorbid conditions and prevent exacerbation or deterioration   Update acute care plan with appropriate goals if chronic or comorbid symptoms are exacerbated and prevent overall improvement and discharge    Problem: Safety - Adult  Goal: Free from fall injury  9/21/2023 0651 by Vickey Tavarez RN  Outcome: Progressing  Pt. With confusion and impulsive behavior, making attempts to get out of bed and bradford periods of hallucination, for patient safety, sitter at bedside, fall alarm activated, video surveillance in place, bed wheels locked and in lowest position. Pt. Will be free from falls during this shift. No falls noted thus far during this shift. Problem: Skin/Tissue Integrity  Goal: Absence of new skin breakdown  Description: 1. Monitor for areas of redness and/or skin breakdown  2. Assess vascular access sites hourly  3. Every 4-6 hours minimum:  Change oxygen saturation probe site  4. Every 4-6 hours:  If on nasal continuous positive airway pressure, respiratory therapy assess nares and determine need for appliance change or resting period. 9/21/2023 0651 by Vickey Tavarez RN  Outcome: Progressing  Specialty bed in place, pt. Assisted with turning and repositioning Q2 hrs and PRN, zinc paste applied to buttocks, no new skin issues noted thus far.      Problem: ABCDS Injury Assessment  Goal: Absence of physical injury  9/21/2023 0651 by Vickey Tavarez RN  Outcome:

## 2023-09-21 NOTE — PROGRESS NOTES
Pt. Lying quietly in bed, impulsive at times trying to put legs over bed rail, diversion and reorientation provided. Sitter remains in place. Shift assessment complete, Pt. With hx of elevated BP, see flow sheet, criteria not met for PRN Hydralazine. Call light and over bed table within reach, hourly rounding and frequent visual checks in place.

## 2023-09-21 NOTE — PROGRESS NOTES
09/21/23 1450   Encounter Summary   Encounter Overview/Reason  Spiritual/Emotional Needs   Service Provided For: Patient   Referral/Consult From: Fredy   Last Encounter  09/21/23  (visit w/pt, support, blessing ke 9/21/23)   Complexity of Encounter Moderate   Begin Time 1320   End Time  1330   Total Time Calculated 10 min   Spiritual/Emotional needs   Type Spiritual Support   Rituals, Rites and Sacraments   Type Blessings   Assessment/Intervention/Outcome   Assessment Coping; Hopeful   Intervention Active listening;Explored/Affirmed feelings, thoughts, concerns;Sustaining Presence/Ministry of presence   Outcome Encouraged;Expressed Gratitude     Rev. Kasi Colbert

## 2023-09-21 NOTE — PROGRESS NOTES
assistance  Skilled Clinical Factors: Mod A with using hand rail  Roll Right  Assistance Level: Dependent  Skilled Clinical Factors: Increased assistance for donning pants in the bed, rolling for putting pants on  Balance  Sitting Balance: Dependent/Total (initally dependent in the rolling shower chair increasing to SBA)  Standing Balance: Dependent/Total (With PT)  Transfers  Surface: Wheelchair; To bed (to/from rolling shower chair)  Additional Factors: Set-up; Verbal cues; Hand placement cues; Increased time to complete  Sit to Stand  Assistance Level: Dependent; Requires x 2 assistance  Skilled Clinical Factors: Dependentx3 to stand to PT for doffing pants  Stand to Sit  Assistance Level: Dependent; Requires x 2 assistance  Skilled Clinical Factors: Dependent x3  Bed To/From Chair  Technique: Sit pivot (3-4 person assistance)  Assistance Level: Dependent; Requires x 2 assistance  Skilled Clinical Factors: wheelchair<RSC x3-4 [facility] to Bed 3-4 assistance        PT Exercises  Exercise Treatment: Pt supine in bed performed hamstring stretch, knee to chest, plantar flexion, shoulder flexion stretches ~5 minutes each position elicited random LLE spasms. Pt then required assistance for shoulder shrugs, elbow flexion, punches x10 each. ASSESSMENT/PROGRESS TOWARDS GOALS       Assessment  Assessment: Pt continues to make little to no progress with functional mobility and transfers this date requiring 3-4 person assistance on and off the shower chair. Pt continues to be inappropriate with staff stating \"those are the sexiest uniforms I've ever seen\" and at one point tried to grasp PT's chest. He was redirected but continued to make inappropriate comments. PCA was in the room and stated \"I think she is 10 and I would be a child molester\". Pt continues to be more aware of the left side during therapy and able to scan to left when being talked to. He would benefit from a less intensive functional/physical therapy aspect.  PT for

## 2023-09-21 NOTE — PROGRESS NOTES
Occupational Therapy  Facility/Department: [facility] ACUTE REHAB UNIT  Rehabilitation Occupational Therapy Daily Treatment Note    Date: 23  Patient Name: Meghan Moise       Room: 3109/3109-01  MRN: [id number]  Account: [de-identified]   : 1950  (74 y.o.) Gender: male                    Past Medical History:  has a past medical history of Angioedema, ED (erectile dysfunction), History of colon polyps, History of Aj-Barr virus infection, History of rectal polyps, Idiopathic urticaria, MI (myocardial infarction) ([address]), Osteoarthritis, RA (rheumatoid arthritis) ([address]), Tear of medial cartilage or meniscus of knee, current, and Unspecified essential hypertension. Past Surgical History:   has a past surgical history that includes Cardiac surgery; other surgical history (2018); hip surgery; Coronary angioplasty with stent (); pr egd transoral biopsy single/multiple (N/A, 2018); Colonoscopy (N/A, 2018); Appendectomy; pr dstrj lesion anus simple surg excision (N/A, 10/9/2018); pr colonoscopy flx dx w/collj spec when pfrmd (N/A, 10/9/2018); Colonoscopy (N/A, 2019); Knee arthroscopy (Bilateral); and Colonoscopy (N/A, 3/5/2020). Restrictions  Restrictions/Precautions: Up as Tolerated, Fall Risk, Modified Diet  Other position/activity restrictions: up with  assistance    Subjective  Subjective: Pt seated in w/c upon OT entry. Agreeable to shower. Co-tx indicated in order to maximize therapeutic potential.  Restrictions/Precautions: Up as Tolerated; Fall Risk;Modified Diet             Objective     Cognition  Arousal/Alertness: Inconsistent responses to stimuli;Delayed responses to stimuli  Following Commands: Inconsistently follows commands; Follows one step commands with repetition; Follows one step commands with increased time  Attention Span: Difficulty dividing attention; Difficulty attending to directions; Unable to maintain attention  Memory: Decreased recall of recent events  Safety

## 2023-09-21 NOTE — PROGRESS NOTES
ACUTE REHAB UNIT  SPEECH/LANGUAGE PATHOLOGY      [x] Daily  [x] Weekly Care Conference Note  [] Discharge    Patient:Grady Pope      AJW:[birth year]  [facility]:[id number]  Rehab Dx/Hx: Acute CVA (cerebrovascular accident) ([address]) [I63.9]    Precautions: [] Aspiration  [x] Fall risk  [] Sternal  [] Seizure [] Hip  [] Weight Bearing [x] Other: delirium     ST Dx: [] Aphasia  [] Dysarthria  [] Apraxia   [x] Oropharyngeal dysphagia [x] Cognitive Impairment  [] Other:   Date of Admit: 9/8/2023  Room #: 3109/3109-01  Date: 9/21/2023          Current Diet Order:ADULT DIET; Dysphagia - Soft and Bite Sized, Thin liquids with single sip and double hard swallow  ADULT ORAL NUTRITION SUPPLEMENT; Breakfast, Lunch, Dinner; Standard High Calorie/High Protein Oral Supplement   Recommended Form of Meds: PO  Compensatory Swallowing Strategies : Alternate solids and liquids, Remain upright for 30-45 minutes after meals, Upright as possible for all oral intake, Small bites/sips, Lingual sweep (Single sips thin liquids, double hard swallow for liquids), single sip, double hard swallows  Lives With: Spouse        Occupation: Retired  Type of Occupation:  for [employer].        Dentition: Adequate  Vision  Vision: Impaired  Vision Exceptions: Wears glasses for reading, Visual field cut  Hearing  Hearing: Within functional limits  Barriers toward progress: Limited insight into deficits        Date: 9/21/2023        Tx session 1 Tx session 2 Tx session 3 Weekly Summary   Total Timed Code Min 15 27 30    Total Treatment Minutes 30 27 30    Individual Treatment Minutes 30 27 30    Group Treatment Minutes 0 0 0    Co-Treat Minutes 0 0 0    Brief Exception: N/A N/A N/A    Pain None indicated None indicated  None indicated    Pain Intervention: [] RN notified  [] Repositioned  [] Intervention offered and patient declined  [x] N/A  [] Other: [] RN notified  [] Repositioned  [] Intervention offered and patient declined  [x] N/A  [] Other:

## 2023-09-22 LAB
ANION GAP SERPL CALCULATED.3IONS-SCNC: 9 MMOL/L (ref 3–16)
BUN SERPL-MCNC: 22 MG/DL (ref 7–20)
CALCIUM SERPL-MCNC: 10 MG/DL (ref 8.3–10.6)
CHLORIDE SERPL-SCNC: 103 MMOL/L (ref 99–110)
CO2 SERPL-SCNC: 28 MMOL/L (ref 21–32)
CREAT SERPL-MCNC: 0.8 MG/DL (ref 0.8–1.3)
DEPRECATED RDW RBC AUTO: 12.3 % (ref 12.4–15.4)
GFR SERPLBLD CREATININE-BSD FMLA CKD-EPI: >60 ML/MIN/{1.73_M2}
GLUCOSE SERPL-MCNC: 118 MG/DL (ref 70–99)
HCT VFR BLD AUTO: 37.8 % (ref 40.5–52.5)
HGB BLD-MCNC: 13.3 G/DL (ref 13.5–17.5)
MCH RBC QN AUTO: 30.9 PG (ref 26–34)
MCHC RBC AUTO-ENTMCNC: 35.2 G/DL (ref 31–36)
MCV RBC AUTO: 87.9 FL (ref 80–100)
PLATELET # BLD AUTO: 248 K/UL (ref 135–450)
PMV BLD AUTO: 9.2 FL (ref 5–10.5)
POTASSIUM SERPL-SCNC: 3.8 MMOL/L (ref 3.5–5.1)
RBC # BLD AUTO: 4.3 M/UL (ref 4.2–5.9)
SODIUM SERPL-SCNC: 140 MMOL/L (ref 136–145)
WBC # BLD AUTO: 6.7 K/UL (ref 4–11)

## 2023-09-22 PROCEDURE — 85027 COMPLETE CBC AUTOMATED: CPT

## 2023-09-22 PROCEDURE — 6370000000 HC RX 637 (ALT 250 FOR IP): Performed by: STUDENT IN AN ORGANIZED HEALTH CARE EDUCATION/TRAINING PROGRAM

## 2023-09-22 PROCEDURE — 36415 COLL VENOUS BLD VENIPUNCTURE: CPT

## 2023-09-22 PROCEDURE — 97530 THERAPEUTIC ACTIVITIES: CPT | Performed by: PHYSICAL THERAPIST

## 2023-09-22 PROCEDURE — 97530 THERAPEUTIC ACTIVITIES: CPT

## 2023-09-22 PROCEDURE — 1280000000 HC REHAB R&B

## 2023-09-22 PROCEDURE — 92526 ORAL FUNCTION THERAPY: CPT

## 2023-09-22 PROCEDURE — 6370000000 HC RX 637 (ALT 250 FOR IP): Performed by: INTERNAL MEDICINE

## 2023-09-22 PROCEDURE — 97535 SELF CARE MNGMENT TRAINING: CPT

## 2023-09-22 PROCEDURE — 97130 THER IVNTJ EA ADDL 15 MIN: CPT

## 2023-09-22 PROCEDURE — 80048 BASIC METABOLIC PNL TOTAL CA: CPT

## 2023-09-22 PROCEDURE — 97129 THER IVNTJ 1ST 15 MIN: CPT

## 2023-09-22 PROCEDURE — 97112 NEUROMUSCULAR REEDUCATION: CPT

## 2023-09-22 PROCEDURE — 6370000000 HC RX 637 (ALT 250 FOR IP): Performed by: PHYSICAL MEDICINE & REHABILITATION

## 2023-09-22 RX ADMIN — ACETAMINOPHEN 650 MG: 325 TABLET ORAL at 14:21

## 2023-09-22 RX ADMIN — POTASSIUM BICARBONATE 20 MEQ: 782 TABLET, EFFERVESCENT ORAL at 08:45

## 2023-09-22 RX ADMIN — Medication 5 MG: at 20:48

## 2023-09-22 RX ADMIN — FLUOXETINE 10 MG: 10 CAPSULE ORAL at 08:45

## 2023-09-22 RX ADMIN — NYSTATIN 500000 UNITS: 100000 SUSPENSION ORAL at 14:03

## 2023-09-22 RX ADMIN — NYSTATIN 500000 UNITS: 100000 SUSPENSION ORAL at 18:57

## 2023-09-22 RX ADMIN — TAMSULOSIN HYDROCHLORIDE 0.4 MG: 0.4 CAPSULE ORAL at 08:45

## 2023-09-22 RX ADMIN — MONTELUKAST 10 MG: 10 TABLET, FILM COATED ORAL at 20:49

## 2023-09-22 RX ADMIN — CETIRIZINE HYDROCHLORIDE 10 MG: 10 TABLET, FILM COATED ORAL at 20:49

## 2023-09-22 RX ADMIN — NYSTATIN 500000 UNITS: 100000 SUSPENSION ORAL at 08:46

## 2023-09-22 RX ADMIN — EZETIMIBE 10 MG: 10 TABLET ORAL at 20:49

## 2023-09-22 RX ADMIN — NYSTATIN 500000 UNITS: 100000 SUSPENSION ORAL at 20:49

## 2023-09-22 RX ADMIN — Medication 100 MG: at 08:45

## 2023-09-22 RX ADMIN — METHYLPHENIDATE HYDROCHLORIDE 5 MG: 10 TABLET ORAL at 08:46

## 2023-09-22 RX ADMIN — VERAPAMIL HYDROCHLORIDE 120 MG: 120 TABLET, FILM COATED, EXTENDED RELEASE ORAL at 20:49

## 2023-09-22 ASSESSMENT — PAIN - FUNCTIONAL ASSESSMENT: PAIN_FUNCTIONAL_ASSESSMENT: ACTIVITIES ARE NOT PREVENTED

## 2023-09-22 ASSESSMENT — PAIN SCALES - GENERAL
PAINLEVEL_OUTOF10: 4
PAINLEVEL_OUTOF10: 0

## 2023-09-22 ASSESSMENT — PAIN SCALES - WONG BAKER
WONGBAKER_NUMERICALRESPONSE: 0
WONGBAKER_NUMERICALRESPONSE: 6

## 2023-09-22 ASSESSMENT — PAIN DESCRIPTION - LOCATION: LOCATION: BACK;OTHER (COMMENT)

## 2023-09-22 ASSESSMENT — PAIN DESCRIPTION - ORIENTATION: ORIENTATION: MID

## 2023-09-22 ASSESSMENT — PAIN DESCRIPTION - PAIN TYPE: TYPE: ACUTE PAIN

## 2023-09-22 ASSESSMENT — PAIN DESCRIPTION - FREQUENCY: FREQUENCY: INTERMITTENT

## 2023-09-22 ASSESSMENT — PAIN DESCRIPTION - DESCRIPTORS: DESCRIPTORS: ACHING;THROBBING

## 2023-09-22 ASSESSMENT — PAIN DESCRIPTION - ONSET: ONSET: GRADUAL

## 2023-09-22 NOTE — PROGRESS NOTES
doing AAROM to LLE. PT utilized contract/relax techniques with  the L side doing hip/knee flex/ext and then add/ abd in hooklying position. Breann 5 reps with active contraction with both quads , hip flex and add but unable to isolate hip abd    Functional Mobility  Bed Mobility  Additional Factors: Verbal cues; Increased time to complete; With handrails; Head of bed flat  Bridging  Assistance Level: Minimal assistance  Skilled Clinical Factors: Positioned LLE into flex with foot positioned flat as set up for bridging used for scooting both lat ( to L  and caudal > cephalo. Pt req step by step instructions to sequence the activity. HOB used with L  Roll Left  Assistance Level: Min  assistance  Skilled Clinical Factors: Min  A with using hand rail, cues for sequencing and positioning LLE into flex for greater ease with transition  Roll Right  Assistance Level: Min / Mod A  assistance  Skilled Clinical Factors: Cues to grasp LUE and bring over midline prior to initiating roll, req'd inc time and min/mod  A at hip/trunk to achieve R sidelying though pt able to assist partially  Supine to Sit  Assistance Level: Mod A   Skilled Clinical Factors: Inc time and cues to manage LLE off bed, step by step cues to assist w/ VC/ TC to control grading the movement  Balance  Sitting Balance: (Sitting EOB pt B feet supported. Req CGA/ MIN A as PT instructed pt w/ grading movements in ant/ post direction and with controlled transitions from R elbow propping to midline and sitting w/ UES supported on lap. Pt breann sitting balance activities x 15 min. Pt reported that he had to urinate. PT placed urinal and notified PCA ( sitter) that pt had the urinal intact but req additional time. Call light  placed within reach. Alarm reactivated. ASSESSMENT/PROGRESS TOWARDS GOALS       Assessment  Assessment: pt appropriate and participatory with therapy session this time and able to follow simple commands with inc time and cues.  Pt improved with

## 2023-09-22 NOTE — PLAN OF CARE
Problem: Discharge Planning  Goal: Discharge to home or other facility with appropriate resources  Outcome: Progressing  Flowsheets (Taken 9/22/2023 0815)  Discharge to home or other facility with appropriate resources:   Identify barriers to discharge with patient and caregiver   Identify discharge learning needs (meds, wound care, etc)     Problem: Safety - Adult  Goal: Free from fall injury  Outcome: Progressing  Note: Fall precautions in place. Bed is in low and locked position. Bed alarms set. Call light and bedside table within reach. Sitter at room. Frequent visual checks made. Problem: Skin/Tissue Integrity  Goal: Absence of new skin breakdown  Outcome: Progressing  Note: Skin cool and dry. Scrotum, groin and aline area reddened.  Skin cleansed and zinc barrier cream applied     Problem: Pain  Goal: Verbalizes/displays adequate comfort level or baseline comfort level  Outcome: Progressing  Flowsheets (Taken 9/22/2023 0815)  Verbalizes/displays adequate comfort level or baseline comfort level:   Encourage patient to monitor pain and request assistance   Assess pain using appropriate pain scale   Administer analgesics based on type and severity of pain and evaluate response   Implement non-pharmacological measures as appropriate and evaluate response

## 2023-09-22 NOTE — PROGRESS NOTES
Patient alert to self, somewhat to situation off and on, to city, disoriented to time, with significant variable confusion. Patient was more alert this morning, even able to verbalize need to urinate and was able to do so continent with urinal. Worked with therapy, but still very difficult to transfer, continue using nate, however patient did not have it under him when went to transfer to bed, 2 person dependent transfer performed. Patient did not help, still recommend using lift as much as possible.

## 2023-09-22 NOTE — PROGRESS NOTES
Findings     OBJECTIVE DATA: Significant to nutrition assessment  Nutrition Related Findings: +BM 9/21. Trace LUE edema. BUN 22, Glu 180  Wounds: None  Nutrition Goals: PO intake 50% or greater, by next RD assessment     CURRENT NUTRITION THERAPIES  ADULT ORAL NUTRITION SUPPLEMENT; Breakfast, Lunch, Dinner; Standard High Calorie/High Protein Oral Supplement  ADULT ORAL NUTRITION SUPPLEMENT; Lunch; Frozen Oral Supplement  ADULT DIET; Dysphagia - Soft and Bite Sized  PO Intake: 1-25%   PO Supplement Intake:%  Additional Sources of Calories/IVF:N/A     COMPARATIVE STANDARDS  Energy (kcal):  2051-4177 (20-25 kcal/kg CBW)     Protein (g):  71-89 (0.8-1.0 g/kg CBW)       Fluid (ml/day):  or per MD    ANTHROPOMETRICS  Current Height: 5' 11\" (180.3 cm)  Current Weight - Scale: 179 lb 0.2 oz (81.2 kg)    Admission weight: 194 lb 14.2 oz (88.4 kg)    The patient will be monitored per nutrition standards of care. Consult dietitian if additional nutrition interventions are needed prior to RD reassessment.      Tonia Granados, [address] [address]:  [phone]  Office:  [phone]

## 2023-09-22 NOTE — PLAN OF CARE
Problem: Discharge Planning  Goal: Discharge to home or other facility with appropriate resources  9/22/2023 1436 by John Rico RN  Outcome: Progressing     Problem: Safety - Adult  Goal: Free from fall injury  9/22/2023 1436 by John Rico RN  Outcome: Progressing     Problem: Skin/Tissue Integrity  Goal: Absence of new skin breakdown  Description: 1. Monitor for areas of redness and/or skin breakdown  2. Assess vascular access sites hourly  3. Every 4-6 hours minimum:  Change oxygen saturation probe site  4. Every 4-6 hours:  If on nasal continuous positive airway pressure, respiratory therapy assess nares and determine need for appliance change or resting period.   9/22/2023 1436 by John Rico RN  Outcome: Progressing  Note: Zinc ointment used, skin improved groin excoriation       Problem: ABCDS Injury Assessment  Goal: Absence of physical injury  Outcome: Progressing  Flowsheets (Taken 9/20/2023 1128 by Luís Bonilla RN)  Absence of Physical Injury: Implement safety measures based on patient assessment     Problem: Pain  Goal: Verbalizes/displays adequate comfort level or baseline comfort level  9/22/2023 1436 by John Rico RN  Outcome: Progressing  Flowsheets (Taken 9/22/2023 0815 by Virginie Sawyer RN)  Verbalizes/displays adequate comfort level or baseline comfort level:   Encourage patient to monitor pain and request assistance   Assess pain using appropriate pain scale   Administer analgesics based on type and severity of pain and evaluate response   Implement non-pharmacological measures as appropriate and evaluate response  Note: Able to voice pain, medicated accordingly     Problem: Chronic Conditions and Co-morbidities  Goal: Patient's chronic conditions and co-morbidity symptoms are monitored and maintained or improved  Outcome: Progressing  Flowsheets (Taken 9/21/2023 0814 by Juventino Heard RN)  Care Plan - Patient's Chronic Conditions and Co-Morbidity Symptoms are

## 2023-09-22 NOTE — PROGRESS NOTES
ACUTE REHAB UNIT  SPEECH/LANGUAGE PATHOLOGY      [x] Daily  [] Weekly Care Conference Note  [] Discharge    Patient:Grady Guo      VJS:[birth year]  FUY:[id number]  Rehab Dx/Hx: Acute CVA (cerebrovascular accident) ([address]) [I63.9]    Precautions: [x] Aspiration  [x] Fall risk  [] Sternal  [] Seizure [] Hip  [] Weight Bearing [x] Other: delirium precautions  ST Dx: [] Aphasia  [] Dysarthria  [] Apraxia   [x] Oropharyngeal dysphagia [x] Cognitive Impairment  [] Other:   Date of Admit: 9/8/2023  Room #: 3109/3109-01  Date: 9/22/2023          Current Diet Order:ADULT DIET; Dysphagia - Soft and Bite Sized, Thin liquids with single sip and double hard swallow  ADULT ORAL NUTRITION SUPPLEMENT; Breakfast, Lunch, Dinner; Standard High Calorie/High Protein Oral Supplement   Recommended Form of Meds: PO  Compensatory Swallowing Strategies : Alternate solids and liquids, Remain upright for 30-45 minutes after meals, Upright as possible for all oral intake, Small bites/sips, Lingual sweep (Single sips thin liquids, double hard swallow for liquids), single sip, double hard swallows  Lives With: Spouse  Occupation: Retired  Type of Occupation:  for [employer]. Impressions 9/13/2023:  Pt presents with perioral weakness resulting in escape of liquids beyond mid-chin without apparent sensation (can not rule out contribution of facial hair), reduced bolus cohesion with loss to floor of the mouth with partial recollection anteriorly, bolus prep and mastication is timely and efficient with rotary pattern noted, brisk tongue movement for bolus transport with minimal lingual pumping. Significantly increased reduced bolus control with thin liquids with half of bolus loss to floor of mouth with retention. There is reduced coordination of straw use with pt attempt to propel bolus to pharynx while accepting more liquid via straw.  Oral residue is moderate with thin liquids with spillover deep into the pharynx after the

## 2023-09-23 PROCEDURE — 6370000000 HC RX 637 (ALT 250 FOR IP): Performed by: PHYSICAL MEDICINE & REHABILITATION

## 2023-09-23 PROCEDURE — 6370000000 HC RX 637 (ALT 250 FOR IP): Performed by: STUDENT IN AN ORGANIZED HEALTH CARE EDUCATION/TRAINING PROGRAM

## 2023-09-23 PROCEDURE — 81001 URINALYSIS AUTO W/SCOPE: CPT

## 2023-09-23 PROCEDURE — 87077 CULTURE AEROBIC IDENTIFY: CPT

## 2023-09-23 PROCEDURE — 1280000000 HC REHAB R&B

## 2023-09-23 PROCEDURE — 6370000000 HC RX 637 (ALT 250 FOR IP): Performed by: INTERNAL MEDICINE

## 2023-09-23 PROCEDURE — 87086 URINE CULTURE/COLONY COUNT: CPT

## 2023-09-23 PROCEDURE — 87186 SC STD MICRODIL/AGAR DIL: CPT

## 2023-09-23 RX ADMIN — CETIRIZINE HYDROCHLORIDE 10 MG: 10 TABLET, FILM COATED ORAL at 21:20

## 2023-09-23 RX ADMIN — Medication 100 MG: at 10:28

## 2023-09-23 RX ADMIN — EZETIMIBE 10 MG: 10 TABLET ORAL at 21:20

## 2023-09-23 RX ADMIN — POTASSIUM BICARBONATE 20 MEQ: 782 TABLET, EFFERVESCENT ORAL at 10:28

## 2023-09-23 RX ADMIN — Medication 5 MG: at 21:20

## 2023-09-23 RX ADMIN — VERAPAMIL HYDROCHLORIDE 120 MG: 120 TABLET, FILM COATED, EXTENDED RELEASE ORAL at 21:20

## 2023-09-23 RX ADMIN — NYSTATIN 500000 UNITS: 100000 SUSPENSION ORAL at 18:41

## 2023-09-23 RX ADMIN — NYSTATIN 500000 UNITS: 100000 SUSPENSION ORAL at 10:28

## 2023-09-23 RX ADMIN — MONTELUKAST 10 MG: 10 TABLET, FILM COATED ORAL at 21:20

## 2023-09-23 RX ADMIN — METHYLPHENIDATE HYDROCHLORIDE 5 MG: 10 TABLET ORAL at 10:28

## 2023-09-23 RX ADMIN — NYSTATIN 500000 UNITS: 100000 SUSPENSION ORAL at 21:20

## 2023-09-23 RX ADMIN — FLUOXETINE 10 MG: 10 CAPSULE ORAL at 10:28

## 2023-09-23 RX ADMIN — TAMSULOSIN HYDROCHLORIDE 0.4 MG: 0.4 CAPSULE ORAL at 10:28

## 2023-09-23 ASSESSMENT — PAIN SCALES - GENERAL
PAINLEVEL_OUTOF10: 0
PAINLEVEL_OUTOF10: 0

## 2023-09-23 NOTE — PROGRESS NOTES
Patient is alert to person, place and situation. Vital signs stable. Patient states \"I want to go home\"  Medications given in pudding. Oral care done. Patient denies pain or discomfort. Patient appears calm and cooperative at this time. Sitter at door. Patient has been incontinent of urine. Su care done. Repositioned in bed. Call light and bedside table within reach. Frequent visual checks made.

## 2023-09-23 NOTE — PLAN OF CARE
Problem: Discharge Planning  Goal: Discharge to home or other facility with appropriate resources  9/23/2023 1501 by Manasa Brunner, RN  Outcome: Kamran Rojas (Taken 9/23/2023 2367)  Discharge to home or other facility with appropriate resources: Identify barriers to discharge with patient and caregiver     Problem: Safety - Adult  Goal: Free from fall injury  9/23/2023 1501 by Manasa Brunner, RN  Outcome: Progressing  Flowsheets (Taken 9/23/2023 1450)  Free From Fall Injury: Instruct family/caregiver on patient safety

## 2023-09-23 NOTE — PLAN OF CARE
Problem: Discharge Planning  Goal: Discharge to home or other facility with appropriate resources  9/23/2023 0201 by Mehul Escalera RN  Outcome: Progressing  Flowsheets (Taken 9/23/2023 0201)  Discharge to home or other facility with appropriate resources:   Identify barriers to discharge with patient and caregiver   Identify discharge learning needs (meds, wound care, etc)     Problem: Safety - Adult  Goal: Free from fall injury  9/23/2023 0201 by Mehul Escalera RN  Outcome: Progressing  Flowsheets (Taken 9/23/2023 0201)  Free From Fall Injury: Instruct family/caregiver on patient safety  Note: Fall precautions in place. Bed is in low and locked position. Bed alarm set. Call light and bedside table within reach. Sitter at door. [employer] camera in use. Problem: Skin/Tissue Integrity  Goal: Absence of new skin breakdown  9/23/2023 0201 by Mehul Escalera RN  Outcome: Progressing  Note: Skin warm and dry. Incontinent of urine. Frequent aline care and repositioning done. No new skin issues noted.      Problem: ABCDS Injury Assessment  Goal: Absence of physical injury  9/22/2023 1436 by Lionel Ralph RN  Outcome: Progressing  Flowsheets (Taken 9/20/2023 1128 by Qian Clay RN)  Absence of Physical Injury: Implement safety measures based on patient assessment

## 2023-09-24 LAB
AMORPH SED URNS QL MICRO: ABNORMAL /HPF
BACTERIA URNS QL MICRO: ABNORMAL /HPF
BILIRUB UR QL STRIP.AUTO: NEGATIVE
CLARITY UR: CLEAR
COLOR UR: YELLOW
GLUCOSE UR STRIP.AUTO-MCNC: NEGATIVE MG/DL
HGB UR QL STRIP.AUTO: ABNORMAL
KETONES UR STRIP.AUTO-MCNC: NEGATIVE MG/DL
LEUKOCYTE ESTERASE UR QL STRIP.AUTO: ABNORMAL
NITRITE UR QL STRIP.AUTO: POSITIVE
PH UR STRIP.AUTO: 6.5 [PH] (ref 5–8)
PROT UR STRIP.AUTO-MCNC: ABNORMAL MG/DL
RBC #/AREA URNS HPF: ABNORMAL /HPF (ref 0–4)
SP GR UR STRIP.AUTO: 1.02 (ref 1–1.03)
UA COMPLETE W REFLEX CULTURE PNL UR: YES
UA DIPSTICK W REFLEX MICRO PNL UR: YES
URN SPEC COLLECT METH UR: ABNORMAL
UROBILINOGEN UR STRIP-ACNC: 0.2 E.U./DL
WBC #/AREA URNS HPF: ABNORMAL /HPF (ref 0–5)

## 2023-09-24 PROCEDURE — 1280000000 HC REHAB R&B

## 2023-09-24 PROCEDURE — 6370000000 HC RX 637 (ALT 250 FOR IP): Performed by: PHYSICAL MEDICINE & REHABILITATION

## 2023-09-24 PROCEDURE — 6370000000 HC RX 637 (ALT 250 FOR IP): Performed by: INTERNAL MEDICINE

## 2023-09-24 PROCEDURE — 6370000000 HC RX 637 (ALT 250 FOR IP): Performed by: STUDENT IN AN ORGANIZED HEALTH CARE EDUCATION/TRAINING PROGRAM

## 2023-09-24 RX ADMIN — EZETIMIBE 10 MG: 10 TABLET ORAL at 21:41

## 2023-09-24 RX ADMIN — NYSTATIN 500000 UNITS: 100000 SUSPENSION ORAL at 21:41

## 2023-09-24 RX ADMIN — MONTELUKAST 10 MG: 10 TABLET, FILM COATED ORAL at 21:41

## 2023-09-24 RX ADMIN — NYSTATIN 500000 UNITS: 100000 SUSPENSION ORAL at 17:41

## 2023-09-24 RX ADMIN — NYSTATIN 500000 UNITS: 100000 SUSPENSION ORAL at 14:01

## 2023-09-24 RX ADMIN — NYSTATIN 500000 UNITS: 100000 SUSPENSION ORAL at 09:10

## 2023-09-24 RX ADMIN — VERAPAMIL HYDROCHLORIDE 120 MG: 120 TABLET, FILM COATED, EXTENDED RELEASE ORAL at 21:41

## 2023-09-24 RX ADMIN — METHYLPHENIDATE HYDROCHLORIDE 5 MG: 10 TABLET ORAL at 09:11

## 2023-09-24 RX ADMIN — Medication 100 MG: at 09:11

## 2023-09-24 RX ADMIN — OXYCODONE HYDROCHLORIDE 5 MG: 5 TABLET ORAL at 14:03

## 2023-09-24 RX ADMIN — POTASSIUM BICARBONATE 20 MEQ: 782 TABLET, EFFERVESCENT ORAL at 09:11

## 2023-09-24 RX ADMIN — OXYCODONE HYDROCHLORIDE 5 MG: 5 TABLET ORAL at 00:44

## 2023-09-24 RX ADMIN — TAMSULOSIN HYDROCHLORIDE 0.4 MG: 0.4 CAPSULE ORAL at 09:11

## 2023-09-24 RX ADMIN — Medication 5 MG: at 21:41

## 2023-09-24 RX ADMIN — CETIRIZINE HYDROCHLORIDE 10 MG: 10 TABLET, FILM COATED ORAL at 21:41

## 2023-09-24 RX ADMIN — FLUOXETINE 10 MG: 10 CAPSULE ORAL at 09:11

## 2023-09-24 ASSESSMENT — PAIN SCALES - GENERAL
PAINLEVEL_OUTOF10: 7
PAINLEVEL_OUTOF10: 0
PAINLEVEL_OUTOF10: 8

## 2023-09-24 ASSESSMENT — PAIN DESCRIPTION - ORIENTATION
ORIENTATION: LEFT
ORIENTATION: ANTERIOR

## 2023-09-24 ASSESSMENT — PAIN DESCRIPTION - ONSET
ONSET: GRADUAL
ONSET: GRADUAL

## 2023-09-24 ASSESSMENT — PAIN DESCRIPTION - DESCRIPTORS
DESCRIPTORS: ACHING
DESCRIPTORS: DISCOMFORT

## 2023-09-24 ASSESSMENT — PAIN DESCRIPTION - LOCATION
LOCATION: SHOULDER
LOCATION: HEAD

## 2023-09-24 ASSESSMENT — PAIN DESCRIPTION - FREQUENCY
FREQUENCY: INTERMITTENT
FREQUENCY: INTERMITTENT

## 2023-09-24 ASSESSMENT — PAIN DESCRIPTION - PAIN TYPE
TYPE: CHRONIC PAIN
TYPE: CHRONIC PAIN

## 2023-09-24 NOTE — PROGRESS NOTES
Pt Alert to self. Max assist with transfers x 2 w/nate. Bed alarm activated. Delirium precautions in place w/. Flaccid to LUE/LLE. Turned and repositioned. Checked for incontinence. C/o pain to left shoulder. PRN Pain medication given.

## 2023-09-24 NOTE — PROGRESS NOTES
N.O for UA C & S per Dr. Mary Black due to increase confusion. UA positive and triggered reflex to culture. MD notified via secure message.

## 2023-09-24 NOTE — PLAN OF CARE
Problem: Confusion  Goal: Confusion, delirium, dementia, or psychosis is improved or at baseline  Recent Flowsheet Documentation  Taken 9/24/2023 0902 by Arcadio Gallardo RN  Effect of thought disturbance (confusion, delirium, dementia, or psychosis) are managed with adequate functional status:   Decrease environmental stimuli, including noise as appropriate   Monitor and intervene to maintain adequate nutrition, hydration, elimination, sleep and activity  Outcome: Not Progressing  Decrease environmental stimuli, including noise as appropriate

## 2023-09-24 NOTE — PROGRESS NOTES
Alert and oriented x1. Disoriented to time, place, or situation. Follows commands. Denies pain at this time. No therapy today. Assessment complete. In bed. Wife at bedside. Repositioned for comfort. Continues with incontinence. Su care performed. Applied zinc oxide to buttocks and bilateral groin. Safety measures in place. Denies any needs currently.

## 2023-09-24 NOTE — PLAN OF CARE
are managed with adequate functional status: Decrease environmental stimuli, including noise as appropriate     Problem: Confusion  Goal: Confusion, delirium, dementia, or psychosis is improved or at baseline  Description: INTERVENTIONS:  1. Assess for possible contributors to thought disturbance, including medications, impaired vision or hearing, underlying metabolic abnormalities, dehydration, psychiatric diagnoses, and notify attending LIP  2. [place] high risk fall precautions, as indicated  3. Provide frequent short contacts to provide reality reorientation, refocusing and direction  4. Decrease environmental stimuli, including noise as appropriate  5. Monitor and intervene to maintain adequate nutrition, hydration, elimination, sleep and activity  6. If unable to ensure safety without constant attention obtain sitter and review sitter guidelines with assigned personnel  7.  Initiate Psychosocial CNS and Spiritual Care consult, as indicated  Outcome: Not Progressing  Flowsheets (Taken 9/23/2023 2055)  Effect of thought disturbance (confusion, delirium, dementia, or psychosis) are managed with adequate functional status: Decrease environmental stimuli, including noise as appropriate

## 2023-09-25 LAB
ANION GAP SERPL CALCULATED.3IONS-SCNC: 16 MMOL/L (ref 3–16)
BUN SERPL-MCNC: 23 MG/DL (ref 7–20)
CALCIUM SERPL-MCNC: 10.2 MG/DL (ref 8.3–10.6)
CHLORIDE SERPL-SCNC: 104 MMOL/L (ref 99–110)
CO2 SERPL-SCNC: 23 MMOL/L (ref 21–32)
CREAT SERPL-MCNC: 0.7 MG/DL (ref 0.8–1.3)
DEPRECATED RDW RBC AUTO: 12.5 % (ref 12.4–15.4)
GFR SERPLBLD CREATININE-BSD FMLA CKD-EPI: >60 ML/MIN/{1.73_M2}
GLUCOSE SERPL-MCNC: 124 MG/DL (ref 70–99)
HCT VFR BLD AUTO: 39 % (ref 40.5–52.5)
HGB BLD-MCNC: 13.8 G/DL (ref 13.5–17.5)
MCH RBC QN AUTO: 31 PG (ref 26–34)
MCHC RBC AUTO-ENTMCNC: 35.3 G/DL (ref 31–36)
MCV RBC AUTO: 87.8 FL (ref 80–100)
PLATELET # BLD AUTO: 216 K/UL (ref 135–450)
PMV BLD AUTO: 9.5 FL (ref 5–10.5)
POTASSIUM SERPL-SCNC: 4 MMOL/L (ref 3.5–5.1)
RBC # BLD AUTO: 4.44 M/UL (ref 4.2–5.9)
SODIUM SERPL-SCNC: 143 MMOL/L (ref 136–145)
WBC # BLD AUTO: 8.2 K/UL (ref 4–11)

## 2023-09-25 PROCEDURE — 85027 COMPLETE CBC AUTOMATED: CPT

## 2023-09-25 PROCEDURE — 92526 ORAL FUNCTION THERAPY: CPT

## 2023-09-25 PROCEDURE — 6370000000 HC RX 637 (ALT 250 FOR IP): Performed by: PHYSICAL MEDICINE & REHABILITATION

## 2023-09-25 PROCEDURE — 6370000000 HC RX 637 (ALT 250 FOR IP): Performed by: INTERNAL MEDICINE

## 2023-09-25 PROCEDURE — 97129 THER IVNTJ 1ST 15 MIN: CPT

## 2023-09-25 PROCEDURE — 6370000000 HC RX 637 (ALT 250 FOR IP): Performed by: STUDENT IN AN ORGANIZED HEALTH CARE EDUCATION/TRAINING PROGRAM

## 2023-09-25 PROCEDURE — 97530 THERAPEUTIC ACTIVITIES: CPT | Performed by: PHYSICAL THERAPIST

## 2023-09-25 PROCEDURE — 97130 THER IVNTJ EA ADDL 15 MIN: CPT

## 2023-09-25 PROCEDURE — 97535 SELF CARE MNGMENT TRAINING: CPT

## 2023-09-25 PROCEDURE — 97112 NEUROMUSCULAR REEDUCATION: CPT

## 2023-09-25 PROCEDURE — 97530 THERAPEUTIC ACTIVITIES: CPT

## 2023-09-25 PROCEDURE — 80048 BASIC METABOLIC PNL TOTAL CA: CPT

## 2023-09-25 PROCEDURE — 36415 COLL VENOUS BLD VENIPUNCTURE: CPT

## 2023-09-25 PROCEDURE — 1280000000 HC REHAB R&B

## 2023-09-25 RX ORDER — CIPROFLOXACIN 500 MG/1
500 TABLET, FILM COATED ORAL EVERY 12 HOURS SCHEDULED
Status: DISCONTINUED | OUTPATIENT
Start: 2023-09-25 | End: 2023-09-29 | Stop reason: HOSPADM

## 2023-09-25 RX ADMIN — NYSTATIN 500000 UNITS: 100000 SUSPENSION ORAL at 17:40

## 2023-09-25 RX ADMIN — POTASSIUM BICARBONATE 20 MEQ: 782 TABLET, EFFERVESCENT ORAL at 09:12

## 2023-09-25 RX ADMIN — EZETIMIBE 10 MG: 10 TABLET ORAL at 20:10

## 2023-09-25 RX ADMIN — MONTELUKAST 10 MG: 10 TABLET, FILM COATED ORAL at 20:10

## 2023-09-25 RX ADMIN — CIPROFLOXACIN 500 MG: 500 TABLET, FILM COATED ORAL at 10:39

## 2023-09-25 RX ADMIN — NYSTATIN 500000 UNITS: 100000 SUSPENSION ORAL at 14:12

## 2023-09-25 RX ADMIN — FLUOXETINE 10 MG: 10 CAPSULE ORAL at 09:12

## 2023-09-25 RX ADMIN — METHYLPHENIDATE HYDROCHLORIDE 5 MG: 10 TABLET ORAL at 09:12

## 2023-09-25 RX ADMIN — Medication 100 MG: at 09:12

## 2023-09-25 RX ADMIN — NYSTATIN 500000 UNITS: 100000 SUSPENSION ORAL at 09:12

## 2023-09-25 RX ADMIN — TAMSULOSIN HYDROCHLORIDE 0.4 MG: 0.4 CAPSULE ORAL at 09:12

## 2023-09-25 RX ADMIN — Medication 5 MG: at 20:10

## 2023-09-25 RX ADMIN — VERAPAMIL HYDROCHLORIDE 120 MG: 120 TABLET, FILM COATED, EXTENDED RELEASE ORAL at 20:13

## 2023-09-25 RX ADMIN — CETIRIZINE HYDROCHLORIDE 10 MG: 10 TABLET, FILM COATED ORAL at 20:10

## 2023-09-25 RX ADMIN — CIPROFLOXACIN 500 MG: 500 TABLET, FILM COATED ORAL at 20:10

## 2023-09-25 RX ADMIN — NYSTATIN 500000 UNITS: 100000 SUSPENSION ORAL at 20:10

## 2023-09-25 NOTE — PLAN OF CARE
Problem: Discharge Planning  Goal: Discharge to home or other facility with appropriate resources  9/25/2023 1115 by Kris Bruner RN  Outcome: Progressing  Flowsheets (Taken 9/25/2023 0900)  Discharge to home or other facility with appropriate resources: Identify barriers to discharge with patient and caregiver     Problem: Safety - Adult  Goal: Free from fall injury  9/25/2023 1115 by Kris Bruner RN  Outcome: Progressing     Problem: Skin/Tissue Integrity  Goal: Absence of new skin breakdown  Description: 1. Monitor for areas of redness and/or skin breakdown  2. Assess vascular access sites hourly  3. Every 4-6 hours minimum:  Change oxygen saturation probe site  4. Every 4-6 hours:  If on nasal continuous positive airway pressure, respiratory therapy assess nares and determine need for appliance change or resting period. Outcome: Progressing  Note: Patient has redness to scrotum and buttocks, skin barrier cream applied. No new skin breakdown noted.      Problem: ABCDS Injury Assessment  Goal: Absence of physical injury  Outcome: Progressing     Problem: Pain  Goal: Verbalizes/displays adequate comfort level or baseline comfort level  9/25/2023 1115 by Kris Bruner RN  Outcome: Progressing  Flowsheets (Taken 9/25/2023 0900)  Verbalizes/displays adequate comfort level or baseline comfort level: Encourage patient to monitor pain and request assistance  Note: Patient denies pain this shift     Problem: Nutrition Deficit:  Goal: Optimize nutritional status  Outcome: Progressing  Note: Patient drank Nutrition supplement, with less than 25% of meal this am.

## 2023-09-25 NOTE — PLAN OF CARE
Problem: Discharge Planning  Goal: Discharge to home or other facility with appropriate resources  Outcome: Progressing  Flowsheets (Taken 9/25/2023 0319)  Discharge to home or other facility with appropriate resources:   Identify barriers to discharge with patient and caregiver   Identify discharge learning needs (meds, wound care, etc)     Problem: Safety - Adult  Goal: Free from fall injury  Outcome: Progressing  Note: Patient has periods of confusion. Safety precautions in place. Bed is in low and locked position. Bed alarm set.  Call light and bedside table with in reach     Problem: Pain  Goal: Verbalizes/displays adequate comfort level or baseline comfort level  Flowsheets  Taken 9/25/2023 0319 by Shayna Fenton RN  Verbalizes/displays adequate comfort level or baseline comfort level:   Encourage patient to monitor pain and request assistance   Assess pain using appropriate pain scale   Administer analgesics based on type and severity of pain and evaluate response   Implement non-pharmacological measures as appropriate and evaluate response  Taken 9/24/2023 1403 by Manasa Brunner RN  Verbalizes/displays adequate comfort level or baseline comfort level: Encourage patient to monitor pain and request assistance

## 2023-09-25 NOTE — PROGRESS NOTES
targeted this session. Goal not targeted this session. Other areas targeted: Sorting via binary piles: pt initially completed with adequate attention to details and sustained attention with improved organization. As task progressed, pt with decreased attention skills and increased impulsivity. Located and utilized nurse call light appropriately. Education:   Educated to rationale for tx session and skills targeted. Educated re: role of SLP, rationale for tx session Educated to rationale for tx session and rationale for swallow strategies to reduce risk of aspiration and airway obstruction. Safety Devices: [x] Call light within reach  [x] Chair alarm activated and connected to nurse call light system  [] Bed alarm activated   [x] Other: reinforced use of call light. [x] Call light within reach  [x] Chair alarm activated and connected to nurse call light system  [] Bed alarm activated  [x] Other: staff at bedside [x] Call light within reach  [] Chair alarm activated and connected to nurse call light system  [x] Bed alarm activated   [] Other:    Assessment: Moderate-severe cognitive-linguistic impairment marked by perseverations, L sided inattention, and confusion. Oropharyngeal phase dysphagia per MBS; limited progress made towards dysphagia goals as pt with limited intake of meal items and only consuming frozen oral supplement and high calorie protein supplement. Plan: Continue as per plan of care. Interventions used this date:  [] Speech/Language Treatment  [] Instruction in HEP  [x] Dysphagia Treatment [x] Cognitive Treatment   [] Other:    Discharge recommendations:  [] Home independently  [] Home with assistance [x]  24 hour supervision  [] ECF [] Other  Continued Tx Upon Discharge: ? [x] Yes    [] No    [] TBD based on progress while on ARU     [] Vital Stim indicated     [] Other:   Estimated discharge date: 9/29/23    Electronically signed by:   Tx session 1+ 3:  Dora Nugent,

## 2023-09-25 NOTE — PROGRESS NOTES
Physical Therapy  Facility/Department: [facility] ACUTE REHAB UNIT  Rehabilitation Physical Therapy Treatment Note    NAME: Komal Travis  : 1950 (74 y.o.)  MRN: [id number]  CODE STATUS: Limited    Date of Service: 23       Restrictions:  Restrictions/Precautions: Up as Tolerated, Fall Risk, Modified Diet  Position Activity Restriction  Other position/activity restrictions: up with  assistance     SUBJECTIVE  Subjective  Subjective: Pt positioned supine in bed upon PT arrival. Pt agreeable to therapy. PCA was cleaning pt up from bladder incontinence  Pain: No c/o pain throughout session        Post Treatment Pain Screening         OBJECTIVE  PT and OT worked collaboratively to utilize the skills of 2 disciplines while maximizing functional mobility to obtain optimal potential.    Cognition  Overall Cognitive Status: Exceptions  Arousal/Alertness: Inconsistent responses to stimuli;Delayed responses to stimuli  Following Commands: Inconsistently follows commands; Follows one step commands with repetition; Follows one step commands with increased time  Attention Span: Difficulty dividing attention; Attends with cues to redirect  Memory: Decreased recall of recent events  Safety Judgement: Decreased awareness of need for safety;Decreased awareness of need for assistance  Problem Solving: Decreased awareness of errors;Assistance required to identify errors made;Assistance required to generate solutions;Assistance required to implement solutions;Assistance required to correct errors made  Insights: Decreased awareness of deficits  Initiation: Requires cues for some  Sequencing: Requires cues for all  Cognition Comment: Pt able to follow commands and recall commands when doing tasks. Pt demoo a consitent delay and appears to have delayed processing of info.  Repetition consistently req  Orientation  Orientation Level: Oriented to situation;Oriented to person    Functional Mobility  Bed Mobility  Additional Factors:

## 2023-09-25 NOTE — CARE COORDINATION
Patient's spouse provided SW additional SNF's to send referrals to. SW sent additional SNF referrals and will be following to see who can accept.     Electronically signed by MILADY Fish on 9/25/2023 at 9:28 AM

## 2023-09-25 NOTE — PROGRESS NOTES
Patient is alert. He is oriented to person, place and situation. He continues to have a level of confusion regarding time. He is cooperative and follows commands. Left side flaccid. Involuntary movement noted of left great toe. Incontinent of urine. Su care done and dry pad applied Instructed to call with any needs.  Call light with reach

## 2023-09-25 NOTE — PROGRESS NOTES
Factors: 3 person lateral scoot/transfer to/from tilt table. Transfers  Surface: From bed; Wheelchair  Additional Factors: Verbal cues; Hand placement cues; Increased time to complete  Bed To/From Chair  Technique: Sit pivot  Assistance Level: Requires x 2 assistance;Maximum assistance;Minimal assistance  Skilled Clinical Factors: Max A x1 + min A x1 sit pivot t/f EOB --> w/c toward L side. 3 attempts required, pt attempting to hold onto bed, given cup to hold in hand. OT Exercises  Static Sitting Balance Exercises: Sitting EOB, pt completed multiple bouts of elbow props w/ RUE in order to facilitate pt achieving center of gravity. Pt required multiple bouts of repetition before achieving, tactile cues provided for head/shoulder positioning. Postural Correction Exercises: Pt in tilt table, almost fully upright, WB thru BLE. Pt required to read whiteboard on L side and reach for objects on R side to facilitate lateral weightshifting. Pt required verbal and tactile cues to achieve midline and overall upright posture. Assessment  Assessment  Assessment: Pt tolerated session well, agreeable to trialing the tilt table to facilitate WB thru BLE. Pt tolerated tilt table ~20 minutes before reporting the need to toilet. Once back into bed, pt reports \"forgetting\" about needing to go to the bathroom. Pt completed sitting balance exercises in order to promote midline orientation, which pt was able to achieve this date w/ less assistance. Less pushing also observed. Pt continues to require a heavy 2 person assist for sit pivot transfers. Pt continues to present below baseline and would benefit from continued OT skilled services. Cont OT POC. Activity Tolerance: Patient tolerated treatment well  Discharge Recommendations: Continue to assess pending progress;24 hour supervision or assist;Subacute/Skilled [facility] in place: Yes  Type of devices: Call light within reach; Chair alarm

## 2023-09-26 LAB
BACTERIA UR CULT: ABNORMAL
ORGANISM: ABNORMAL

## 2023-09-26 PROCEDURE — 92526 ORAL FUNCTION THERAPY: CPT

## 2023-09-26 PROCEDURE — 97530 THERAPEUTIC ACTIVITIES: CPT | Performed by: PHYSICAL THERAPIST

## 2023-09-26 PROCEDURE — 1280000000 HC REHAB R&B

## 2023-09-26 PROCEDURE — 97130 THER IVNTJ EA ADDL 15 MIN: CPT

## 2023-09-26 PROCEDURE — 6370000000 HC RX 637 (ALT 250 FOR IP): Performed by: PHYSICAL MEDICINE & REHABILITATION

## 2023-09-26 PROCEDURE — 97110 THERAPEUTIC EXERCISES: CPT

## 2023-09-26 PROCEDURE — 97129 THER IVNTJ 1ST 15 MIN: CPT

## 2023-09-26 PROCEDURE — 6370000000 HC RX 637 (ALT 250 FOR IP): Performed by: INTERNAL MEDICINE

## 2023-09-26 PROCEDURE — 97112 NEUROMUSCULAR REEDUCATION: CPT

## 2023-09-26 PROCEDURE — 97530 THERAPEUTIC ACTIVITIES: CPT

## 2023-09-26 PROCEDURE — 6370000000 HC RX 637 (ALT 250 FOR IP): Performed by: STUDENT IN AN ORGANIZED HEALTH CARE EDUCATION/TRAINING PROGRAM

## 2023-09-26 RX ADMIN — NYSTATIN 500000 UNITS: 100000 SUSPENSION ORAL at 21:47

## 2023-09-26 RX ADMIN — POTASSIUM BICARBONATE 20 MEQ: 782 TABLET, EFFERVESCENT ORAL at 08:35

## 2023-09-26 RX ADMIN — NYSTATIN 500000 UNITS: 100000 SUSPENSION ORAL at 08:35

## 2023-09-26 RX ADMIN — Medication 5 MG: at 21:48

## 2023-09-26 RX ADMIN — FLUOXETINE 10 MG: 10 CAPSULE ORAL at 08:35

## 2023-09-26 RX ADMIN — OXYCODONE HYDROCHLORIDE 5 MG: 5 TABLET ORAL at 21:47

## 2023-09-26 RX ADMIN — NYSTATIN 500000 UNITS: 100000 SUSPENSION ORAL at 17:41

## 2023-09-26 RX ADMIN — VERAPAMIL HYDROCHLORIDE 120 MG: 120 TABLET, FILM COATED, EXTENDED RELEASE ORAL at 21:47

## 2023-09-26 RX ADMIN — EZETIMIBE 10 MG: 10 TABLET ORAL at 21:47

## 2023-09-26 RX ADMIN — CETIRIZINE HYDROCHLORIDE 10 MG: 10 TABLET, FILM COATED ORAL at 21:47

## 2023-09-26 RX ADMIN — MONTELUKAST 10 MG: 10 TABLET, FILM COATED ORAL at 21:48

## 2023-09-26 RX ADMIN — TAMSULOSIN HYDROCHLORIDE 0.4 MG: 0.4 CAPSULE ORAL at 08:35

## 2023-09-26 RX ADMIN — METHYLPHENIDATE HYDROCHLORIDE 5 MG: 10 TABLET ORAL at 08:38

## 2023-09-26 RX ADMIN — NYSTATIN 500000 UNITS: 100000 SUSPENSION ORAL at 13:58

## 2023-09-26 RX ADMIN — CIPROFLOXACIN 500 MG: 500 TABLET, FILM COATED ORAL at 21:48

## 2023-09-26 RX ADMIN — CIPROFLOXACIN 500 MG: 500 TABLET, FILM COATED ORAL at 08:35

## 2023-09-26 RX ADMIN — Medication 100 MG: at 08:35

## 2023-09-26 ASSESSMENT — PAIN DESCRIPTION - PAIN TYPE: TYPE: CHRONIC PAIN

## 2023-09-26 ASSESSMENT — PAIN - FUNCTIONAL ASSESSMENT: PAIN_FUNCTIONAL_ASSESSMENT: ACTIVITIES ARE NOT PREVENTED

## 2023-09-26 ASSESSMENT — PAIN SCALES - GENERAL
PAINLEVEL_OUTOF10: 7
PAINLEVEL_OUTOF10: 0
PAINLEVEL_OUTOF10: 0

## 2023-09-26 ASSESSMENT — PAIN DESCRIPTION - LOCATION: LOCATION: BACK

## 2023-09-26 ASSESSMENT — PAIN DESCRIPTION - ORIENTATION: ORIENTATION: LOWER

## 2023-09-26 ASSESSMENT — PAIN DESCRIPTION - DESCRIPTORS: DESCRIPTORS: ACHING

## 2023-09-26 ASSESSMENT — PAIN DESCRIPTION - ONSET: ONSET: GRADUAL

## 2023-09-26 ASSESSMENT — PAIN DESCRIPTION - FREQUENCY: FREQUENCY: INTERMITTENT

## 2023-09-26 NOTE — PROGRESS NOTES
Occupational Therapy  Facility/Department: [facility] ACUTE REHAB UNIT  Rehabilitation Occupational Therapy Daily Treatment Note    Date: 23  Patient Name: Melida Mota       Room: 3109/3109-01  MRN: [id number]  Account: [de-identified]   : 1950  (74 y.o.) Gender: male                    Past Medical History:  has a past medical history of Angioedema, ED (erectile dysfunction), History of colon polyps, History of Aj-Barr virus infection, History of rectal polyps, Idiopathic urticaria, MI (myocardial infarction) ([address]), Osteoarthritis, RA (rheumatoid arthritis) ([address]), Tear of medial cartilage or meniscus of knee, current, and Unspecified essential hypertension. Past Surgical History:   has a past surgical history that includes Cardiac surgery; other surgical history (2018); hip surgery; Coronary angioplasty with stent (); pr egd transoral biopsy single/multiple (N/A, 2018); Colonoscopy (N/A, 2018); Appendectomy; pr dstrj lesion anus simple surg excision (N/A, 10/9/2018); pr colonoscopy flx dx w/collj spec when pfrmd (N/A, 10/9/2018); Colonoscopy (N/A, 2019); Knee arthroscopy (Bilateral); and Colonoscopy (N/A, 3/5/2020). Restrictions  Restrictions/Precautions: Up as Tolerated, Fall Risk, Modified Diet  Other position/activity restrictions: up with  assistance    Subjective  Subjective: Pt seim-supine in bed upon arrival, pleasantly confused and agreeable to therapy, denied pain. Sitter present in hallway. Co-tx indicated to maximize safety and therapeutic potential.  Restrictions/Precautions: Up as Tolerated; Fall Risk;Modified Diet             Objective     Cognition  Overall Cognitive Status: Exceptions  Arousal/Alertness: Delayed responses to stimuli;Inconsistent responses to stimuli  Following Commands: Inconsistently follows commands; Follows one step commands with repetition; Follows one step commands with increased time  Attention Span: Difficulty dividing attention; Attends with

## 2023-09-26 NOTE — PLAN OF CARE
Problem: Discharge Planning  Goal: Discharge to home or other facility with appropriate resources  Outcome: Progressing  Flowsheets (Taken 9/26/2023 0830)  Discharge to home or other facility with appropriate resources: Identify barriers to discharge with patient and caregiver   IDT working with patient and family for possible SNF placement at discharge. Problem: Safety - Adult  Goal: Free from fall injury  Outcome: Progressing   Patient continues with impulsive behavior, sitter remains in place. Problem: Skin/Tissue Integrity  Goal: Absence of new skin breakdown  Description: 1. Monitor for areas of redness and/or skin breakdown  2. Assess vascular access sites hourly  3. Every 4-6 hours minimum:  Change oxygen saturation probe site  4. Every 4-6 hours:  If on nasal continuous positive airway pressure, respiratory therapy assess nares and determine need for appliance change or resting period. Outcome: Progressing   No new skin issues noted with skin assessment today, redness continues to aline area. Aline care provided with incontinent episodes and barrier cream applied.

## 2023-09-26 NOTE — PROGRESS NOTES
re: rationale for tx session and goals targeted Educated re: rationale for tx session and goals targeted   Safety Devices: [x] Call light within reach  [x] Chair alarm activated and connected to nurse call light system  [] Bed alarm activated   [] Other: [x] Call light within reach  [] Chair alarm activated and connected to nurse call light system  [x] Bed alarm activated  [] Other:  [x] Call light within reach  [] Chair alarm activated and connected to nurse call light system  [x] Bed alarm activated   [] Other:    Assessment: Mod-severe R MCA syndrome, characterized by impaired attention, L neglect, impulsivity, executive dysfunction, reduced insight. Mild oropharyngeal dysphagia per MBS negatively impacted by feeding behaviors 2/2 cognitive impairment. Plan: Continue as per plan of care. Interventions used this date:  [] Speech/Language Treatment  [] Instruction in HEP  [x] Dysphagia Treatment [x] Cognitive Treatment   [] Other:    Discharge recommendations:  [] Home independently  [] Home with assistance [x]  24 hour supervision  [] ECF [] Other  Continued Tx Upon Discharge: ? [x] Yes    [] No    [] TBD based on progress while on ARU     [] Vital Stim indicated     [] Other:   Estimated discharge date: 9/29/23    Electronically signed by:  RADHA Rincon Speech Language Pathology Graduate Clinician    Lluvia Alfaro M.A., [place]  Speech-Language Pathologist      The speech-language pathologist was present, directed the patient's care, made skilled judgment and was responsible for assessment and treatment.

## 2023-09-26 NOTE — PROGRESS NOTES
Goals : \"get the hell out of here and out of therapy\"  Short Term Goals  Time Frame for Short Term Goals: 14 days( all goals are ongoing)  Short Term Goal 1: Pt will be able to perform bed mobility with max assist of 1 person- goal partially met  Short Term Goal 2: Pt will be able to sit EOB with proper midline positioning for 5 minutes with min A  Short Term Goal 3: Pt will be able to perform a sit pivot to the R via max A  Short Term Goal 4: Pt will be able to propel wheelchair with Mod A with RLE and RUE 20ft    PLAN OF CARE/SAFETY  Physcial Therapy Plan  General Plan: 5-7 times per week  Days Per Week: 5 Days  Current Treatment Recommendations: Strengthening;Balance training;Functional mobility training;Transfer training; Endurance training; Safety education & training; Therapeutic activities; Patient/Caregiver education & training; Wheelchair mobility training;ADL/Self-care training;Neuromuscular re-education  Safety Devices  Type of Devices: Call light within reach;Nurse notified; All fall risk precautions in place; Left in chair;Chair alarm in place;Sitter present    EDUCATION   Education  Education Given To: Patient  Education Provided: Transfer Training;Family Education; Fall Prevention Strategies; Energy Conservation; Safety; Mobility Training  Education Provided Comments: Therapy cont to educate pt on the importance of midline while sudeep standing upright position to increase overall strength and mobility.   Education Method: Demonstration;Verbal  Barriers to Learning: Cognition;Vision  Education Outcome: Continued education needed           Therapy Time   Individual Concurrent Group Co-treatment   Time In        1010   Time Out        1100   Minutes        48      Second Session Therapy Time:   Individual Concurrent Group Co-treatment   Time In  1140         Time Out  1155         Minutes  15           Timed Code Treatment Minutes: 15     Total Treatment Minutes: 50+15=65         Alex Robbins PT, 09/26/23 at 3:23

## 2023-09-27 LAB
ANION GAP SERPL CALCULATED.3IONS-SCNC: 12 MMOL/L (ref 3–16)
BUN SERPL-MCNC: 24 MG/DL (ref 7–20)
CALCIUM SERPL-MCNC: 10.1 MG/DL (ref 8.3–10.6)
CHLORIDE SERPL-SCNC: 106 MMOL/L (ref 99–110)
CO2 SERPL-SCNC: 25 MMOL/L (ref 21–32)
CREAT SERPL-MCNC: 0.7 MG/DL (ref 0.8–1.3)
DEPRECATED RDW RBC AUTO: 12.4 % (ref 12.4–15.4)
GFR SERPLBLD CREATININE-BSD FMLA CKD-EPI: >60 ML/MIN/{1.73_M2}
GLUCOSE SERPL-MCNC: 126 MG/DL (ref 70–99)
HCT VFR BLD AUTO: 37.6 % (ref 40.5–52.5)
HGB BLD-MCNC: 13.3 G/DL (ref 13.5–17.5)
MCH RBC QN AUTO: 30.8 PG (ref 26–34)
MCHC RBC AUTO-ENTMCNC: 35.2 G/DL (ref 31–36)
MCV RBC AUTO: 87.3 FL (ref 80–100)
PLATELET # BLD AUTO: 214 K/UL (ref 135–450)
PMV BLD AUTO: 9.9 FL (ref 5–10.5)
POTASSIUM SERPL-SCNC: 3.9 MMOL/L (ref 3.5–5.1)
RBC # BLD AUTO: 4.31 M/UL (ref 4.2–5.9)
SODIUM SERPL-SCNC: 143 MMOL/L (ref 136–145)
WBC # BLD AUTO: 6.2 K/UL (ref 4–11)

## 2023-09-27 PROCEDURE — 80048 BASIC METABOLIC PNL TOTAL CA: CPT

## 2023-09-27 PROCEDURE — 97112 NEUROMUSCULAR REEDUCATION: CPT | Performed by: PHYSICAL THERAPIST

## 2023-09-27 PROCEDURE — 92526 ORAL FUNCTION THERAPY: CPT

## 2023-09-27 PROCEDURE — 1280000000 HC REHAB R&B

## 2023-09-27 PROCEDURE — 97129 THER IVNTJ 1ST 15 MIN: CPT

## 2023-09-27 PROCEDURE — 97112 NEUROMUSCULAR REEDUCATION: CPT

## 2023-09-27 PROCEDURE — 97535 SELF CARE MNGMENT TRAINING: CPT

## 2023-09-27 PROCEDURE — 36415 COLL VENOUS BLD VENIPUNCTURE: CPT

## 2023-09-27 PROCEDURE — 6370000000 HC RX 637 (ALT 250 FOR IP): Performed by: STUDENT IN AN ORGANIZED HEALTH CARE EDUCATION/TRAINING PROGRAM

## 2023-09-27 PROCEDURE — 97530 THERAPEUTIC ACTIVITIES: CPT

## 2023-09-27 PROCEDURE — 6370000000 HC RX 637 (ALT 250 FOR IP): Performed by: INTERNAL MEDICINE

## 2023-09-27 PROCEDURE — 85027 COMPLETE CBC AUTOMATED: CPT

## 2023-09-27 PROCEDURE — 97530 THERAPEUTIC ACTIVITIES: CPT | Performed by: PHYSICAL THERAPIST

## 2023-09-27 PROCEDURE — 6370000000 HC RX 637 (ALT 250 FOR IP): Performed by: PHYSICAL MEDICINE & REHABILITATION

## 2023-09-27 PROCEDURE — 97130 THER IVNTJ EA ADDL 15 MIN: CPT

## 2023-09-27 RX ADMIN — MONTELUKAST 10 MG: 10 TABLET, FILM COATED ORAL at 22:29

## 2023-09-27 RX ADMIN — NYSTATIN 500000 UNITS: 100000 SUSPENSION ORAL at 09:40

## 2023-09-27 RX ADMIN — TAMSULOSIN HYDROCHLORIDE 0.4 MG: 0.4 CAPSULE ORAL at 09:40

## 2023-09-27 RX ADMIN — Medication 5 MG: at 22:29

## 2023-09-27 RX ADMIN — NYSTATIN 500000 UNITS: 100000 SUSPENSION ORAL at 14:11

## 2023-09-27 RX ADMIN — CETIRIZINE HYDROCHLORIDE 10 MG: 10 TABLET, FILM COATED ORAL at 22:29

## 2023-09-27 RX ADMIN — METHYLPHENIDATE HYDROCHLORIDE 5 MG: 10 TABLET ORAL at 09:40

## 2023-09-27 RX ADMIN — CIPROFLOXACIN 500 MG: 500 TABLET, FILM COATED ORAL at 09:40

## 2023-09-27 RX ADMIN — VERAPAMIL HYDROCHLORIDE 120 MG: 120 TABLET, FILM COATED, EXTENDED RELEASE ORAL at 22:32

## 2023-09-27 RX ADMIN — NYSTATIN 500000 UNITS: 100000 SUSPENSION ORAL at 22:28

## 2023-09-27 RX ADMIN — OXYCODONE HYDROCHLORIDE 5 MG: 5 TABLET ORAL at 22:29

## 2023-09-27 RX ADMIN — Medication 100 MG: at 09:40

## 2023-09-27 RX ADMIN — POTASSIUM BICARBONATE 20 MEQ: 782 TABLET, EFFERVESCENT ORAL at 09:40

## 2023-09-27 RX ADMIN — FLUOXETINE 10 MG: 10 CAPSULE ORAL at 09:43

## 2023-09-27 RX ADMIN — NYSTATIN 500000 UNITS: 100000 SUSPENSION ORAL at 17:17

## 2023-09-27 RX ADMIN — CIPROFLOXACIN 500 MG: 500 TABLET, FILM COATED ORAL at 22:28

## 2023-09-27 RX ADMIN — EZETIMIBE 10 MG: 10 TABLET ORAL at 22:28

## 2023-09-27 ASSESSMENT — PAIN DESCRIPTION - ONSET: ONSET: GRADUAL

## 2023-09-27 ASSESSMENT — PAIN SCALES - GENERAL
PAINLEVEL_OUTOF10: 0
PAINLEVEL_OUTOF10: 7
PAINLEVEL_OUTOF10: 0

## 2023-09-27 ASSESSMENT — PAIN DESCRIPTION - DESCRIPTORS: DESCRIPTORS: ACHING

## 2023-09-27 ASSESSMENT — PAIN DESCRIPTION - ORIENTATION: ORIENTATION: LOWER

## 2023-09-27 ASSESSMENT — PAIN DESCRIPTION - FREQUENCY: FREQUENCY: INTERMITTENT

## 2023-09-27 ASSESSMENT — PAIN - FUNCTIONAL ASSESSMENT: PAIN_FUNCTIONAL_ASSESSMENT: ACTIVITIES ARE NOT PREVENTED

## 2023-09-27 ASSESSMENT — PAIN DESCRIPTION - PAIN TYPE: TYPE: CHRONIC PAIN

## 2023-09-27 ASSESSMENT — PAIN DESCRIPTION - LOCATION: LOCATION: BACK

## 2023-09-27 NOTE — PROGRESS NOTES
Palliative Care Chart Review  and Check in Note:     NAME:  Racquel Price Date: 9/8/2023  Hospital Day:  Hospital Day: 20   Current Code status: Limited    Palliative care is continuing to following Mr. Maren Tristan for symptom management,  and goals of care discussion as needed. Patient's chart reviewed today 9/27/23. The pt is confused, thinks he's making a movie right now. Spoke with his wife at the bedside. She is in agreement with SNF and is concerned about the decision to pursue long term care vs home once he has completed SNF. I encouraged her to explore Medicaid spend down and to call [facility] [employer] to ask questions, which she agreed to do. She asked about hospice and I let her know that hospice could potentially get involved after he has completed SNF. The following are the currently established goals/code status, and Symptom management. Goals of care: Family wants to continue rehab for now.     Code status: DNR/DNI (Limited- no to all interventions)    Discharge plan: d/c to SNF when able    Angie Shanks NP  [address] [facility]

## 2023-09-27 NOTE — PLAN OF CARE
Problem: Nutrition Deficit:  Goal: Optimize nutritional status  Outcome: Not Progressing   Patient continues with decreased appetite, eating less than 25% of meals, encouraging patient to drink ensure. Drank 100% for breakfast, refused at lunch. Problem: Safety - Adult  Goal: Free from fall injury  Outcome: Progressing   1:1 sitter with patient due to impulsive behavior, no falls thus far. Problem: Skin/Tissue Integrity  Goal: Absence of new skin breakdown  Description: 1. Monitor for areas of redness and/or skin breakdown  2. Assess vascular access sites hourly  3. Every 4-6 hours minimum:  Change oxygen saturation probe site  4. Every 4-6 hours:  If on nasal continuous positive airway pressure, respiratory therapy assess nares and determine need for appliance change or resting period. 9/27/2023 1336 by Osmel Sidhu RN  Outcome: Progressing   Skin remains intact, redness noted to scrotum, barrier cream applied.

## 2023-09-27 NOTE — PATIENT CARE CONFERENCE
BMP:   Recent Labs     23  0543      K 3.9      CO2 25   BUN 24*   CREATININE 0.7*     PT/INR: No results for input(s): \"PROTIME\", \"INR\" in the last 72 hours. APTT: No results for input(s): \"APTT\" in the last 72 hours. Liver Profile:  Lab Results   Component Value Date/Time    AST 18 2023 09:03 PM    ALT 22 2023 09:03 PM    BILIDIR <0.2 10/30/2020 10:18 AM    BILITOT <0.2 2023 09:03 PM    ALKPHOS 100 2023 09:03 PM     Lab Results   Component Value Date/Time    CHOL 166 2023 04:44 AM    HDL 38 2023 04:44 AM    HDL 48 2012 09:26 AM    TRIG 100 2023 04:44 AM     Recent Labs     23  0543   WBC 6.2   HGB 13.3*   HCT 37.6*      MCV 87.3     Recent Labs     23  0543      K 3.9      CO2 25   BUN 24*   CREATININE 0.7*     No results for input(s): \"AST\", \"ALT\", \"ALB\", \"BILIDIR\", \"BILITOT\", \"ALKPHOS\" in the last 72 hours. No results for input(s): \"MG\" in the last 72 hours. Recent Labs     23  0543   WBC 6.2   HGB 13.3*   HCT 37.6*        Recent Labs     23  0543      K 3.9      CO2 25   BUN 24*   CREATININE 0.7*   CALCIUM 10.1     No results for input(s): \"AST\", \"ALT\", \"BILIDIR\", \"BILITOT\", \"ALKPHOS\" in the last 72 hours. No results for input(s): \"INR\" in the last 72 hours. No results for input(s): \"CKTOTAL\", \"TROPONINI\" in the last 72 hours. PHYSICAL THERAPY:  Bed Mobility: Scootin Person assistance, Dependent/Total    Transfers:  Sit to Stand: Dependent/Total, 2 Person Assistance  Stand to Sit: 2 Person Assistance, Dependent/Total  Bed to Chair: Dependent/Total, 2 Person Assistance (2-3 via stedy)  Squat Pivot Transfers: Unable to assess    WB Status: pt is non ambulatory and unsafe to assess this date         OCCUPATIONAL THERAPY:  ADL  Grooming: Maximum assistance  Grooming Skilled Clinical Factors: Assist to place toothpaste on toothbrush and bring to mouth.  Pt brushed R side of

## 2023-09-27 NOTE — PROGRESS NOTES
Physical Therapy  Facility/Department: [facility] ACUTE REHAB UNIT  Rehabilitation Physical Therapy Treatment Note    NAME: Saray Mendoza  : 1950 (74 y.o.)  MRN: [id number]  CODE STATUS: Limited    Date of Service: 23       Restrictions:  Restrictions/Precautions: Up as Tolerated, Fall Risk, Modified Diet  Position Activity Restriction  Other position/activity restrictions: up with  assistance     SUBJECTIVE   Pt supine in bed when PT arrived. Pt agreeable to therapy. No c/o pain throughout session     Post Treatment Pain Screening         OBJECTIVE  PT and OT worked collaboratively to utilize the skills of 2 disciplines while maximizing functional mobility to obtain optimal potential.    Cognition  Overall Cognitive Status: Exceptions  Arousal/Alertness: Delayed responses to stimuli;Inconsistent responses to stimuli  Following Commands: Inconsistently follows commands; Follows one step commands with repetition; Follows one step commands with increased time  Attention Span: Difficulty dividing attention; Attends with cues to redirect  Memory: Decreased recall of recent events;Decreased short term memory  Safety Judgement: Decreased awareness of need for safety;Decreased awareness of need for assistance  Problem Solving: Decreased awareness of errors;Assistance required to identify errors made;Assistance required to generate solutions;Assistance required to implement solutions;Assistance required to correct errors made  Insights: Decreased awareness of deficits  Initiation: Requires cues for some  Sequencing: Requires cues for all  Cognition Comment: Pt able to follow simple commands, demo's delayed processing and inconsistent response to more complex instructions. Pt pleasantly confused throughout session w/ nonsensical statements at times.  Pt also more emotionally labile today and tearful at times  Orientation  Overall Orientation Status: Impaired  Orientation Level: Oriented to situation;Oriented to

## 2023-09-27 NOTE — PROGRESS NOTES
swallow without consistent swallow response. There is a mild oral residue of pudding and regular solids primarily at posterior portion and base of tongue. There is adequate soft palate elevation, moderately reduced laryngeal elevation and anterior movement, resulting in reduced laryngeal vestibule closure. There is mild penetration that is self clearing intermittently with thins and deep penetration with trace silent aspiration with thin liquids via sequential cup sips. Cued cough and re-swallow was effective in clearing the laryngeal vestibule of mild residue on underside of epiglottis. There is a mildly reduced tongue base retraction and pharyngeal stripping wave adjacent to the laryngeal vestibule resulting in trace residue of solids after the swallow. Immediately following trials of thin liquids there is a trace pharyngeal residue but due to oral spillover following the swallow and absent sensation from pt, a moderate amount of residue is intermittently noted (dependent on oral retention). Pt was able to utilize cued second swallows that cleared residue (following aspiration event) and subsequent trials cleared pharyngeal residue. Dentition: Adequate  Vision  Vision: Impaired  Vision Exceptions: Wears glasses for reading, Visual field cut  Hearing  Hearing: Within functional limits  Barriers toward progress: Limited insight into deficits      Date: 9/27/2023       Tx session 1 Tx session 2 Tx session 3   Total Timed Code Min 30 30 28   Total Treatment Minutes 30 30 28   Individual Treatment Minutes 30 30 28   Group Treatment Minutes 0 0 0   Co-Treat Minutes 0 0 0   Brief Exception: N/A N/A N/A   Pain None indicated by any means. None indicated. None indicated.     Pain Intervention: [] RN notified  [] Repositioned  [] Intervention offered and patient declined  [x] N/A  [] Other: [] RN notified  [] Repositioned  [] Intervention offered and patient declined  [x] N/A  [] Other:  [] RN notified  []

## 2023-09-27 NOTE — PROGRESS NOTES
Occupational Therapy  Facility/Department: [facility] ACUTE REHAB UNIT  Rehabilitation Occupational Therapy Daily Treatment Note    Date: 23  Patient Name: Madeleine Urbina       Room: 3109/3109-01  MRN: [id number]  Account: [de-identified]   : 1950  (74 y.o.) Gender: male                    Past Medical History:  has a past medical history of Angioedema, ED (erectile dysfunction), History of colon polyps, History of Aj-Barr virus infection, History of rectal polyps, Idiopathic urticaria, MI (myocardial infarction) ([address]), Osteoarthritis, RA (rheumatoid arthritis) ([address]), Tear of medial cartilage or meniscus of knee, current, and Unspecified essential hypertension. Past Surgical History:   has a past surgical history that includes Cardiac surgery; other surgical history (2018); hip surgery; Coronary angioplasty with stent (); pr egd transoral biopsy single/multiple (N/A, 2018); Colonoscopy (N/A, 2018); Appendectomy; pr dstrj lesion anus simple surg excision (N/A, 10/9/2018); pr colonoscopy flx dx w/collj spec when pfrmd (N/A, 10/9/2018); Colonoscopy (N/A, 2019); Knee arthroscopy (Bilateral); and Colonoscopy (N/A, 3/5/2020). Restrictions  Restrictions/Precautions: Up as Tolerated, Fall Risk, Modified Diet  Other position/activity restrictions: up with  assistance    Subjective  Subjective: Pt seim-supine in bed upon arrival, pleasantly confused and agreeable to therapy, denied pain. Sitter present in hallway. Co-tx indicated to maximize safety and therapeutic potential.  Restrictions/Precautions: Up as Tolerated; Fall Risk;Modified Diet             Objective     Cognition  Overall Cognitive Status: Exceptions  Arousal/Alertness: Delayed responses to stimuli;Inconsistent responses to stimuli  Following Commands: Inconsistently follows commands; Follows one step commands with repetition; Follows one step commands with increased time  Attention Span: Difficulty dividing attention; Attends with

## 2023-09-27 NOTE — PLAN OF CARE
Problem: Skin/Tissue Integrity  Goal: Absence of new skin breakdown  Description: 1. Monitor for areas of redness and/or skin breakdown  2. Assess vascular access sites hourly  3. Every 4-6 hours minimum:  Change oxygen saturation probe site  4. Every 4-6 hours:  If on nasal continuous positive airway pressure, respiratory therapy assess nares and determine need for appliance change or resting period. Note: Incontinent of bowel and bladder. Checks and change Q 2 hours and PRN. Incontinent wipes used. Problem: Pain  Goal: Verbalizes/displays adequate comfort level or baseline comfort level  Verbalizes/displays adequate comfort level or baseline comfort level: Encourage patient to monitor pain and request assistance  Note: C/O back pain. Medicated with PRN Oxycodone with effective results.

## 2023-09-28 PROCEDURE — 92526 ORAL FUNCTION THERAPY: CPT

## 2023-09-28 PROCEDURE — 97530 THERAPEUTIC ACTIVITIES: CPT

## 2023-09-28 PROCEDURE — 6370000000 HC RX 637 (ALT 250 FOR IP): Performed by: PHYSICAL MEDICINE & REHABILITATION

## 2023-09-28 PROCEDURE — 6370000000 HC RX 637 (ALT 250 FOR IP): Performed by: INTERNAL MEDICINE

## 2023-09-28 PROCEDURE — 97535 SELF CARE MNGMENT TRAINING: CPT

## 2023-09-28 PROCEDURE — 97130 THER IVNTJ EA ADDL 15 MIN: CPT

## 2023-09-28 PROCEDURE — 6370000000 HC RX 637 (ALT 250 FOR IP): Performed by: STUDENT IN AN ORGANIZED HEALTH CARE EDUCATION/TRAINING PROGRAM

## 2023-09-28 PROCEDURE — 97129 THER IVNTJ 1ST 15 MIN: CPT

## 2023-09-28 PROCEDURE — 97530 THERAPEUTIC ACTIVITIES: CPT | Performed by: PHYSICAL THERAPIST

## 2023-09-28 PROCEDURE — 1280000000 HC REHAB R&B

## 2023-09-28 RX ORDER — OXYCODONE HYDROCHLORIDE 5 MG/1
5 TABLET ORAL EVERY 6 HOURS PRN
Qty: 20 TABLET | Refills: 0 | Status: SHIPPED | OUTPATIENT
Start: 2023-09-28 | End: 2023-09-29 | Stop reason: SDUPTHER

## 2023-09-28 RX ORDER — METHYLPHENIDATE HYDROCHLORIDE 5 MG/1
5 TABLET ORAL EVERY MORNING
Qty: 30 TABLET | Refills: 0 | Status: SHIPPED | OUTPATIENT
Start: 2023-09-29 | End: 2023-09-29 | Stop reason: SDUPTHER

## 2023-09-28 RX ORDER — POLYETHYLENE GLYCOL 3350 17 G/17G
17 POWDER, FOR SOLUTION ORAL DAILY PRN
Qty: 30 PACKET | Refills: 0
Start: 2023-09-28 | End: 2023-10-28

## 2023-09-28 RX ORDER — CIPROFLOXACIN 500 MG/1
500 TABLET, FILM COATED ORAL EVERY 12 HOURS SCHEDULED
Qty: 7 TABLET | Refills: 0
Start: 2023-09-28 | End: 2023-10-02

## 2023-09-28 RX ORDER — EZETIMIBE 10 MG/1
10 TABLET ORAL NIGHTLY
Qty: 30 TABLET | Refills: 3 | Status: SHIPPED | OUTPATIENT
Start: 2023-09-28

## 2023-09-28 RX ORDER — THIAMINE MONONITRATE (VIT B1) 100 MG
100 TABLET ORAL DAILY
Qty: 30 TABLET | Refills: 0
Start: 2023-09-29

## 2023-09-28 RX ORDER — FLUOXETINE 10 MG/1
10 CAPSULE ORAL DAILY
Qty: 30 CAPSULE | Refills: 3
Start: 2023-09-29

## 2023-09-28 RX ORDER — HYDRALAZINE HYDROCHLORIDE 10 MG/1
10 TABLET, FILM COATED ORAL EVERY 6 HOURS PRN
Status: DISCONTINUED | OUTPATIENT
Start: 2023-09-28 | End: 2023-09-29 | Stop reason: HOSPADM

## 2023-09-28 RX ADMIN — NYSTATIN 500000 UNITS: 100000 SUSPENSION ORAL at 20:30

## 2023-09-28 RX ADMIN — TAMSULOSIN HYDROCHLORIDE 0.4 MG: 0.4 CAPSULE ORAL at 08:49

## 2023-09-28 RX ADMIN — POTASSIUM BICARBONATE 20 MEQ: 782 TABLET, EFFERVESCENT ORAL at 08:52

## 2023-09-28 RX ADMIN — CIPROFLOXACIN 500 MG: 500 TABLET, FILM COATED ORAL at 20:31

## 2023-09-28 RX ADMIN — METHYLPHENIDATE HYDROCHLORIDE 5 MG: 10 TABLET ORAL at 08:50

## 2023-09-28 RX ADMIN — Medication 100 MG: at 08:49

## 2023-09-28 RX ADMIN — MONTELUKAST 10 MG: 10 TABLET, FILM COATED ORAL at 20:31

## 2023-09-28 RX ADMIN — EZETIMIBE 10 MG: 10 TABLET ORAL at 20:31

## 2023-09-28 RX ADMIN — NYSTATIN 500000 UNITS: 100000 SUSPENSION ORAL at 08:52

## 2023-09-28 RX ADMIN — CETIRIZINE HYDROCHLORIDE 10 MG: 10 TABLET, FILM COATED ORAL at 20:31

## 2023-09-28 RX ADMIN — Medication 5 MG: at 20:31

## 2023-09-28 RX ADMIN — HYDRALAZINE HYDROCHLORIDE 10 MG: 10 TABLET, FILM COATED ORAL at 10:07

## 2023-09-28 RX ADMIN — VERAPAMIL HYDROCHLORIDE 120 MG: 120 TABLET, FILM COATED, EXTENDED RELEASE ORAL at 20:30

## 2023-09-28 RX ADMIN — FLUOXETINE 10 MG: 10 CAPSULE ORAL at 08:49

## 2023-09-28 RX ADMIN — CIPROFLOXACIN 500 MG: 500 TABLET, FILM COATED ORAL at 08:49

## 2023-09-28 RX ADMIN — NYSTATIN 500000 UNITS: 100000 SUSPENSION ORAL at 18:27

## 2023-09-28 ASSESSMENT — PAIN SCALES - WONG BAKER
WONGBAKER_NUMERICALRESPONSE: 0

## 2023-09-28 NOTE — CARE COORDINATION
Patient has been denied at every SNF referrals were sent to due to patient having a sitter. Additional referrals sent to SNF's with memory care units who should have the staff and abilities to manage patient's needs. [facility] [place] [address] [facility]. SW spoke with spouse in regards to paying for LTC as many of the facilities denying patient are recommending more LTC needs. Spouse not in agreement with LTC at this moment and would like to hear from other reviewing facilities. SW following and is in contact with facilities reviewing patient's potential admission.      Electronically signed by MILADY Street on 9/28/2023 at 9:41 AM

## 2023-09-28 NOTE — PROGRESS NOTES
ARU Discharge Assessment    Transportation  \"Has lack of transportation kept you from medical appointments, meetings, work, or from getting things needed for daily living? \"Check all that apply:  [] A.  Yes, it has kept me from medical appointments or from getting my medications  [] B.  Yes, it has kept me from non-medical meetings, appointments, work, or from getting things that I need  [x] C.  No  [] X. Patient unable to respond  [] Y. Patient declines to respond    Provision of Current Reconciled Medication List to Subsequent Provider at Discharge  [] No, current reconciled medication list not provided to the subsequent provider. [x] Yes, current reconciled medication list provided to the subsequent provider. (**Select route of transmission below**)   [] Via Electronic Health Record   [x] [facility]  [] Verbal (e.g. in person, telephone, video conferencing)  [x] Paper-based (e.g. fax, copies, printouts)   [] Other Methods (e.g. texting, email, CDs)    Provision of Current Reconciled Medication List to Patient at Discharge  [] No, current reconciled medication list not provided to the patient, family and/or caregiver. [x] Yes, current reconciled medication list provided to the patient, family and/or caregiver.  (**Select route of transmission below**)   [x] Via Electronic Health Record (e.g., electronic access to patient portal)   [] Via [employer]  [x] Verbal (e.g. in person, telephone, video conferencing)  [x] Paper-based (e.g. fax, copies, printouts)   [] Other Methods (e.g. texting, email, CDs)    Health Literacy  \"How often do you need to have someone help you when you read instructions, pamphlets, or other written material from your doctor or pharmacy? \"  []  0. Never  []  1. Rarely  [x]  2. Sometimes  []  3. Often  []  4. Always  []  7. Patient declines to respond  []  8.   Patient unable to respond    BIMS - **Must be completed in the

## 2023-09-28 NOTE — PLAN OF CARE
Problem: Discharge Planning  Goal: Discharge to home or other facility with appropriate resources  Discharge to home or other facility with appropriate resources: Identify barriers to discharge with patient and caregiver     Problem: Pain  Goal: Verbalizes/displays adequate comfort level or baseline comfort level  Flowsheets (Taken 9/27/2023 0930 by Cyrus Cox RN)  Verbalizes/displays adequate comfort level or baseline comfort level: Encourage patient to monitor pain and request assistance  Note: C/O back pain. PRN Oxycodone effective. Problem: Skin/Tissue Integrity  Goal: Absence of new skin breakdown  Description: 1. Monitor for areas of redness and/or skin breakdown  2. Assess vascular access sites hourly  3. Every 4-6 hours minimum:  Change oxygen saturation probe site  4. Every 4-6 hours:  If on nasal continuous positive airway pressure, respiratory therapy assess nares and determine need for appliance change or resting period.   Outcome: Progressing     Problem: Nutrition Deficit:  Goal: Optimize nutritional status  9/28/2023 0250 by Emma Valdes RN  Nutrient intake appropriate for improving, restoring, or maintaining nutritional needs:   Assess nutritional status and recommend course of action   Recommend appropriate diets, oral nutritional supplements, and vitamin/mineral supplements   Monitor oral intake, labs, and treatment plans

## 2023-09-28 NOTE — DISCHARGE INSTR - COC
Continuity of Care Form    Patient Name: Janae Jones   :  1950  MRN:  [id number]    [address] date:  2023  Discharge date:  2023    Code Status Order: Limited   Advance Directives:     Admitting Physician:  Isabelle Cox MD  PCP: Carly Gonzalez, APRN - CNP    Discharging Nurse: [facility] Unit/Room#: 3109/3109-01  Discharging Unit Phone Number: [phone]    Emergency Contact:   Extended Emergency Contact Information  Primary Emergency Contact: [facility]  Address: [address] [place] [address], [address] [place] [address] [place] Phone: [phone]  Mobile Phone: [phone]  Relation: Spouse  Secondary Emergency Contact: [facility]  Mobile Phone: [phone]  Relation: Child    Past Surgical History:  Past Surgical History:   Procedure Laterality Date    APPENDECTOMY      CARDIAC SURGERY      cardiac stents x2    COLONOSCOPY N/A 2018    COLONOSCOPY WITH BIOPSY OF RECTAL MASS performed by Jose Chicas MD at [address] N/A 2019    COLONOSCOPY POLYPECTOMY hot snare of transverse colon polyp and proximal rectal polyp  performed by Anushka Randolph MD at [address] N/A 3/5/2020    COLONOSCOPY POLYPECTOMY ABLATION performed by Anushka Randolph MD at [address] [facility] [address]  2017    HIP SURGERY      KNEE ARTHROSCOPY Bilateral     OTHER SURGICAL HISTORY  2018    LEFT ANTERIOR HIP ARTHROPLASTY MAKOPLASTY    NC COLONOSCOPY FLX DX W/COLLJ SPEC WHEN PFRMD N/A 10/9/2018    COLONOSCOPY WITH COLD FORCEP POLYPECTOMY performed by Guillermina Hutchison MD at [address] N/A 10/9/2018    TRANSANAL EXCISION OF RECTAL POLYP, TRANSANAL MINIMALLY INVASIVE SURGERY (TAMIS); EXCISION RECTAL MASS performed by Guillermina Hutchison MD at [address] [facility] EGD TRANSORAL BIOPSY SINGLE/MULTIPLE N/A 2018    EGD

## 2023-09-28 NOTE — PROGRESS NOTES
shirt off over head and to unthread from LLE; pt req assist to thread LUE and pull up to elbow, assist to pull down in back and to sequence all steps; + time for pt to initiate  Lower Extremity Dressing  Assistance Level: Maximum assistance; Increased time to complete;Verbal cues; Set-up  Skilled Clinical Factors: while supine in bed pt req assist to thread LLE due to hip pain, pt able to thread RLE via figure 4, pt attempted to manage briefs over hips via bridging but only able to minimally clear buttocks; pt completed rolling bilaterally with assist to pull up on both sides  Putting On/Taking Off Footwear  Assistance Level: Maximum assistance  Skilled Clinical Factors: pt dep to don slip on shoes and doff socks; pt able to kick of freddy shoes with + time  Toileting  Assistance Level: Dependent; Requires x 2 assistance  Skilled Clinical Factors: pt incontinent of bowel; perihygiene seated on shower chair; dep to doff briefs  Tub/Shower Transfers  Type: Shower  Transfer From: Bed  Transfer To:  (rolling shower chair)  Assistance Level: Requires x 2 assistance;Dependent  Skilled Clinical Factors: Max A x 1 + Mod A x 1 leading to L EOB<rolling shower chair; Max A x 2 leading to R to EOB; VCs to sequence and for anterior lean, pt with significant pushing req VCs for placement of RUE          Bridging  Assistance Level: Minimal assistance  Skilled Clinical Factors: assist for positioning of LLE  Roll Left  Assistance Level: Minimal assistance  Skilled Clinical Factors: HOB flat, use of bedrail, increased time and step by step VC for sequencing  Roll Right  Assistance Level:  Moderate assistance  Skilled Clinical Factors: HOB flat, assist to position BLEs, use of bedrail, increased time and step by step VC for sequencing  Sit to Supine  Assistance Level: Requires x 2 assistance  Skilled Clinical Factors: after bathing pt demo significant fatigue req assist for trunk and BLE mgmt; cues to sequence  Supine to Sit  Skilled

## 2023-09-28 NOTE — PROGRESS NOTES
ACUTE REHAB UNIT  SPEECH/LANGUAGE PATHOLOGY      [x] Daily  [x] Weekly Care Conference Note  [x] Discharge    Patient:Grady Viramontes      [facility]:[id number]  Rehab Dx/Hx: Acute CVA (cerebrovascular accident) ([address]) [I63.9]    Precautions: [x] Aspiration  [x] Fall risk  [] Sternal  [] Seizure [] Hip  [] Weight Bearing [x] Other: delirium precautions  ST Dx: [] Aphasia  [] Dysarthria  [] Apraxia   [x] Oropharyngeal dysphagia [x] Cognitive Impairment  [] Other:   Date of Admit: 2023  Room #: 3109/3109-01  Date: 2023          Current Diet Order:ADULT DIET; Dysphagia - Soft and Bite Sized, Thin liquids with single sip and double hard swallow  ADULT ORAL NUTRITION SUPPLEMENT; Breakfast, Lunch, Dinner; Standard High Calorie/High Protein Oral Supplement   Recommended Form of Meds: PO  Compensatory Swallowing Strategies : Alternate solids and liquids, Remain upright for 30-45 minutes after meals, Upright as possible for all oral intake, Small bites/sips, Lingual sweep (Single sips thin liquids, double hard swallow for liquids), single sip, double hard swallows  Lives With: Spouse  Occupation: Retired  Type of Occupation:  for [employer]. Impressions 2023:  Pt presents with perioral weakness resulting in escape of liquids beyond mid-chin without apparent sensation (can not rule out contribution of facial hair), reduced bolus cohesion with loss to floor of the mouth with partial recollection anteriorly, bolus prep and mastication is timely and efficient with rotary pattern noted, brisk tongue movement for bolus transport with minimal lingual pumping. Significantly increased reduced bolus control with thin liquids with half of bolus loss to floor of mouth with retention. There is reduced coordination of straw use with pt attempt to propel bolus to pharynx while accepting more liquid via straw.  Oral residue is moderate with thin liquids with spillover deep into the pharynx after the

## 2023-09-28 NOTE — PROGRESS NOTES
increase overall strength and mobility in an energy efficient manner  Education Method: Demonstration;Verbal  Barriers to Learning: Cognition;Vision  Education Outcome: Continued education needed        Therapy Time   Individual Concurrent Group Co-treatment   Time In       1315   Time Out       [address] [place] [address], 09/28/23 at 3:07 PM

## 2023-09-28 NOTE — PROGRESS NOTES
Physical Therapy  Facility/Department: [facility] UNIT  Rehabilitation Physical Therapy Treatment Note/ Discharge summary    NAME: Melida Mota  : 1950 (74 y.o.)  MRN: [id number]  CODE STATUS: Limited    Date of Service: 23       Restrictions:  Restrictions/Precautions: Up as Tolerated, Fall Risk, Modified Diet  Position Activity Restriction  Other position/activity restrictions: up with  assistance     SUBJECTIVE  Subjective  Subjective: Pt positioned L sidelying  in bed upon PT arrival. Pt agreeable to therapy. Pain: Pt c/o L hip pain with certain movements. Pts tendency is to lean to the L and appears to have the majority of the weight on L hip        Post Treatment Pain Screening         OBJECTIVE  PT and OT worked collaboratively to utilize the skills of 2 disciplines while maximizing functional mobility to obtain optimal potential.. Therapy assisted pt with a shower in the rolling shower chair. Refer to OT for bathing and dressing status. Cognition  Overall Cognitive Status: Exceptions  Arousal/Alertness: Delayed responses to stimuli;Inconsistent responses to stimuli  Following Commands: Inconsistently follows commands; Follows one step commands with repetition; Follows one step commands with increased time  Attention Span: Difficulty dividing attention; Attends with cues to redirect  Memory: Decreased recall of recent events;Decreased short term memory  Safety Judgement: Decreased awareness of need for safety;Decreased awareness of need for assistance  Problem Solving: Decreased awareness of errors;Assistance required to identify errors made;Assistance required to generate solutions;Assistance required to implement solutions;Assistance required to correct errors made  Insights: Decreased awareness of deficits  Initiation: Requires cues for some  Sequencing: Requires cues for all  Cognition Comment: Pt able to follow simple commands, demo's delayed processing and inconsistent response to

## 2023-09-28 NOTE — PROGRESS NOTES
Comprehensive Nutrition Assessment    RECOMMENDATIONS:  PO Diet: Dysphagia- soft & bite sized  ONS: Ensure +HP TID & Magic Cup daily   Nutrition Education: Education not indicated     NUTRITION ASSESSMENT:   Nutritional summary & status: Follow up. Pt continues on a dysphagia- soft & bite sized diet w/ meal intakes continuing to be inadequate in meeting EEN @ <25%. Pt is receiving multiple supplements w/ Ensure +HP in place TID w/ meals in addition to magic cup daily. With all interventions in place, suspect pt to be meeting 50% of EEN. Goal of 75% of EEN prior to d/c. Would rec'd continuing POC at this time & continuing to encourage optimal intakes. Noted pt is pending d/c; however, SNF's have declined d/t pt still requiring a sitter. Pt is having regular BM's on admit. Labs reviewed. Will continue to monitor pt per [facility] through remainder of stay; RD following. Admission // PMH: PMHx of recent right embolic CVA, right carotid artery stenosis, HTN, HLD, CAD s/p PCI, prostate cancer s/p radiation and currently on Leupron hormonal therapy    MALNUTRITION ASSESSMENT  Context of Malnutrition: Acute Illness   Malnutrition Status:  At risk for malnutrition (Comment)  Findings of the 6 clinical characteristics of malnutrition (Minimum of 2 out of 6 clinical characteristics is required to make the diagnosis of moderate or severe Protein Calorie Malnutrition based on AND/ASPEN Guidelines):  Energy Intake:  Mild decrease in energy intake (Comment)  Weight Loss:  No significant weight loss     Body Fat Loss:  No significant body fat loss     Muscle Mass Loss:  No significant muscle mass loss    Fluid Accumulation:  No significant fluid accumulation     Strength:  Not Performed    NUTRITION DIAGNOSIS   Inadequate oral intake related to swallowing difficulty, cognitive or neurological impairment as evidenced by intake 0-25%    Nutrition Monitoring and Evaluation:   Food/Nutrient Intake Outcomes:  Food and Nutrient Intake,

## 2023-09-29 VITALS
HEIGHT: 71 IN | BODY MASS INDEX: 24.23 KG/M2 | OXYGEN SATURATION: 36 % | RESPIRATION RATE: 16 BRPM | WEIGHT: 173.06 LBS | DIASTOLIC BLOOD PRESSURE: 83 MMHG | SYSTOLIC BLOOD PRESSURE: 150 MMHG | HEART RATE: 83 BPM | TEMPERATURE: 98.8 F

## 2023-09-29 LAB
ANION GAP SERPL CALCULATED.3IONS-SCNC: 14 MMOL/L (ref 3–16)
BUN SERPL-MCNC: 23 MG/DL (ref 7–20)
CALCIUM SERPL-MCNC: 10 MG/DL (ref 8.3–10.6)
CHLORIDE SERPL-SCNC: 106 MMOL/L (ref 99–110)
CO2 SERPL-SCNC: 21 MMOL/L (ref 21–32)
CREAT SERPL-MCNC: 0.6 MG/DL (ref 0.8–1.3)
DEPRECATED RDW RBC AUTO: 12.5 % (ref 12.4–15.4)
GFR SERPLBLD CREATININE-BSD FMLA CKD-EPI: >60 ML/MIN/{1.73_M2}
GLUCOSE SERPL-MCNC: 140 MG/DL (ref 70–99)
HCT VFR BLD AUTO: 39.4 % (ref 40.5–52.5)
HGB BLD-MCNC: 13.7 G/DL (ref 13.5–17.5)
MCH RBC QN AUTO: 30.3 PG (ref 26–34)
MCHC RBC AUTO-ENTMCNC: 34.9 G/DL (ref 31–36)
MCV RBC AUTO: 86.7 FL (ref 80–100)
PLATELET # BLD AUTO: 195 K/UL (ref 135–450)
PMV BLD AUTO: 9.3 FL (ref 5–10.5)
POTASSIUM SERPL-SCNC: 4.1 MMOL/L (ref 3.5–5.1)
RBC # BLD AUTO: 4.54 M/UL (ref 4.2–5.9)
SODIUM SERPL-SCNC: 141 MMOL/L (ref 136–145)
WBC # BLD AUTO: 7.9 K/UL (ref 4–11)

## 2023-09-29 PROCEDURE — 80048 BASIC METABOLIC PNL TOTAL CA: CPT

## 2023-09-29 PROCEDURE — 97542 WHEELCHAIR MNGMENT TRAINING: CPT | Performed by: PHYSICAL THERAPIST

## 2023-09-29 PROCEDURE — 36415 COLL VENOUS BLD VENIPUNCTURE: CPT

## 2023-09-29 PROCEDURE — 97535 SELF CARE MNGMENT TRAINING: CPT

## 2023-09-29 PROCEDURE — 97129 THER IVNTJ 1ST 15 MIN: CPT

## 2023-09-29 PROCEDURE — 6370000000 HC RX 637 (ALT 250 FOR IP): Performed by: STUDENT IN AN ORGANIZED HEALTH CARE EDUCATION/TRAINING PROGRAM

## 2023-09-29 PROCEDURE — 85027 COMPLETE CBC AUTOMATED: CPT

## 2023-09-29 PROCEDURE — 6370000000 HC RX 637 (ALT 250 FOR IP): Performed by: PHYSICAL MEDICINE & REHABILITATION

## 2023-09-29 PROCEDURE — 92526 ORAL FUNCTION THERAPY: CPT

## 2023-09-29 PROCEDURE — 97130 THER IVNTJ EA ADDL 15 MIN: CPT

## 2023-09-29 PROCEDURE — 6370000000 HC RX 637 (ALT 250 FOR IP): Performed by: INTERNAL MEDICINE

## 2023-09-29 PROCEDURE — 97530 THERAPEUTIC ACTIVITIES: CPT

## 2023-09-29 PROCEDURE — 97530 THERAPEUTIC ACTIVITIES: CPT | Performed by: PHYSICAL THERAPIST

## 2023-09-29 RX ORDER — METHYLPHENIDATE HYDROCHLORIDE 5 MG/1
5 TABLET ORAL EVERY MORNING
Qty: 30 TABLET | Refills: 0 | Status: SHIPPED | OUTPATIENT
Start: 2023-09-29 | End: 2023-10-29

## 2023-09-29 RX ORDER — OXYCODONE HYDROCHLORIDE 5 MG/1
5 TABLET ORAL EVERY 6 HOURS PRN
Qty: 20 TABLET | Refills: 0 | Status: SHIPPED | OUTPATIENT
Start: 2023-09-29 | End: 2023-10-04

## 2023-09-29 RX ADMIN — NYSTATIN 500000 UNITS: 100000 SUSPENSION ORAL at 08:52

## 2023-09-29 RX ADMIN — POTASSIUM BICARBONATE 20 MEQ: 782 TABLET, EFFERVESCENT ORAL at 08:51

## 2023-09-29 RX ADMIN — TAMSULOSIN HYDROCHLORIDE 0.4 MG: 0.4 CAPSULE ORAL at 08:51

## 2023-09-29 RX ADMIN — Medication 100 MG: at 08:52

## 2023-09-29 RX ADMIN — FLUOXETINE 10 MG: 10 CAPSULE ORAL at 08:51

## 2023-09-29 RX ADMIN — METHYLPHENIDATE HYDROCHLORIDE 5 MG: 10 TABLET ORAL at 08:51

## 2023-09-29 RX ADMIN — NYSTATIN 500000 UNITS: 100000 SUSPENSION ORAL at 13:19

## 2023-09-29 RX ADMIN — CIPROFLOXACIN 500 MG: 500 TABLET, FILM COATED ORAL at 08:51

## 2023-09-29 ASSESSMENT — PAIN SCALES - GENERAL: PAINLEVEL_OUTOF10: 0

## 2023-09-29 NOTE — DISCHARGE SUMMARY
Physical Medicine & Rehabilitation  Discharge Summary     Patient Identification:  Janae Jones  : 1950  Admit date: 2023  Discharge date:   23  Attending provider: Isabelle Cox MD        Primary care provider: Carly Gonzalez, APRN - CNP     Discharge Diagnoses:   Patient Active Problem List   Diagnosis    Idiopathic urticaria    Primary hypertension    ED (erectile dysfunction)    Hyperlipidemia    Low testosterone    Hyperglycemia    Obesity (BMI 30-39. 9)    Arthritis    Primary osteoarthritis of left hip    Osteoarthritis of left hip    Status post total hip replacement, left    Rectal bleeding    Coronary artery disease due to lipid rich plaque    Chronic congestive heart failure (HCC)    ECG abnormal    Rectal mass    Rectal polyp    Adenomatous polyp of ascending colon    Acute right MCA stroke (HCC)    Acute CVA (cerebrovascular accident) [facility])       Discharge Functional Status:      Physical therapy:  Supine to Sit: Partial/moderate assistance  Sit to Supine: Dependent      Sit to Stand: Dependent  Chair/Bed to Chair Transfer: Dependent  Car Transfer:       Ambulation 10 ft:    Ambulation 50 ft:    Ambulation 150 ft:    Stairs - 1 Step:    Stairs - 4 Step:    Stairs - 12 Step:      Occupational therapy:  Eating: Supervision or touching assistance  Oral Hygiene: Supervision or touching assistance  Bathing: Substantial/maximal assistance  Upper Body Dressing: Substantial/maximal assistance  Lower Body Dressing: Substantial/maximal assistance     Toilet Transfer: Dependent  Toilet Hygiene: Dependent    Speech therapy:    Pt presents with mod-severe cognitive-lingustic impairment characterized by significantly decreased insight and increased impulsivity. Pt was disoriented to date (stating 1985) as well as to location (stated [facility]) and situation. Pt appeared confused and was observed to give different answers to the same questions asked again 10 minutes later.

## 2023-09-29 NOTE — PLAN OF CARE
Problem: Discharge Planning  Goal: Discharge to home or other facility with appropriate resources  9/29/2023 0249 by Quynh Moore RN  Outcome: Progressing  Flowsheets (Taken 9/29/2023 0249)  Discharge to home or other facility with appropriate resources:   Identify barriers to discharge with patient and caregiver   Identify discharge learning needs (meds, wound care, etc)     Problem: Safety - Adult  Goal: Free from fall injury  9/29/2023 0249 by Quynh Moore RN  Outcome: Progressing  Flowsheets (Taken 9/29/2023 0249)  Free From Fall Injury: Instruct family/caregiver on patient safety  Note: Patient is alert. He is oriented to person and situation. [facility] but stated it was in [place]. Safety precautions in place. Bed is in low and locked position. Bed alarm set. [employer] camera in use. Frequent visual checks made. Problem: Skin/Tissue Integrity  Goal: Absence of new skin breakdown  9/29/2023 0249 by Quynh Moore RN  Outcome: Progressing  Note: Skin warm and dry. Su area slightly reddened due to incontinence. Low air loss bed in use. Frequent turning and repositioning done.       Problem: Pain  Goal: Verbalizes/displays adequate comfort level or baseline comfort level  Outcome: Progressing  Flowsheets (Taken 9/29/2023 0249)  Verbalizes/displays adequate comfort level or baseline comfort level:   Encourage patient to monitor pain and request assistance   Assess pain using appropriate pain scale   Administer analgesics based on type and severity of pain and evaluate response   Implement non-pharmacological measures as appropriate and evaluate response

## 2023-09-29 NOTE — CARE COORDINATION
Case Management Assessment            Discharge Note                    Date / Time of Note: 9/29/2023 11:42 AM                  Discharge Note Completed by: MILADY Costa    Patient Name: Wil Saleh   YOB: 1950  Diagnosis: Acute CVA (cerebrovascular accident) [facility]) [I63.9]   Date / Time: 9/8/2023  4:56 PM    Current PCP: FATIMAH Rodriguez CNP  Clinic patient: No    Hospitalization in the last 30 days: Yes       Advance Directives:  Code Status: [facility] DNR form completed and on chart: Not Indicated    Financial:  Payor: MEDICARE / Plan: MEDICARE PART A AND B / Product Type: *No Product type* /      Pharmacy:    [facility], [address]  [address]  Phone: [phone] Fax: [phone]      Assistance purchasing medications?: Potential Assistance Purchasing Medications: No  Assistance provided by Case Management: None at this time    Does patient want to participate in local refill/ meds to beds program?: Yes    Meds To Beds General Rules:  1. Can ONLY be done Monday- Friday between 8:30am-5pm  2. Prescription(s) must be in pharmacy by 3pm to be filled same day  3. Copy of patient's insurance/ prescription drug card and patient face sheet must be sent along with the prescription(s)  4. Cost of Rx cannot be added to hospital bill. If financial assistance is needed, please contact unit  or ;  or  CANNOT provide pharmacy voucher for patients co-pays  5.  Patients can then  the prescription on their way out of the hospital at discharge, or pharmacy can deliver to the bedside if staff is available. (payment due at time of pick-up or delivery - cash, check, or card accepted)     Able to afford home medications/ co-pay costs: Yes    ADLS:  Current PT AM-PAC Score:   /24  Current OT AM-PAC Score:   /24      Discharging to Facility/

## 2023-09-29 NOTE — PLAN OF CARE
Patient discharging today to facility that can meet needs. Problem: Discharge Planning  Goal: Discharge to home or other facility with appropriate resources  9/29/2023 1417 by Van Garza RN  Outcome: Adequate for Discharge     Problem: Safety - Adult  Goal: Free from fall injury  9/29/2023 1417 by Van Garza RN  Outcome: Adequate for Discharge     Problem: Skin/Tissue Integrity  Goal: Absence of new skin breakdown  Description: 1. Monitor for areas of redness and/or skin breakdown  2. Assess vascular access sites hourly  3. Every 4-6 hours minimum:  Change oxygen saturation probe site  4. Every 4-6 hours:  If on nasal continuous positive airway pressure, respiratory therapy assess nares and determine need for appliance change or resting period. 9/29/2023 1417 by Van Garza RN  Outcome: Adequate for Discharge     Problem: ABCDS Injury Assessment  Goal: Absence of physical injury  Outcome: Adequate for Discharge     Problem: Pain  Goal: Verbalizes/displays adequate comfort level or baseline comfort level  9/29/2023 1417 by Van Garza RN  Outcome: Adequate for Discharge     Problem: Chronic Conditions and Co-morbidities  Goal: Patient's chronic conditions and co-morbidity symptoms are monitored and maintained or improved  Outcome: Adequate for Discharge     Problem: Nutrition Deficit:  Goal: Optimize nutritional status  Outcome: Adequate for Discharge     Problem: Confusion  Goal: Confusion, delirium, dementia, or psychosis is improved or at baseline  Description: INTERVENTIONS:  1. Assess for possible contributors to thought disturbance, including medications, impaired vision or hearing, underlying metabolic abnormalities, dehydration, psychiatric diagnoses, and notify attending LIP  2. Sebastian high risk fall precautions, as indicated  3. Provide frequent short contacts to provide reality reorientation, refocusing and direction  4.  Decrease environmental stimuli, including noise as

## 2023-09-29 NOTE — PROGRESS NOTES
Patient alert and oriented to self, intermittently to situation and place, disoriented to time, VSS, shift assessment done as charted. Patient set to be picked up this afternoon about 4:00pm. Staff checking on patient frequently to avoid impulsive behavior and repositioning when patient slides down in bed and especially over to left side Patient also has intermittent severe confusion. Education and care plans complete, LIVE will be sent with patient to facility. Called for report, clarification needed for orders for plavix and asprin.  Contacted Dr. Domitila Shepherd for clarification, will put in [facility]

## 2023-09-29 NOTE — PLAN OF CARE
Problem: Discharge Planning  Goal: Discharge to home or other facility with appropriate resources  9/29/2023 1522 by Ara Lay RN  Outcome: Adequate for Discharge     Problem: Safety - Adult  Goal: Free from fall injury  9/29/2023 1522 by Ara Lay RN  Outcome: Adequate for Discharge     Problem: Skin/Tissue Integrity  Goal: Absence of new skin breakdown  Description: 1. Monitor for areas of redness and/or skin breakdown  2. Assess vascular access sites hourly  3. Every 4-6 hours minimum:  Change oxygen saturation probe site  4. Every 4-6 hours:  If on nasal continuous positive airway pressure, respiratory therapy assess nares and determine need for appliance change or resting period.   9/29/2023 1522 by Ara Lay RN  Outcome: Adequate for Discharge     Problem: ABCDS Injury Assessment  Goal: Absence of physical injury  9/29/2023 1522 by Ara Lay RN  Outcome: Adequate for Discharge     Problem: Pain  Goal: Verbalizes/displays adequate comfort level or baseline comfort level  9/29/2023 1522 by Ara Lay RN  Outcome: Adequate for Discharge     Problem: Chronic Conditions and Co-morbidities  Goal: Patient's chronic conditions and co-morbidity symptoms are monitored and maintained or improved  9/29/2023 1522 by Ara Lay RN  Outcome: Adequate for Discharge     Problem: Nutrition Deficit:  Goal: Optimize nutritional status  9/29/2023 1522 by Ara Lay RN  Outcome: Adequate for Discharge

## 2023-09-29 NOTE — PROGRESS NOTES
ADL;Positioning;Neuromuscular re-education;Cognitive reorientation;Patient/Caregiver education & training;Cognitive/Perceptual training    Goals  Patient Goals   Patient goals : To get stronger.   Short Term Goals  Time Frame for Short Term Goals: 14 days  Short Term Goal 1: Pt will complete LE dressing w/ max A x1 - met 9/27  Short Term Goal 2: Pt will complete toileting w/ max A x1 -goal not met  Short Term Goal 3: Pt complete UE dressing w/ max A x1. - goal met 9/18/23  Short Term Goal 4: Pt will complete oral care w/ mod A. - goal met 9/14  Short Term Goal 5: Pt will complete bathing w/ max A x1 -goal met 9/28 from shower chair  Long Term Goals  Time Frame for Long Term Goals : 21 days  Long Term Goal 1: Pt will complete LE dressing w/ mod A x 1-goal not met  Long Term Goal 2: Pt will complete toileting w/ mod A x1-goal not met  Long Term Goal 3: Pt will complete UE dressing w/ CGA-goal not met  Long Term Goal 4: Pt will complete oral care w/ SPV-goal not met  Long Term Goal 5: Pt will complete bathing w/ mod A x1-goal not met    AM-PAC Score               Therapy Time   Individual Concurrent Group Co-treatment   Time In       1308   Time Out       1425   Minutes       77   Timed Code Treatment Minutes: [address], OT

## 2023-09-29 NOTE — PROGRESS NOTES
Dr. Hakeem Goldman had orders to f/u with neurology on [facility] [place] by [facility], highlighted on paperwork for receiving facility. [facility] transport came to  patient, patient was transferred to [facility] without difficulties, left with transport staff.

## 2023-09-29 NOTE — PROGRESS NOTES
grade movements frequently resulting in an overshoot to R of midline. This was in short sitting. Caudal > cephalo was with Supervision as pt used the R UE on the [facility] and flexed R knee to push  Balance  Sitting Balance: Contact guard assistance (Sitting EOB w/  CGA/  + increased time for pt to orient to midline. Significant time spent w/pt receiving both VC/ TC orienting to midline with controlled , graded movements)  Transfers  Surface: From bed; To bed; Wheelchair (bed > rolling shower chair leading with L was dependent. Max/mod A x1/ and min A of another. Shower chair to bed dependent and req max A x 2)  Additional Factors: Set-up; Verbal cues; Hand placement cues; Increased time to complete  Bed To/From Chair  Technique: Sit pivot  Assistance Level: Requires x 2 assistance;Dependent  Skilled Clinical Factors: bed>wc via sqpt to L with cues for hand placement(RUE supporting the LUE)  (dependent w/  max A x1 and min A x1 req'd) also transf from w/c > BSC with max A x1 and mod A x1 at end of session when pt was fatigued  Sit Pivot  Assistance Level: Dependent; Requires x 2 assistance      Environmental Mobility  Wheelchair  Surface: Level surface  Device: Standard wheelchair;Pressure Relieving Cushion  Additional Factors: Increased time to complete;Verbal cues; Set-up; Hand placement cues  Assistance Required to Manage Parts: All wheelchair parts (L arm trough)  Assistance Level for Propulsion: Moderate assistance  Propulsion Method: Right lower extremity;Right upper extremity  Propulsion Quality: Decreased fluidity;Veers left  Propulsion Distance: 100'  Skilled Clinical Factors: Note, pt req req max VC/ TC to stay focused on task. Pt was unable to maintain a linear pathway when using both RUE and RLE , However, when providing demo with use of the RLE only, pt able to complete up to 20' at a time in a linear manner. Pt req max VC to attend to task. Pt became extremely fatigued w/ this activity

## (undated) DEVICE — PACK,BASIC: Brand: MEDLINE

## (undated) DEVICE — TRAY PREP DRY W/ PREM GLV 2 APPL 6 SPNG 2 UNDPD 1 OVERWRAP

## (undated) DEVICE — SURGICAL SET UP - SURE SET: Brand: MEDLINE INDUSTRIES, INC.

## (undated) DEVICE — ELECTRODE NDL L6.5IN S STL VERSATILE REUSE

## (undated) DEVICE — SOLUTION INJ LR VISIV 1000ML BG

## (undated) DEVICE — ERBE NESSY®PLATE 170 SPLIT; 168CM²; CABLE 3M: Brand: ERBE

## (undated) DEVICE — TURNOVER KIT RM INF CTRL TECH

## (undated) DEVICE — GAUZE,SPONGE,4"X4",16PLY,XRAY,STRL,LF: Brand: MEDLINE

## (undated) DEVICE — GOWN,SIRUS,POLYRNF,SETINSLV,L,20/CS: Brand: MEDLINE

## (undated) DEVICE — SUTURE VCRL SZ 2-0 L27IN ABSRB UD L26MM SH 1/2 CIR J417H

## (undated) DEVICE — GLOVE ORANGE PI 8   MSG9080

## (undated) DEVICE — GARMENT,MEDLINE,DVT,INT,CALF,MED, GEN2: Brand: MEDLINE

## (undated) DEVICE — ELECTRODE PT RET AD L9FT HI MOIST COND ADH HYDRGEL CORDED

## (undated) DEVICE — INTENDED FOR TISSUE SEPARATION, AND OTHER PROCEDURES THAT REQUIRE A SHARP SURGICAL BLADE TO PUNCTURE OR CUT.: Brand: BARD-PARKER ® CARBON RIB-BACK BLADES

## (undated) DEVICE — SYRINGE MED 10ML TRNSLUC BRL PLUNG BLK MRK POLYPR CTRL

## (undated) DEVICE — SUTURE VCRL SZ 3-0 L27IN ABSRB UD L17MM RB-1 1/2 CIR J215H

## (undated) DEVICE — PENCIL ES L3M ROCK SWCH S STL HEX LOK BLDE ELECTRD HOLSTER

## (undated) DEVICE — UNDERPAD INCONT 2XL FOR 38-58IN WAIST MESH PROTCT + DISP

## (undated) DEVICE — SINGLE-USE POLYPECTOMY SNARE: Brand: CAPTIFLEX

## (undated) DEVICE — ST FLUFF LG 1 PLY: Brand: DEROYAL

## (undated) DEVICE — GLOVE ORANGE PI 8 1/2   MSG9085

## (undated) DEVICE — SURE SET-DOUBLE BASIN-LF: Brand: MEDLINE INDUSTRIES, INC.

## (undated) DEVICE — PAD,ABDOMINAL,5"X9",ST,LF,25/BX: Brand: MEDLINE INDUSTRIES, INC.

## (undated) DEVICE — SYSTEM SMK EVAC LAP TBNG FILTER HSNG BENT STYL PNK SEE CLR

## (undated) DEVICE — SINGLE-USE POLYPECTOMY SNARE: Brand: CAPTIVATOR

## (undated) DEVICE — TRAP SPEC RETRV CLR PLAS POLYP IN LN SUCT QUIK CTCH

## (undated) DEVICE — STANDARD HYPODERMIC NEEDLE,POLYPROPYLENE HUB: Brand: MONOJECT

## (undated) DEVICE — SEALER LAP L37CM MARYLAND JAW OPN NANO COAT MULTIFUNCTIONAL

## (undated) DEVICE — MEDI-VAC NON-CONDUCTIVE SUCTION TUBING: Brand: CARDINAL HEALTH

## (undated) DEVICE — SPONGE,LAP,18"X18",DLX,XR,ST,5/PK,40/PK: Brand: MEDLINE

## (undated) DEVICE — FORCEPS BX L240CM JAW DIA2.4MM ORNG L CAP W/ NDL DISP RAD

## (undated) DEVICE — TRANSANAL ACCESS PLATFORM: Brand: GELPOINT® PATH TRANSANAL ACCESS PLATFORM

## (undated) DEVICE — SOLUTION ANTIFOG VIS SYS CLEARIFY LAPSCP

## (undated) DEVICE — SUTURE VCRL SZ 0 L27IN ABSRB UD L36MM CT-1 1/2 CIR J260H

## (undated) DEVICE — PAD,NON-ADHERENT,3X8,STERILE,LF,1/PK: Brand: MEDLINE

## (undated) DEVICE — FORCEPS BX L240CM DIA2.4MM L NDL RAD JAW 4 133340

## (undated) DEVICE — Device

## (undated) DEVICE — GOWN,PREVENTION PLUS,XLN/XL,ST,24/CS: Brand: MEDLINE

## (undated) DEVICE — GOWN,SIRUS,POLYRNF,SETINSLV,XL,20/CS: Brand: MEDLINE

## (undated) DEVICE — [HIGH FLOW INSUFFLATOR,  DO NOT USE IF PACKAGE IS DAMAGED,  KEEP DRY,  KEEP AWAY FROM SUNLIGHT,  PROTECT FROM HEAT AND RADIOACTIVE SOURCES.]: Brand: PNEUMOSURE

## (undated) DEVICE — COVER LT HNDL BLU PLAS